# Patient Record
Sex: MALE | Race: WHITE | NOT HISPANIC OR LATINO | Employment: OTHER | URBAN - METROPOLITAN AREA
[De-identification: names, ages, dates, MRNs, and addresses within clinical notes are randomized per-mention and may not be internally consistent; named-entity substitution may affect disease eponyms.]

---

## 2024-04-11 ENCOUNTER — HOSPITAL ENCOUNTER (INPATIENT)
Facility: HOSPITAL | Age: 69
LOS: 5 days | Discharge: HOME | DRG: 871 | End: 2024-04-16
Attending: STUDENT IN AN ORGANIZED HEALTH CARE EDUCATION/TRAINING PROGRAM | Admitting: INTERNAL MEDICINE
Payer: MEDICARE

## 2024-04-11 ENCOUNTER — APPOINTMENT (OUTPATIENT)
Dept: RADIOLOGY | Facility: HOSPITAL | Age: 69
DRG: 871 | End: 2024-04-11
Payer: MEDICARE

## 2024-04-11 ENCOUNTER — APPOINTMENT (OUTPATIENT)
Dept: CARDIOLOGY | Facility: HOSPITAL | Age: 69
DRG: 871 | End: 2024-04-11
Payer: MEDICARE

## 2024-04-11 DIAGNOSIS — J44.1 COPD WITH ACUTE EXACERBATION (MULTI): ICD-10-CM

## 2024-04-11 DIAGNOSIS — J18.9 PNEUMONIA OF BOTH LUNGS DUE TO INFECTIOUS ORGANISM, UNSPECIFIED PART OF LUNG: Primary | ICD-10-CM

## 2024-04-11 DIAGNOSIS — J96.01 ACUTE HYPOXIC RESPIRATORY FAILURE (MULTI): ICD-10-CM

## 2024-04-11 DIAGNOSIS — J18.9 PNEUMONIA OF RIGHT UPPER LOBE DUE TO INFECTIOUS ORGANISM: ICD-10-CM

## 2024-04-11 LAB
ALBUMIN SERPL-MCNC: 3.4 G/DL (ref 3.5–5)
ALP BLD-CCNC: 86 U/L (ref 35–125)
ALT SERPL-CCNC: 22 U/L (ref 5–40)
ANION GAP SERPL CALC-SCNC: >19 MMOL/L
AST SERPL-CCNC: 27 U/L (ref 5–40)
ATRIAL RATE: 106 BPM
BASOPHILS # BLD AUTO: 0.03 X10*3/UL (ref 0–0.1)
BASOPHILS NFR BLD AUTO: 0.1 %
BILIRUB SERPL-MCNC: 0.9 MG/DL (ref 0.1–1.2)
BUN SERPL-MCNC: 24 MG/DL (ref 8–25)
CALCIUM SERPL-MCNC: 9.5 MG/DL (ref 8.5–10.4)
CHLORIDE SERPL-SCNC: 87 MMOL/L (ref 97–107)
CO2 SERPL-SCNC: 23 MMOL/L (ref 24–31)
CREAT SERPL-MCNC: 1.1 MG/DL (ref 0.4–1.6)
EGFRCR SERPLBLD CKD-EPI 2021: 72 ML/MIN/1.73M*2
EOSINOPHIL # BLD AUTO: 0 X10*3/UL (ref 0–0.7)
EOSINOPHIL NFR BLD AUTO: 0 %
ERYTHROCYTE [DISTWIDTH] IN BLOOD BY AUTOMATED COUNT: 13.6 % (ref 11.5–14.5)
FLUAV RNA RESP QL NAA+PROBE: NOT DETECTED
FLUBV RNA RESP QL NAA+PROBE: NOT DETECTED
GLUCOSE SERPL-MCNC: 176 MG/DL (ref 65–99)
HCT VFR BLD AUTO: 38.5 % (ref 41–52)
HGB BLD-MCNC: 12.8 G/DL (ref 13.5–17.5)
IMM GRANULOCYTES # BLD AUTO: 0.19 X10*3/UL (ref 0–0.7)
IMM GRANULOCYTES NFR BLD AUTO: 0.8 % (ref 0–0.9)
LACTATE BLDV-SCNC: 2 MMOL/L (ref 0.4–2)
LYMPHOCYTES # BLD AUTO: 1.04 X10*3/UL (ref 1.2–4.8)
LYMPHOCYTES NFR BLD AUTO: 4.3 %
MCH RBC QN AUTO: 30.6 PG (ref 26–34)
MCHC RBC AUTO-ENTMCNC: 33.2 G/DL (ref 32–36)
MCV RBC AUTO: 92 FL (ref 80–100)
MONOCYTES # BLD AUTO: 1.52 X10*3/UL (ref 0.1–1)
MONOCYTES NFR BLD AUTO: 6.3 %
NEUTROPHILS # BLD AUTO: 21.4 X10*3/UL (ref 1.2–7.7)
NEUTROPHILS NFR BLD AUTO: 88.5 %
NRBC BLD-RTO: 0 /100 WBCS (ref 0–0)
NT-PROBNP SERPL-MCNC: 2822 PG/ML (ref 0–229)
P AXIS: 66 DEGREES
P OFFSET: 204 MS
P ONSET: 152 MS
PLATELET # BLD AUTO: 298 X10*3/UL (ref 150–450)
POTASSIUM SERPL-SCNC: 4.5 MMOL/L (ref 3.4–5.1)
PR INTERVAL: 142 MS
PROT SERPL-MCNC: 6.7 G/DL (ref 5.9–7.9)
Q ONSET: 223 MS
QRS COUNT: 17 BEATS
QRS DURATION: 78 MS
QT INTERVAL: 308 MS
QTC CALCULATION(BAZETT): 409 MS
QTC FREDERICIA: 372 MS
R AXIS: 77 DEGREES
RBC # BLD AUTO: 4.18 X10*6/UL (ref 4.5–5.9)
SARS-COV-2 RNA RESP QL NAA+PROBE: NOT DETECTED
SODIUM SERPL-SCNC: 130 MMOL/L (ref 133–145)
T AXIS: 73 DEGREES
T OFFSET: 377 MS
TROPONIN T SERPL-MCNC: 41 NG/L
TROPONIN T SERPL-MCNC: 44 NG/L
VENTRICULAR RATE: 106 BPM
WBC # BLD AUTO: 24.2 X10*3/UL (ref 4.4–11.3)

## 2024-04-11 PROCEDURE — 80053 COMPREHEN METABOLIC PANEL: CPT | Performed by: STUDENT IN AN ORGANIZED HEALTH CARE EDUCATION/TRAINING PROGRAM

## 2024-04-11 PROCEDURE — 94640 AIRWAY INHALATION TREATMENT: CPT

## 2024-04-11 PROCEDURE — 96365 THER/PROPH/DIAG IV INF INIT: CPT

## 2024-04-11 PROCEDURE — 83880 ASSAY OF NATRIURETIC PEPTIDE: CPT | Performed by: STUDENT IN AN ORGANIZED HEALTH CARE EDUCATION/TRAINING PROGRAM

## 2024-04-11 PROCEDURE — 84484 ASSAY OF TROPONIN QUANT: CPT | Performed by: STUDENT IN AN ORGANIZED HEALTH CARE EDUCATION/TRAINING PROGRAM

## 2024-04-11 PROCEDURE — 36415 COLL VENOUS BLD VENIPUNCTURE: CPT

## 2024-04-11 PROCEDURE — 2500000002 HC RX 250 W HCPCS SELF ADMINISTERED DRUGS (ALT 637 FOR MEDICARE OP, ALT 636 FOR OP/ED): Performed by: INTERNAL MEDICINE

## 2024-04-11 PROCEDURE — 36415 COLL VENOUS BLD VENIPUNCTURE: CPT | Performed by: STUDENT IN AN ORGANIZED HEALTH CARE EDUCATION/TRAINING PROGRAM

## 2024-04-11 PROCEDURE — 96375 TX/PRO/DX INJ NEW DRUG ADDON: CPT

## 2024-04-11 PROCEDURE — 93005 ELECTROCARDIOGRAM TRACING: CPT

## 2024-04-11 PROCEDURE — 2500000004 HC RX 250 GENERAL PHARMACY W/ HCPCS (ALT 636 FOR OP/ED): Performed by: INTERNAL MEDICINE

## 2024-04-11 PROCEDURE — 2500000004 HC RX 250 GENERAL PHARMACY W/ HCPCS (ALT 636 FOR OP/ED)

## 2024-04-11 PROCEDURE — 96361 HYDRATE IV INFUSION ADD-ON: CPT

## 2024-04-11 PROCEDURE — 85025 COMPLETE CBC W/AUTO DIFF WBC: CPT | Performed by: STUDENT IN AN ORGANIZED HEALTH CARE EDUCATION/TRAINING PROGRAM

## 2024-04-11 PROCEDURE — 2500000001 HC RX 250 WO HCPCS SELF ADMINISTERED DRUGS (ALT 637 FOR MEDICARE OP): Performed by: INTERNAL MEDICINE

## 2024-04-11 PROCEDURE — 83605 ASSAY OF LACTIC ACID: CPT

## 2024-04-11 PROCEDURE — 87075 CULTR BACTERIA EXCEPT BLOOD: CPT | Mod: WESLAB

## 2024-04-11 PROCEDURE — 99291 CRITICAL CARE FIRST HOUR: CPT | Mod: 25

## 2024-04-11 PROCEDURE — 2500000002 HC RX 250 W HCPCS SELF ADMINISTERED DRUGS (ALT 637 FOR MEDICARE OP, ALT 636 FOR OP/ED)

## 2024-04-11 PROCEDURE — 87040 BLOOD CULTURE FOR BACTERIA: CPT | Mod: WESLAB

## 2024-04-11 PROCEDURE — 2060000001 HC INTERMEDIATE ICU ROOM DAILY

## 2024-04-11 PROCEDURE — 87636 SARSCOV2 & INF A&B AMP PRB: CPT

## 2024-04-11 PROCEDURE — 71045 X-RAY EXAM CHEST 1 VIEW: CPT

## 2024-04-11 PROCEDURE — 96367 TX/PROPH/DG ADDL SEQ IV INF: CPT

## 2024-04-11 PROCEDURE — 71045 X-RAY EXAM CHEST 1 VIEW: CPT | Performed by: RADIOLOGY

## 2024-04-11 RX ORDER — METFORMIN HYDROCHLORIDE 1000 MG/1
1000 TABLET ORAL
COMMUNITY

## 2024-04-11 RX ORDER — LISINOPRIL 5 MG/1
5 TABLET ORAL DAILY
Status: DISCONTINUED | OUTPATIENT
Start: 2024-04-11 | End: 2024-04-16

## 2024-04-11 RX ORDER — ACETAMINOPHEN 160 MG/5ML
650 SOLUTION ORAL EVERY 4 HOURS PRN
Status: DISCONTINUED | OUTPATIENT
Start: 2024-04-11 | End: 2024-04-16 | Stop reason: HOSPADM

## 2024-04-11 RX ORDER — ASCORBIC ACID 500 MG
500 TABLET ORAL DAILY
COMMUNITY

## 2024-04-11 RX ORDER — ATORVASTATIN CALCIUM 40 MG/1
40 TABLET, FILM COATED ORAL NIGHTLY
Status: DISCONTINUED | OUTPATIENT
Start: 2024-04-11 | End: 2024-04-16 | Stop reason: HOSPADM

## 2024-04-11 RX ORDER — GLIPIZIDE 5 MG/1
5 TABLET ORAL
COMMUNITY

## 2024-04-11 RX ORDER — TADALAFIL 20 MG/1
20 TABLET ORAL DAILY PRN
COMMUNITY

## 2024-04-11 RX ORDER — CEFTRIAXONE 2 G/50ML
2 INJECTION, SOLUTION INTRAVENOUS ONCE
Status: COMPLETED | OUTPATIENT
Start: 2024-04-11 | End: 2024-04-11

## 2024-04-11 RX ORDER — GLIPIZIDE 5 MG/1
5 TABLET ORAL
Status: DISCONTINUED | OUTPATIENT
Start: 2024-04-12 | End: 2024-04-16 | Stop reason: HOSPADM

## 2024-04-11 RX ORDER — FLUTICASONE PROPIONATE 50 MCG
1 SPRAY, SUSPENSION (ML) NASAL DAILY
COMMUNITY

## 2024-04-11 RX ORDER — ATORVASTATIN CALCIUM 40 MG/1
40 TABLET, FILM COATED ORAL DAILY
COMMUNITY

## 2024-04-11 RX ORDER — ACETAMINOPHEN 650 MG/1
650 SUPPOSITORY RECTAL EVERY 4 HOURS PRN
Status: DISCONTINUED | OUTPATIENT
Start: 2024-04-11 | End: 2024-04-16 | Stop reason: HOSPADM

## 2024-04-11 RX ORDER — VITAMIN E MIXED 400 UNIT
400 CAPSULE ORAL DAILY
COMMUNITY

## 2024-04-11 RX ORDER — IPRATROPIUM BROMIDE AND ALBUTEROL SULFATE 2.5; .5 MG/3ML; MG/3ML
3 SOLUTION RESPIRATORY (INHALATION)
Status: DISCONTINUED | OUTPATIENT
Start: 2024-04-12 | End: 2024-04-12

## 2024-04-11 RX ORDER — PREGABALIN 300 MG/1
300 CAPSULE ORAL 2 TIMES DAILY
COMMUNITY

## 2024-04-11 RX ORDER — PANTOPRAZOLE SODIUM 20 MG/1
20 TABLET, DELAYED RELEASE ORAL DAILY
Status: DISCONTINUED | OUTPATIENT
Start: 2024-04-11 | End: 2024-04-16 | Stop reason: HOSPADM

## 2024-04-11 RX ORDER — ENOXAPARIN SODIUM 100 MG/ML
40 INJECTION SUBCUTANEOUS EVERY 24 HOURS
Status: DISCONTINUED | OUTPATIENT
Start: 2024-04-12 | End: 2024-04-16 | Stop reason: HOSPADM

## 2024-04-11 RX ORDER — CEFTRIAXONE 2 G/50ML
2 INJECTION, SOLUTION INTRAVENOUS EVERY 24 HOURS
Status: DISCONTINUED | OUTPATIENT
Start: 2024-04-12 | End: 2024-04-16 | Stop reason: HOSPADM

## 2024-04-11 RX ORDER — PANTOPRAZOLE SODIUM 20 MG/1
20 TABLET, DELAYED RELEASE ORAL
COMMUNITY

## 2024-04-11 RX ORDER — POLYETHYLENE GLYCOL 3350 17 G/17G
17 POWDER, FOR SOLUTION ORAL DAILY
Status: DISCONTINUED | OUTPATIENT
Start: 2024-04-11 | End: 2024-04-16 | Stop reason: HOSPADM

## 2024-04-11 RX ORDER — LISINOPRIL 5 MG/1
5 TABLET ORAL DAILY
COMMUNITY

## 2024-04-11 RX ORDER — PREGABALIN 150 MG/1
300 CAPSULE ORAL 2 TIMES DAILY
Status: DISCONTINUED | OUTPATIENT
Start: 2024-04-11 | End: 2024-04-16 | Stop reason: HOSPADM

## 2024-04-11 RX ORDER — MAGNESIUM L-LACTATE 84 MG
84 TABLET, EXTENDED RELEASE ORAL 2 TIMES DAILY
COMMUNITY

## 2024-04-11 RX ORDER — METFORMIN HYDROCHLORIDE 1000 MG/1
1000 TABLET ORAL
Status: DISCONTINUED | OUTPATIENT
Start: 2024-04-12 | End: 2024-04-16 | Stop reason: HOSPADM

## 2024-04-11 RX ORDER — IPRATROPIUM BROMIDE AND ALBUTEROL SULFATE 2.5; .5 MG/3ML; MG/3ML
3 SOLUTION RESPIRATORY (INHALATION) ONCE
Status: COMPLETED | OUTPATIENT
Start: 2024-04-11 | End: 2024-04-11

## 2024-04-11 RX ORDER — ACETAMINOPHEN 325 MG/1
650 TABLET ORAL EVERY 4 HOURS PRN
Status: DISCONTINUED | OUTPATIENT
Start: 2024-04-11 | End: 2024-04-16 | Stop reason: HOSPADM

## 2024-04-11 RX ADMIN — PREGABALIN 300 MG: 150 CAPSULE ORAL at 23:32

## 2024-04-11 RX ADMIN — DEXTROSE MONOHYDRATE 500 MG: 50 INJECTION, SOLUTION INTRAVENOUS at 13:57

## 2024-04-11 RX ADMIN — METHYLPREDNISOLONE SODIUM SUCCINATE 125 MG: 125 INJECTION, POWDER, FOR SOLUTION INTRAMUSCULAR; INTRAVENOUS at 13:43

## 2024-04-11 RX ADMIN — METHYLPREDNISOLONE SODIUM SUCCINATE 80 MG: 40 INJECTION, POWDER, FOR SOLUTION INTRAMUSCULAR; INTRAVENOUS at 23:32

## 2024-04-11 RX ADMIN — PANTOPRAZOLE SODIUM 20 MG: 20 TABLET, DELAYED RELEASE ORAL at 23:32

## 2024-04-11 RX ADMIN — SODIUM CHLORIDE 1000 ML: 900 INJECTION, SOLUTION INTRAVENOUS at 13:43

## 2024-04-11 RX ADMIN — ATORVASTATIN CALCIUM 40 MG: 40 TABLET, FILM COATED ORAL at 23:38

## 2024-04-11 RX ADMIN — CEFTRIAXONE SODIUM 2 G: 2 INJECTION, SOLUTION INTRAVENOUS at 13:43

## 2024-04-11 RX ADMIN — IPRATROPIUM BROMIDE AND ALBUTEROL SULFATE 3 ML: .5; 3 SOLUTION RESPIRATORY (INHALATION) at 13:43

## 2024-04-11 RX ADMIN — LISINOPRIL 5 MG: 5 TABLET ORAL at 23:32

## 2024-04-11 SDOH — SOCIAL STABILITY: SOCIAL INSECURITY: WERE YOU ABLE TO COMPLETE ALL THE BEHAVIORAL HEALTH SCREENINGS?: NO

## 2024-04-11 SDOH — SOCIAL STABILITY: SOCIAL INSECURITY: HAVE YOU HAD THOUGHTS OF HARMING ANYONE ELSE?: NO

## 2024-04-11 ASSESSMENT — COGNITIVE AND FUNCTIONAL STATUS - GENERAL
MOVING FROM LYING ON BACK TO SITTING ON SIDE OF FLAT BED WITH BEDRAILS: A LITTLE
MOBILITY SCORE: 18
DRESSING REGULAR UPPER BODY CLOTHING: A LITTLE
MOVING TO AND FROM BED TO CHAIR: A LITTLE
EATING MEALS: A LITTLE
STANDING UP FROM CHAIR USING ARMS: A LITTLE
CLIMB 3 TO 5 STEPS WITH RAILING: A LITTLE
DRESSING REGULAR LOWER BODY CLOTHING: A LITTLE
DAILY ACTIVITIY SCORE: 18
PERSONAL GROOMING: A LITTLE
TURNING FROM BACK TO SIDE WHILE IN FLAT BAD: A LITTLE
HELP NEEDED FOR BATHING: A LITTLE
PATIENT BASELINE BEDBOUND: NO
WALKING IN HOSPITAL ROOM: A LITTLE
TOILETING: A LITTLE

## 2024-04-11 ASSESSMENT — ACTIVITIES OF DAILY LIVING (ADL)
TOILETING: INDEPENDENT
BATHING: INDEPENDENT
JUDGMENT_ADEQUATE_SAFELY_COMPLETE_DAILY_ACTIVITIES: YES
FEEDING YOURSELF: INDEPENDENT
GROOMING: INDEPENDENT
ASSISTIVE_DEVICE: EYEGLASSES
PATIENT'S MEMORY ADEQUATE TO SAFELY COMPLETE DAILY ACTIVITIES?: YES
HEARING - LEFT EAR: FUNCTIONAL
WALKS IN HOME: INDEPENDENT
DRESSING YOURSELF: INDEPENDENT
ADEQUATE_TO_COMPLETE_ADL: YES
HEARING - RIGHT EAR: FUNCTIONAL
LACK_OF_TRANSPORTATION: NO

## 2024-04-11 ASSESSMENT — LIFESTYLE VARIABLES
AUDIT-C TOTAL SCORE: 1
HAVE PEOPLE ANNOYED YOU BY CRITICIZING YOUR DRINKING: NO
SKIP TO QUESTIONS 9-10: 1
AUDIT-C TOTAL SCORE: 1
HOW MANY STANDARD DRINKS CONTAINING ALCOHOL DO YOU HAVE ON A TYPICAL DAY: 1 OR 2
EVER HAD A DRINK FIRST THING IN THE MORNING TO STEADY YOUR NERVES TO GET RID OF A HANGOVER: NO
SUBSTANCE_ABUSE_PAST_12_MONTHS: YES
HAVE YOU EVER FELT YOU SHOULD CUT DOWN ON YOUR DRINKING: NO
TOTAL SCORE: 0
HOW OFTEN DO YOU HAVE A DRINK CONTAINING ALCOHOL: MONTHLY OR LESS
PRESCIPTION_ABUSE_PAST_12_MONTHS: NO
HOW OFTEN DO YOU HAVE 6 OR MORE DRINKS ON ONE OCCASION: NEVER
EVER FELT BAD OR GUILTY ABOUT YOUR DRINKING: NO

## 2024-04-11 ASSESSMENT — PAIN SCALES - GENERAL: PAINLEVEL_OUTOF10: 4

## 2024-04-11 ASSESSMENT — COLUMBIA-SUICIDE SEVERITY RATING SCALE - C-SSRS
6. HAVE YOU EVER DONE ANYTHING, STARTED TO DO ANYTHING, OR PREPARED TO DO ANYTHING TO END YOUR LIFE?: NO
1. IN THE PAST MONTH, HAVE YOU WISHED YOU WERE DEAD OR WISHED YOU COULD GO TO SLEEP AND NOT WAKE UP?: NO
2. HAVE YOU ACTUALLY HAD ANY THOUGHTS OF KILLING YOURSELF?: NO

## 2024-04-11 ASSESSMENT — PATIENT HEALTH QUESTIONNAIRE - PHQ9
1. LITTLE INTEREST OR PLEASURE IN DOING THINGS: NOT AT ALL
2. FEELING DOWN, DEPRESSED OR HOPELESS: NOT AT ALL
SUM OF ALL RESPONSES TO PHQ9 QUESTIONS 1 & 2: 0

## 2024-04-11 ASSESSMENT — PAIN DESCRIPTION - PROGRESSION: CLINICAL_PROGRESSION: NOT CHANGED

## 2024-04-11 ASSESSMENT — PAIN - FUNCTIONAL ASSESSMENT: PAIN_FUNCTIONAL_ASSESSMENT: 0-10

## 2024-04-11 NOTE — ED PROVIDER NOTES
Supervisory note:  Patient seen in conjunction with SOHA Mcneil.  Patient presents to the emergency department from primary care physician office.  He went to his PCP office due to pain in the low back.  He reports he has long had pain in his back and denies any new injuries.  He believes that it is sciatica.  While at his primary care physician's office, they noted that his oxygen saturation was low on room air.  On arrival to the emergency department, he is tachycardic and hypotensive.  On examination, there are rhonchorous lung sounds/wheezes heard with expiration.  Heart is tachycardic but regular.  There is no pretibial edema.    EKG revealed mild ST elevations in inferior leads without reciprocal changes.  I discussed patient's case with interventional cardiology, Dr. Slaed, who does not feel that patient requires immediate cardiology intervention at this time.  Laboratory studies notable for leukocytosis, 24,000.  Chest x-ray consistent with pneumonia.  Given patient's abnormal vital signs as well as leukocytosis, he was treated for sepsis with 30 mill per KG fluid bolus and antibiotics tailored for pneumonia.  Blood pressure improved.  Patient then accepted to medicine service for further management.    I personally saw the patient and made/approved the management plan and take responsiblity for the patient management.  Parts of this chart were completed with dictation software, please excuse any errors in transcription.     Javi Romero MD  04/12/24 9986

## 2024-04-11 NOTE — ED PROVIDER NOTES
HPI   Chief Complaint   Patient presents with    Shortness of Breath       HPI  Patient is a 68-year-old male presenting to ER from his primary care office for evaluation of shortness of breath and low oxygen saturation on room air.  Patient is a smoker.  He states that he has been feeling very unwell and more short of breath for the past week and has hardly been able to eat and drink and has a bad cough.  He is denying chest pain at this time.  Per the patient's son, he has been admitted for pneumonia previously in the past and had similar symptoms.  Patient also has back pain that is chronic in nature and in the low back.                  No data recorded                   Patient History   No past medical history on file.  No past surgical history on file.  No family history on file.  Social History     Tobacco Use    Smoking status: Not on file    Smokeless tobacco: Not on file   Substance Use Topics    Alcohol use: Not on file    Drug use: Not on file       Physical Exam   ED Triage Vitals [04/11/24 1311]   Temperature Heart Rate Respirations BP   36.8 °C (98.2 °F) (!) 110 20 96/54      Pulse Ox Temp src Heart Rate Source Patient Position   (!) 93 % -- -- --      BP Location FiO2 (%)     -- --       Physical Exam  Vitals and nursing note reviewed.   Constitutional:       Comments: Patient is awake and alert upon arrival but is tachycardic and slightly hypoxic   HENT:      Head: Normocephalic and atraumatic.   Eyes:      Conjunctiva/sclera: Conjunctivae normal.   Cardiovascular:      Rate and Rhythm: Regular rhythm. Tachycardia present.      Heart sounds: No murmur heard.  Pulmonary:      Comments: Tachypneic but no accessory muscle use, bilateral lungs with rhonchi and wheezes  Abdominal:      Palpations: Abdomen is soft.      Tenderness: There is no abdominal tenderness.   Musculoskeletal:         General: No swelling.      Cervical back: Neck supple.      Right lower leg: No edema.      Left lower leg: No  edema.   Skin:     General: Skin is warm and dry.      Capillary Refill: Capillary refill takes less than 2 seconds.   Neurological:      Mental Status: He is alert and oriented to person, place, and time.   Psychiatric:         Mood and Affect: Mood normal.         ED Course & MDM   ED Course as of 04/11/24 1750   Thu Apr 11, 2024   1337 EKG interpretation: Sinus tachycardia, rate 106.  Normal axis.  Approximately 1 mm of ST elevation noted in lead II, less than 1 mm of ST elevation noted in lead III and aVF.  No reciprocal changes.  ST elevations are concave upward. [ML]   1426 Patient given 30 mL/kg septic fluid bolus for fluid resuscitation [JJ]      ED Course User Index  [JJ] Meg Valderrama PA-C  [ML] Javi Romero MD         Diagnoses as of 04/11/24 1750   Acute hypoxic respiratory failure (CMS/HCC)   Pneumonia of both lungs due to infectious organism, unspecified part of lung       Medical Decision Making  Parts of this chart have been completed using voice recognition software. Please excuse any errors of transcription.  My thought process and reason for plan has been formulated from the time that I saw the patient until the time of disposition and is not specific to one specific moment during their visit and furthermore my MDM encompasses this entire chart and not only this text box.      HPI: Detailed above.    Exam: A medically appropriate exam performed, outlined above, given the known history and presentation.    History obtained from: Patient    EKG: As interpreted by attending physician above in ED course    Social Determinants of Health considered during this visit: Lives independently    Medications given during visit:  Medications   sodium chloride 0.9 % bolus 1,500 mL (1,500 mL intravenous Not Given 4/11/24 1420)   ipratropium-albuteroL (Duo-Neb) 0.5-2.5 mg/3 mL nebulizer solution 3 mL (3 mL nebulization Given 4/11/24 1343)   methylPREDNISolone sod succinate (SOLU-Medrol) injection 125 mg (125 mg  intravenous Given 4/11/24 1343)   sodium chloride 0.9 % bolus 1,000 mL (0 mL intravenous Stopped 4/11/24 1543)   cefTRIAXone (Rocephin) 2 g in dextrose 5% in water (D5W) 50 mL (0 g intravenous Stopped 4/11/24 1413)   azithromycin (Zithromax) in dextrose 5 % in water (D5W) 250 mL  mg (0 mg intravenous Stopped 4/11/24 1457)        Diagnostic/tests  Labs Reviewed   CBC WITH AUTO DIFFERENTIAL - Abnormal       Result Value    WBC 24.2 (*)     nRBC 0.0      RBC 4.18 (*)     Hemoglobin 12.8 (*)     Hematocrit 38.5 (*)     MCV 92      MCH 30.6      MCHC 33.2      RDW 13.6      Platelets 298      Neutrophils % 88.5      Immature Granulocytes %, Automated 0.8      Lymphocytes % 4.3      Monocytes % 6.3      Eosinophils % 0.0      Basophils % 0.1      Neutrophils Absolute 21.40 (*)     Immature Granulocytes Absolute, Automated 0.19      Lymphocytes Absolute 1.04 (*)     Monocytes Absolute 1.52 (*)     Eosinophils Absolute 0.00      Basophils Absolute 0.03     COMPREHENSIVE METABOLIC PANEL - Abnormal    Glucose 176 (*)     Sodium 130 (*)     Potassium 4.5      Chloride 87 (*)     Bicarbonate 23 (*)     Urea Nitrogen 24      Creatinine 1.10      eGFR 72      Calcium 9.5      Albumin 3.4 (*)     Alkaline Phosphatase 86      Total Protein 6.7      AST 27      Bilirubin, Total 0.9      ALT 22      Anion Gap >19 (*)    SERIAL TROPONIN, INITIAL ([facility]) - Abnormal    Troponin T, High Sensitivity 44 (*)    SERIAL TROPONIN,  2 HOUR ([facility]) - Abnormal    Troponin T, High Sensitivity 41 (*)    N-TERMINAL PROBNP - Abnormal    PROBNP 2,822 (*)     Narrative:     Reference ranges are based on clinical submission data. These ranges represent the 95th percentile of normal cut-off points. As NT Pro- BNP values approach 1000 pg/ml, clinical symptoms are more likely associated with CHF.   BLOOD GAS LACTIC ACID, VENOUS - Normal    POCT Lactate, Venous 2.0     SARS-COV-2 AND INFLUENZA A/B PCR - Normal    Flu A Result Not Detected      Flu B  Result Not Detected      Coronavirus 2019, PCR Not Detected      Narrative:     This assay has received FDA Emergency Use Authorization (EUA) and  is only authorized for the duration of time that circumstances exist to justify the authorization of the emergency use of in vitro diagnostic tests for the detection of SARS-CoV-2 virus and/or diagnosis of COVID-19 infection under section 564(b)(1) of the Act, 21 U.S.C. 360bbb-3(b)(1). Testing for SARS-CoV-2 is only recommended for patients who meet current clinical and/or epidemiological criteria as defined by federal, state, or local public health directives. This assay is an in vitro diagnostic nucleic acid amplification test for the qualitative detection of SARS-CoV-2, Influenza A, and Influenza B from nasopharyngeal specimens and has been validated for use at [facility]. Negative results do not preclude COVID-19 infections or Influenza A/B infections, and should not be used as the sole basis for diagnosis, treatment, or other management decisions. If Influenza A/B and RSV PCR results are negative, testing for Parainfluenza virus, Adenovirus and Metapneumovirus is routinely performed for [facility] pediatric oncology and intensive care inpatients, and is available on other patients by placing an add-on request.    BLOOD CULTURE   BLOOD CULTURE   TROPONIN T SERIES, HIGH SENSITIVITY (0, 2 HR, 6 HR)    Narrative:     The following orders were created for panel order Troponin T Series, High Sensitivity (0, 2HR, 6HR).  Procedure                               Abnormality         Status                     ---------                               -----------         ------                     Serial Troponin, Initial...[[id number]]  Abnormal            Final result               Serial Troponin, 2 Hour ...[[id number]]  Abnormal            Final result               Serial Troponin, 6 Hour ...[[id number]]                                                   Please  view results for these tests on the individual orders.   URINALYSIS WITH REFLEX CULTURE AND MICROSCOPIC    Narrative:     The following orders were created for panel order Urinalysis with Reflex Culture and Microscopic.  Procedure                               Abnormality         Status                     ---------                               -----------         ------                     Urinalysis with Reflex C...[[id number]]                                                 Extra Urine Gray Tube[[id number]]                                                         Please view results for these tests on the individual orders.   URINALYSIS WITH REFLEX CULTURE AND MICROSCOPIC   EXTRA URINE GRAY TUBE   SERIAL TROPONIN, 6 HOUR ([facility])      XR chest 1 view   Final Result   1.  Patchy bilateral infiltrates. Consider multifocal pneumonia or   edema. Follow-up is recommended.                  MACRO:   None        Signed by: Jus Martinez 4/11/2024 2:34 PM   Dictation workstation:   [id number]           Considerations/further MDM:  68-year-old male referred to ER by primary care office for low oxygen saturation.  On arrival patient was tachycardic and hypotensive.  Patient was also hypoxic at 85 to 87% on room air thus he was placed on 2 L with improvement.  Lungs were wheezy and rhonchorous bilaterally.  Sepsis order set was initiated and it IV ceftriaxone and azithromycin were administered for suspicion of pneumonia.  Only 1 L fluid bolus was initially administered for concerns of fluid overload.  EKG did reveal mild ST segment elevations in inferior leads for which attending physicians discussed this with cardiology with no recommendations and low concern for ACS at this time.  Patient was having no chest pain.  Basic laboratory workup with leukocytosis and elevated BNP.  Troponins were elevated but flat upon repeat.  Chest x-ray concerning for multifocal pneumonia.  Patient's vital signs did significantly improve with fluid  administration, DuoNeb and Solu-Medrol while in the ER.  Discussed with patient the results of the laboratory and imaging studies as well as need for inpatient management at this time and he was agreeable.  I spoke with Dr. Haider.  He is agreeable to accepting the patient for further management of his multifocal pneumonia and acute hypoxic respiratory failure.  I saw this patient in conjunction with Dr. Romero.  He is refer to his note for additional information regarding patient case.      Procedure  Critical Care    Performed by: Meg Valderrama PA-C  Authorized by: Javi Romero MD    Critical care provider statement:     Critical care time (minutes):  35    Critical care was necessary to treat or prevent imminent or life-threatening deterioration of the following conditions:  Sepsis and respiratory failure    Critical care was time spent personally by me on the following activities:  Examination of patient, discussions with primary provider, development of treatment plan with patient or surrogate, ordering and performing treatments and interventions, ordering and review of laboratory studies, ordering and review of radiographic studies, pulse oximetry and obtaining history from patient or surrogate    Care discussed with: admitting provider         Meg Valderrama PA-C  04/11/24 0444

## 2024-04-11 NOTE — ED TRIAGE NOTES
Patient presents to the emergency department from his doctors office with shortness of breath. Patient states he went to the doctors today because he was having back pain. Patient has chronic back pain. Patient states they found him to be 88% on RA, patient denies oxygen use at home. Patient does have a hx of COPD. Patient states he quit smoking 2 weeks ago.

## 2024-04-12 PROBLEM — J18.9 PNEUMONIA DUE TO INFECTIOUS ORGANISM: Status: ACTIVE | Noted: 2024-04-12

## 2024-04-12 PROBLEM — F17.210 DEPENDENCE ON NICOTINE FROM CIGARETTES: Status: ACTIVE | Noted: 2024-04-12

## 2024-04-12 PROBLEM — J44.1 COPD WITH ACUTE EXACERBATION (MULTI): Status: ACTIVE | Noted: 2024-04-12

## 2024-04-12 LAB
ANION GAP SERPL CALC-SCNC: 13 MMOL/L
APPEARANCE UR: CLEAR
BILIRUB UR STRIP.AUTO-MCNC: NEGATIVE MG/DL
BUN SERPL-MCNC: 33 MG/DL (ref 8–25)
CALCIUM SERPL-MCNC: 9.5 MG/DL (ref 8.5–10.4)
CHLORIDE SERPL-SCNC: 92 MMOL/L (ref 97–107)
CO2 SERPL-SCNC: 25 MMOL/L (ref 24–31)
COLOR UR: COLORLESS
CREAT SERPL-MCNC: 0.8 MG/DL (ref 0.4–1.6)
EGFRCR SERPLBLD CKD-EPI 2021: >90 ML/MIN/1.73M*2
ERYTHROCYTE [DISTWIDTH] IN BLOOD BY AUTOMATED COUNT: 13.1 % (ref 11.5–14.5)
GLUCOSE BLD MANUAL STRIP-MCNC: 344 MG/DL (ref 74–99)
GLUCOSE BLD MANUAL STRIP-MCNC: 399 MG/DL (ref 74–99)
GLUCOSE BLD MANUAL STRIP-MCNC: 411 MG/DL (ref 74–99)
GLUCOSE BLD MANUAL STRIP-MCNC: 414 MG/DL (ref 74–99)
GLUCOSE BLD MANUAL STRIP-MCNC: 420 MG/DL (ref 74–99)
GLUCOSE BLD MANUAL STRIP-MCNC: 526 MG/DL (ref 74–99)
GLUCOSE SERPL-MCNC: 453 MG/DL (ref 65–99)
GLUCOSE UR STRIP.AUTO-MCNC: ABNORMAL MG/DL
HCT VFR BLD AUTO: 39.1 % (ref 41–52)
HGB BLD-MCNC: 13.1 G/DL (ref 13.5–17.5)
HOLD SPECIMEN: NORMAL
KETONES UR STRIP.AUTO-MCNC: ABNORMAL MG/DL
LEUKOCYTE ESTERASE UR QL STRIP.AUTO: NEGATIVE
MCH RBC QN AUTO: 30.4 PG (ref 26–34)
MCHC RBC AUTO-ENTMCNC: 33.5 G/DL (ref 32–36)
MCV RBC AUTO: 91 FL (ref 80–100)
NITRITE UR QL STRIP.AUTO: NEGATIVE
NRBC BLD-RTO: 0 /100 WBCS (ref 0–0)
PH UR STRIP.AUTO: 5 [PH]
PLATELET # BLD AUTO: 290 X10*3/UL (ref 150–450)
POTASSIUM SERPL-SCNC: 4.6 MMOL/L (ref 3.4–5.1)
PROT UR STRIP.AUTO-MCNC: NEGATIVE MG/DL
RBC # BLD AUTO: 4.31 X10*6/UL (ref 4.5–5.9)
RBC # UR STRIP.AUTO: ABNORMAL /UL
RBC #/AREA URNS AUTO: ABNORMAL /HPF
SODIUM SERPL-SCNC: 130 MMOL/L (ref 133–145)
SP GR UR STRIP.AUTO: 1.02
TROPONIN T SERPL-MCNC: 20 NG/L
UROBILINOGEN UR STRIP.AUTO-MCNC: NORMAL MG/DL
WBC # BLD AUTO: 12.6 X10*3/UL (ref 4.4–11.3)
WBC #/AREA URNS AUTO: ABNORMAL /HPF

## 2024-04-12 PROCEDURE — 94664 DEMO&/EVAL PT USE INHALER: CPT

## 2024-04-12 PROCEDURE — 85027 COMPLETE CBC AUTOMATED: CPT | Performed by: INTERNAL MEDICINE

## 2024-04-12 PROCEDURE — 2500000001 HC RX 250 WO HCPCS SELF ADMINISTERED DRUGS (ALT 637 FOR MEDICARE OP): Performed by: INTERNAL MEDICINE

## 2024-04-12 PROCEDURE — 2060000001 HC INTERMEDIATE ICU ROOM DAILY

## 2024-04-12 PROCEDURE — 82947 ASSAY GLUCOSE BLOOD QUANT: CPT

## 2024-04-12 PROCEDURE — 2500000004 HC RX 250 GENERAL PHARMACY W/ HCPCS (ALT 636 FOR OP/ED): Performed by: INTERNAL MEDICINE

## 2024-04-12 PROCEDURE — 36415 COLL VENOUS BLD VENIPUNCTURE: CPT | Performed by: INTERNAL MEDICINE

## 2024-04-12 PROCEDURE — 94640 AIRWAY INHALATION TREATMENT: CPT

## 2024-04-12 PROCEDURE — 81001 URINALYSIS AUTO W/SCOPE: CPT

## 2024-04-12 PROCEDURE — 9420000001 HC RT PATIENT EDUCATION 5 MIN

## 2024-04-12 PROCEDURE — 2500000002 HC RX 250 W HCPCS SELF ADMINISTERED DRUGS (ALT 637 FOR MEDICARE OP, ALT 636 FOR OP/ED): Mod: MUE | Performed by: INTERNAL MEDICINE

## 2024-04-12 PROCEDURE — 82374 ASSAY BLOOD CARBON DIOXIDE: CPT | Performed by: INTERNAL MEDICINE

## 2024-04-12 RX ORDER — IPRATROPIUM BROMIDE AND ALBUTEROL SULFATE 2.5; .5 MG/3ML; MG/3ML
3 SOLUTION RESPIRATORY (INHALATION)
Status: DISCONTINUED | OUTPATIENT
Start: 2024-04-12 | End: 2024-04-14

## 2024-04-12 RX ORDER — ALBUTEROL SULFATE 0.83 MG/ML
2.5 SOLUTION RESPIRATORY (INHALATION) EVERY 2 HOUR PRN
Status: DISCONTINUED | OUTPATIENT
Start: 2024-04-12 | End: 2024-04-16 | Stop reason: HOSPADM

## 2024-04-12 RX ORDER — INSULIN GLARGINE 100 [IU]/ML
20 INJECTION, SOLUTION SUBCUTANEOUS EVERY 24 HOURS
Status: DISCONTINUED | OUTPATIENT
Start: 2024-04-12 | End: 2024-04-14

## 2024-04-12 RX ORDER — DEXTROSE 50 % IN WATER (D50W) INTRAVENOUS SYRINGE
25
Status: DISCONTINUED | OUTPATIENT
Start: 2024-04-12 | End: 2024-04-16 | Stop reason: HOSPADM

## 2024-04-12 RX ORDER — INSULIN LISPRO 100 [IU]/ML
0-5 INJECTION, SOLUTION INTRAVENOUS; SUBCUTANEOUS
Status: DISCONTINUED | OUTPATIENT
Start: 2024-04-12 | End: 2024-04-16 | Stop reason: HOSPADM

## 2024-04-12 RX ORDER — INSULIN LISPRO 100 [IU]/ML
12 INJECTION, SOLUTION INTRAVENOUS; SUBCUTANEOUS ONCE
Status: COMPLETED | OUTPATIENT
Start: 2024-04-12 | End: 2024-04-12

## 2024-04-12 RX ORDER — DEXTROSE 50 % IN WATER (D50W) INTRAVENOUS SYRINGE
12.5
Status: DISCONTINUED | OUTPATIENT
Start: 2024-04-12 | End: 2024-04-16 | Stop reason: HOSPADM

## 2024-04-12 RX ADMIN — GLIPIZIDE 5 MG: 5 TABLET ORAL at 16:35

## 2024-04-12 RX ADMIN — ATORVASTATIN CALCIUM 40 MG: 40 TABLET, FILM COATED ORAL at 21:13

## 2024-04-12 RX ADMIN — DEXTROSE MONOHYDRATE 500 MG: 50 INJECTION, SOLUTION INTRAVENOUS at 14:22

## 2024-04-12 RX ADMIN — IPRATROPIUM BROMIDE AND ALBUTEROL SULFATE 3 ML: 2.5; .5 SOLUTION RESPIRATORY (INHALATION) at 07:14

## 2024-04-12 RX ADMIN — INSULIN GLARGINE 20 UNITS: 100 INJECTION, SOLUTION SUBCUTANEOUS at 21:14

## 2024-04-12 RX ADMIN — CEFTRIAXONE SODIUM 2 G: 2 INJECTION, SOLUTION INTRAVENOUS at 13:36

## 2024-04-12 RX ADMIN — IPRATROPIUM BROMIDE AND ALBUTEROL SULFATE 3 ML: 2.5; .5 SOLUTION RESPIRATORY (INHALATION) at 11:35

## 2024-04-12 RX ADMIN — GLIPIZIDE 5 MG: 5 TABLET ORAL at 07:00

## 2024-04-12 RX ADMIN — IPRATROPIUM BROMIDE AND ALBUTEROL SULFATE 3 ML: 2.5; .5 SOLUTION RESPIRATORY (INHALATION) at 00:15

## 2024-04-12 RX ADMIN — METHYLPREDNISOLONE SODIUM SUCCINATE 80 MG: 40 INJECTION, POWDER, FOR SOLUTION INTRAMUSCULAR; INTRAVENOUS at 05:30

## 2024-04-12 RX ADMIN — PREGABALIN 300 MG: 150 CAPSULE ORAL at 09:06

## 2024-04-12 RX ADMIN — ENOXAPARIN SODIUM 40 MG: 40 INJECTION SUBCUTANEOUS at 09:05

## 2024-04-12 RX ADMIN — IPRATROPIUM BROMIDE AND ALBUTEROL SULFATE 3 ML: 2.5; .5 SOLUTION RESPIRATORY (INHALATION) at 20:34

## 2024-04-12 RX ADMIN — METFORMIN HYDROCHLORIDE 1000 MG: 1000 TABLET ORAL at 16:35

## 2024-04-12 RX ADMIN — PREGABALIN 300 MG: 150 CAPSULE ORAL at 21:13

## 2024-04-12 RX ADMIN — INSULIN LISPRO 12 UNITS: 100 INJECTION, SOLUTION INTRAVENOUS; SUBCUTANEOUS at 12:07

## 2024-04-12 RX ADMIN — METHYLPREDNISOLONE SODIUM SUCCINATE 80 MG: 40 INJECTION, POWDER, FOR SOLUTION INTRAMUSCULAR; INTRAVENOUS at 09:06

## 2024-04-12 RX ADMIN — INSULIN LISPRO 5 UNITS: 100 INJECTION, SOLUTION INTRAVENOUS; SUBCUTANEOUS at 17:57

## 2024-04-12 RX ADMIN — INSULIN LISPRO 5 UNITS: 100 INJECTION, SOLUTION INTRAVENOUS; SUBCUTANEOUS at 12:09

## 2024-04-12 RX ADMIN — PANTOPRAZOLE SODIUM 20 MG: 20 TABLET, DELAYED RELEASE ORAL at 09:06

## 2024-04-12 RX ADMIN — METHYLPREDNISOLONE SODIUM SUCCINATE 80 MG: 40 INJECTION, POWDER, FOR SOLUTION INTRAMUSCULAR; INTRAVENOUS at 14:08

## 2024-04-12 RX ADMIN — LISINOPRIL 5 MG: 5 TABLET ORAL at 09:00

## 2024-04-12 RX ADMIN — METFORMIN HYDROCHLORIDE 1000 MG: 1000 TABLET ORAL at 09:06

## 2024-04-12 SDOH — SOCIAL STABILITY: SOCIAL NETWORK
IN A TYPICAL WEEK, HOW MANY TIMES DO YOU TALK ON THE PHONE WITH FAMILY, FRIENDS, OR NEIGHBORS?: MORE THAN THREE TIMES A WEEK

## 2024-04-12 SDOH — ECONOMIC STABILITY: TRANSPORTATION INSECURITY
IN THE PAST 12 MONTHS, HAS THE LACK OF TRANSPORTATION KEPT YOU FROM MEDICAL APPOINTMENTS OR FROM GETTING MEDICATIONS?: NO

## 2024-04-12 SDOH — ECONOMIC STABILITY: FOOD INSECURITY: WITHIN THE PAST 12 MONTHS, YOU WORRIED THAT YOUR FOOD WOULD RUN OUT BEFORE YOU GOT MONEY TO BUY MORE.: NEVER TRUE

## 2024-04-12 SDOH — SOCIAL STABILITY: SOCIAL INSECURITY: WITHIN THE LAST YEAR, HAVE YOU BEEN AFRAID OF YOUR PARTNER OR EX-PARTNER?: NO

## 2024-04-12 SDOH — SOCIAL STABILITY: SOCIAL NETWORK
DO YOU BELONG TO ANY CLUBS OR ORGANIZATIONS SUCH AS CHURCH GROUPS UNIONS, FRATERNAL OR ATHLETIC GROUPS, OR SCHOOL GROUPS?: NO

## 2024-04-12 SDOH — ECONOMIC STABILITY: FOOD INSECURITY: WITHIN THE PAST 12 MONTHS, THE FOOD YOU BOUGHT JUST DIDN'T LAST AND YOU DIDN'T HAVE MONEY TO GET MORE.: NEVER TRUE

## 2024-04-12 SDOH — SOCIAL STABILITY: SOCIAL INSECURITY
WITHIN THE LAST YEAR, HAVE TO BEEN RAPED OR FORCED TO HAVE ANY KIND OF SEXUAL ACTIVITY BY YOUR PARTNER OR EX-PARTNER?: NO

## 2024-04-12 SDOH — ECONOMIC STABILITY: INCOME INSECURITY: HOW HARD IS IT FOR YOU TO PAY FOR THE VERY BASICS LIKE FOOD, HOUSING, MEDICAL CARE, AND HEATING?: NOT VERY HARD

## 2024-04-12 SDOH — HEALTH STABILITY: MENTAL HEALTH: HOW OFTEN DO YOU HAVE A DRINK CONTAINING ALCOHOL?: MONTHLY OR LESS

## 2024-04-12 SDOH — SOCIAL STABILITY: SOCIAL INSECURITY: WITHIN THE LAST YEAR, HAVE YOU BEEN HUMILIATED OR EMOTIONALLY ABUSED IN OTHER WAYS BY YOUR PARTNER OR EX-PARTNER?: NO

## 2024-04-12 SDOH — ECONOMIC STABILITY: INCOME INSECURITY: IN THE PAST 12 MONTHS, HAS THE ELECTRIC, GAS, OIL, OR WATER COMPANY THREATENED TO SHUT OFF SERVICE IN YOUR HOME?: NO

## 2024-04-12 SDOH — ECONOMIC STABILITY: INCOME INSECURITY: IN THE LAST 12 MONTHS, WAS THERE A TIME WHEN YOU WERE NOT ABLE TO PAY THE MORTGAGE OR RENT ON TIME?: NO

## 2024-04-12 SDOH — SOCIAL STABILITY: SOCIAL NETWORK: HOW OFTEN DO YOU GET TOGETHER WITH FRIENDS OR RELATIVES?: MORE THAN THREE TIMES A WEEK

## 2024-04-12 SDOH — ECONOMIC STABILITY: TRANSPORTATION INSECURITY
IN THE PAST 12 MONTHS, HAS LACK OF TRANSPORTATION KEPT YOU FROM MEETINGS, WORK, OR FROM GETTING THINGS NEEDED FOR DAILY LIVING?: NO

## 2024-04-12 SDOH — HEALTH STABILITY: MENTAL HEALTH: HOW OFTEN DO YOU HAVE 6 OR MORE DRINKS ON ONE OCCASION?: NEVER

## 2024-04-12 SDOH — HEALTH STABILITY: MENTAL HEALTH
HOW OFTEN DO YOU NEED TO HAVE SOMEONE HELP YOU WHEN YOU READ INSTRUCTIONS, PAMPHLETS, OR OTHER WRITTEN MATERIAL FROM YOUR DOCTOR OR PHARMACY?: NEVER

## 2024-04-12 SDOH — SOCIAL STABILITY: SOCIAL NETWORK: HOW OFTEN DO YOU ATTEND CHURCH OR RELIGIOUS SERVICES?: NEVER

## 2024-04-12 SDOH — HEALTH STABILITY: MENTAL HEALTH
STRESS IS WHEN SOMEONE FEELS TENSE, NERVOUS, ANXIOUS, OR CAN'T SLEEP AT NIGHT BECAUSE THEIR MIND IS TROUBLED. HOW STRESSED ARE YOU?: ONLY A LITTLE

## 2024-04-12 SDOH — SOCIAL STABILITY: SOCIAL INSECURITY
WITHIN THE LAST YEAR, HAVE YOU BEEN KICKED, HIT, SLAPPED, OR OTHERWISE PHYSICALLY HURT BY YOUR PARTNER OR EX-PARTNER?: NO

## 2024-04-12 SDOH — SOCIAL STABILITY: SOCIAL NETWORK: HOW OFTEN DO YOU ATTENT MEETINGS OF THE CLUB OR ORGANIZATION YOU BELONG TO?: NEVER

## 2024-04-12 SDOH — HEALTH STABILITY: MENTAL HEALTH: HOW MANY STANDARD DRINKS CONTAINING ALCOHOL DO YOU HAVE ON A TYPICAL DAY?: 1 OR 2

## 2024-04-12 SDOH — ECONOMIC STABILITY: HOUSING INSECURITY
IN THE LAST 12 MONTHS, WAS THERE A TIME WHEN YOU DID NOT HAVE A STEADY PLACE TO SLEEP OR SLEPT IN A SHELTER (INCLUDING NOW)?: NO

## 2024-04-12 ASSESSMENT — COGNITIVE AND FUNCTIONAL STATUS - GENERAL
TURNING FROM BACK TO SIDE WHILE IN FLAT BAD: A LITTLE
CLIMB 3 TO 5 STEPS WITH RAILING: A LITTLE
STANDING UP FROM CHAIR USING ARMS: A LITTLE
MOVING FROM LYING ON BACK TO SITTING ON SIDE OF FLAT BED WITH BEDRAILS: A LITTLE
DRESSING REGULAR UPPER BODY CLOTHING: A LITTLE
MOVING TO AND FROM BED TO CHAIR: A LITTLE
STANDING UP FROM CHAIR USING ARMS: A LITTLE
DAILY ACTIVITIY SCORE: 18
WALKING IN HOSPITAL ROOM: A LITTLE
HELP NEEDED FOR BATHING: A LITTLE
TOILETING: A LITTLE
MOBILITY SCORE: 18
MOVING FROM LYING ON BACK TO SITTING ON SIDE OF FLAT BED WITH BEDRAILS: A LITTLE
EATING MEALS: A LITTLE
TURNING FROM BACK TO SIDE WHILE IN FLAT BAD: A LITTLE
DRESSING REGULAR LOWER BODY CLOTHING: A LITTLE
MOVING TO AND FROM BED TO CHAIR: A LITTLE
CLIMB 3 TO 5 STEPS WITH RAILING: A LITTLE
MOBILITY SCORE: 18
WALKING IN HOSPITAL ROOM: A LITTLE
PERSONAL GROOMING: A LITTLE

## 2024-04-12 ASSESSMENT — PAIN SCALES - GENERAL
PAINLEVEL_OUTOF10: 0 - NO PAIN

## 2024-04-12 ASSESSMENT — ENCOUNTER SYMPTOMS
COUGH: 1
WHEEZING: 1
GASTROINTESTINAL NEGATIVE: 1
CONSTITUTIONAL NEGATIVE: 1
SHORTNESS OF BREATH: 1
MUSCULOSKELETAL NEGATIVE: 1
CARDIOVASCULAR NEGATIVE: 1
NEUROLOGICAL NEGATIVE: 1
EYES NEGATIVE: 1

## 2024-04-12 ASSESSMENT — PAIN - FUNCTIONAL ASSESSMENT: PAIN_FUNCTIONAL_ASSESSMENT: 0-10

## 2024-04-12 ASSESSMENT — LIFESTYLE VARIABLES
SKIP TO QUESTIONS 9-10: 1
AUDIT-C TOTAL SCORE: 1

## 2024-04-12 NOTE — H&P
INTERNAL MEDICINE     HISTORY AND PHYSICAL      Chief Complaint:  Shortness of breath    History Of Present Illness  Aaron Shaw is a 68 y.o. male presenting with shortness of breath.  Patient was seen in his primary care physician's office and sent to the emergency room.  He reported 2-week history of cough productive of tan sputum.  He also reports ongoing nicotine use.  He has been wheezing.  He reports compliance with his inhaler therapy.  He also reports chronic low back pain which has been worse recently.  He has a history of sciatica.  He was evaluated in the ER and found to have evidence of fluid overload as well as COPD exacerbation.  He received steroids, aerosol treatments, diuretics, and antibiotics with improvement.  He was admitted to stepdown unit.     Past Medical History  He has a past medical history of COPD (chronic obstructive pulmonary disease) (Multi).    Surgical History  He has no past surgical history on file.     Social History  He reports that he quit smoking about 2 weeks ago. His smoking use included cigarettes. He has been exposed to tobacco smoke. He does not have any smokeless tobacco history on file. He reports that he does not currently use alcohol. He reports that he does not currently use drugs.    Family History  No family history on file.     Allergies  Patient has no known allergies.    Home Medications:  Prior to Admission medications    Medication Sig Start Date End Date Taking? Authorizing Provider   atorvastatin (Lipitor) 40 mg tablet Take 1 tablet (40 mg) by mouth once daily.   Yes Historical Provider, MD   glipiZIDE (Glucotrol) 5 mg tablet Take 1 tablet (5 mg) by mouth 2 times a day before meals.   Yes Historical Provider, MD   lisinopril 5 mg tablet Take 1 tablet (5 mg) by mouth once daily.   Yes Historical Provider, MD   pregabalin (Lyrica) 300 mg capsule Take 1 capsule (300 mg) by mouth 2 times a day.   Yes Historical Provider, MD   pregabalin (Lyrica) 300 mg  capsule Take 1 capsule (300 mg) by mouth 2 times a day.   Yes Historical Provider, MD   ascorbic acid (Vitamin C) 500 mg tablet Take 1 tablet (500 mg) by mouth once daily.    Historical Provider, MD   fluticasone (Flonase) 50 mcg/actuation nasal spray Administer 1 spray into each nostril once daily. Shake gently. Before first use, prime pump. After use, clean tip and replace cap.    Historical Provider, MD   magnesium lactate CR (Magtab) 84 mg ER tablet Take 1 tablet (84 mg) by mouth 2 times a day. With meals    Historical Provider, MD   metFORMIN (Glucophage) 1,000 mg tablet Take 1 tablet (1,000 mg) by mouth 2 times a day with meals.    Historical Provider, MD   pantoprazole (ProtoNix) 20 mg EC tablet Take 1 tablet (20 mg) by mouth once daily in the morning. Take before meals. Do not crush, chew, or split.    Historical Provider, MD   tadalafil 20 mg tablet Take 1 tablet (20 mg) by mouth once daily as needed for erectile dysfunction.    Historical Provider, MD   vitamin E 180 mg (400 unit) capsule Take 1 capsule (400 Units) by mouth once daily.    Historical Provider, MD        Review of Systems   Constitutional: Negative.    HENT: Negative.     Eyes: Negative.    Respiratory:  Positive for cough, shortness of breath and wheezing.    Cardiovascular: Negative.    Gastrointestinal: Negative.    Musculoskeletal: Negative.    Skin: Negative.    Neurological: Negative.         Last Recorded Vitals  Blood pressure 120/56, pulse 87, temperature 35.9 °C (96.6 °F), temperature source Temporal, resp. rate 22, height 1.829 m (6'), weight 81 kg (178 lb 9.2 oz), SpO2 95%.    Physical Exam      Constitutional:       Appearance: Middle-aged, well-built, in no distress  HENT:      Head: Normocephalic and atraumatic.   Eyes:      Extraocular Movements: Extraocular movements intact.      Pupils: Pupils are equal, round, and reactive to light.   Cardiovascular:      Rate and Rhythm: Normal rate and regular rhythm.      Pulses: Normal  pulses.      Heart sounds: Normal heart sounds.   Pulmonary:      Effort: Pulmonary effort is normal.      Breath sounds: Diminished bases, faint wheeze, no rhonchi.  Abdominal:      General: Abdomen is flat. Bowel sounds are normal.      Palpations: Abdomen is soft.   Musculoskeletal:         General: Normal range of motion.      Cervical back: Normal range of motion and neck supple.   Skin:     General: Skin is warm and dry.   Neurological:      General: No focal deficit present.      Mental Status: He is alert and oriented to person, place, and time. Mental status is at baseline.       Relevant Results    Results for orders placed or performed during the hospital encounter of 04/11/24 (from the past 24 hour(s))   Serial Troponin, 2 Hour ([facility])   Result Value Ref Range    Troponin T, High Sensitivity 41 () <=14 ng/L   Serial Troponin, 6 Hour ([facility])   Result Value Ref Range    Troponin T, High Sensitivity 20 (HH) <=14 ng/L   POCT GLUCOSE   Result Value Ref Range    POCT Glucose 420 (H) 74 - 99 mg/dL   CBC   Result Value Ref Range    WBC 12.6 (H) 4.4 - 11.3 x10*3/uL    nRBC 0.0 0.0 - 0.0 /100 WBCs    RBC 4.31 (L) 4.50 - 5.90 x10*6/uL    Hemoglobin 13.1 (L) 13.5 - 17.5 g/dL    Hematocrit 39.1 (L) 41.0 - 52.0 %    MCV 91 80 - 100 fL    MCH 30.4 26.0 - 34.0 pg    MCHC 33.5 32.0 - 36.0 g/dL    RDW 13.1 11.5 - 14.5 %    Platelets 290 150 - 450 x10*3/uL   Basic metabolic panel   Result Value Ref Range    Glucose 453 (H) 65 - 99 mg/dL    Sodium 130 (L) 133 - 145 mmol/L    Potassium 4.6 3.4 - 5.1 mmol/L    Chloride 92 (L) 97 - 107 mmol/L    Bicarbonate 25 24 - 31 mmol/L    Urea Nitrogen 33 (H) 8 - 25 mg/dL    Creatinine 0.80 0.40 - 1.60 mg/dL    eGFR >90 >60 mL/min/1.73m*2    Calcium 9.5 8.5 - 10.4 mg/dL    Anion Gap 13 <=19 mmol/L   Urinalysis with Reflex Culture and Microscopic   Result Value Ref Range    Color, Urine Colorless (N) Light-Yellow, Yellow, Dark-Yellow    Appearance, Urine Clear Clear    Specific Gravity,  Urine 1.025 1.005 - 1.035    pH, Urine 5.0 5.0, 5.5, 6.0, 6.5, 7.0, 7.5, 8.0    Protein, Urine NEGATIVE NEGATIVE, 10 (TRACE), 20 (TRACE) mg/dL    Glucose, Urine OVER (4+) (A) Normal mg/dL    Blood, Urine 0.5 (2+) (A) NEGATIVE    Ketones, Urine 10 (1+) (A) NEGATIVE mg/dL    Bilirubin, Urine NEGATIVE NEGATIVE    Urobilinogen, Urine Normal Normal mg/dL    Nitrite, Urine NEGATIVE NEGATIVE    Leukocyte Esterase, Urine NEGATIVE NEGATIVE   Urinalysis Microscopic   Result Value Ref Range    WBC, Urine NONE 1-5, NONE /HPF    RBC, Urine 6-10 (A) NONE, 1-2, 3-5 /HPF   POCT GLUCOSE   Result Value Ref Range    POCT Glucose 344 (H) 74 - 99 mg/dL   POCT GLUCOSE   Result Value Ref Range    POCT Glucose 526 (H) 74 - 99 mg/dL           Principal Problem:    Acute hypoxic respiratory failure (Multi)      Problem list:  Principal Problem:    Acute hypoxic respiratory failure (Multi)        ASSESSMENT AND PLAN:    COPD exacerbation -aerosol treatments, steroids, antibiotics, improving.  Secondary to community-acquired pneumonia, serial x-rays.  Diabetes type 2 -sliding scale coverage, continue home medications.  May need Lantus while on steroids.  Nicotine abuse -cessation strongly encouraged.  Back pain -steroids, pregabalin, review x-rays.      Darryl Haider MD  04/12/241:43 PM

## 2024-04-12 NOTE — PROGRESS NOTES
[facility] met with the pt at bedside. Pt states he lives alone in an apartment, is independent for mobility at home but uses a scooter when needing to go longer distances. Pt has a shower bench at home, does not wear home 02. Pt drives. Pts supports include a son and his neighbors. Pt denies MH Hx. Pt states he does not really drink, quit smoking a few weeks ago. [facility]. Discussion around dc planning with pt denying he will have any needs.      04/12/24 1535   Discharge Planning   Living Arrangements Alone   Support Systems Family members   Type of Residence Private residence   Number of Stairs to Enter Residence 0   Number of Stairs Within Residence 0   Do you have animals or pets at home? No   Who is requesting discharge planning? Provider   Home or Post Acute Services None   Patient expects to be discharged to: Home

## 2024-04-12 NOTE — CONSULTS
Pulmonary Consult Note    Chief Complaint   Patient presents with    Shortness of Breath        Reason for consultation:  Pneumonia, COPD exacerbation, acute respiratory failure    History Of Present Illness  Aaron Shaw is a 68 y.o. male presenting with 2-week history of productive cough of tan sputum, wheezing, shortness of breath.  He has been using his inhaler therapy as he carries a diagnosis of COPD.  Given persistence of symptoms, he was sent to the emergency room by his primary care physician where imaging suggested pneumonia.  He was advised admission were asked to assist in his management.     Past Medical History  He has a past medical history of COPD (chronic obstructive pulmonary disease) (Multi).    Surgical History  He has no past surgical history on file.     Social History  He reports that he quit smoking about 2 weeks ago. His smoking use included cigarettes. He has been exposed to tobacco smoke. He does not have any smokeless tobacco history on file. He reports that he does not currently use alcohol. He reports that he does not currently use drugs.  He is an [employer]     Family History  No family history on file.     Allergies  Patient has no known allergies.    Review of Systems   All other systems reviewed and are negative.      Last Recorded Vitals  Blood pressure 123/63, pulse 87, temperature 35.7 °C (96.3 °F), temperature source Temporal, resp. rate 22, height 1.829 m (6'), weight 81 kg (178 lb 9.2 oz), SpO2 97%.     Physical Exam  Vitals reviewed.   Constitutional:       Interventions: Nasal cannula in place.   HENT:      Head: Normocephalic and atraumatic.      Nose: Nose normal.      Mouth/Throat:      Mouth: Mucous membranes are moist.      Pharynx: Oropharynx is clear.   Eyes:      General: No scleral icterus.     Conjunctiva/sclera: Conjunctivae normal.   Cardiovascular:      Rate and Rhythm: Normal rate and regular rhythm.   Pulmonary:      Effort: Pulmonary effort is  normal.      Breath sounds: Wheezing and rhonchi present.   Abdominal:      General: Bowel sounds are normal.      Palpations: Abdomen is soft.   Musculoskeletal:      Right lower leg: No edema.      Left lower leg: No edema.   Skin:     General: Skin is warm and dry.   Neurological:      General: No focal deficit present.   Psychiatric:         Mood and Affect: Mood normal.         Behavior: Behavior normal.         Meds  Scheduled medications  atorvastatin, 40 mg, oral, Nightly  azithromycin, 500 mg, intravenous, q24h  cefTRIAXone, 2 g, intravenous, q24h  enoxaparin, 40 mg, subcutaneous, q24h  glipiZIDE, 5 mg, oral, BID AC  insulin glargine, 20 Units, subcutaneous, q24h  insulin lispro, 0-5 Units, subcutaneous, TID with meals  ipratropium-albuteroL, 3 mL, nebulization, 4x daily  lisinopril, 5 mg, oral, Daily  metFORMIN, 1,000 mg, oral, BID with meals  methylPREDNISolone sodium succinate (PF), 30 mg, intravenous, q8h  pantoprazole, 20 mg, oral, Daily  polyethylene glycol, 17 g, oral, Daily  pregabalin, 300 mg, oral, BID  sodium chloride, 1,500 mL, intravenous, Once  tiotropium, 2 puff, inhalation, Daily      Continuous medications     PRN medications  PRN medications: acetaminophen **OR** acetaminophen **OR** acetaminophen, albuterol, dextrose, dextrose, glucagon, glucagon       Relevant Results  CHEM comprehensive panel reviewed and notable for significant hyperglycemia.  Likely in the setting of steroid administration and infection  CBC with leukocytosis of 12,000  Chest x-ray personally reviewed and shows patchy bilateral infiltrates consistent with or suggestive of multifocal pneumonia    Principal Problem:    Acute hypoxic respiratory failure (Multi)  Active Problems:    COPD with acute exacerbation (Multi)    Pneumonia due to infectious organism    Dependence on nicotine from cigarettes       Recommendations  Acute respiratory failure with hypoxia: In the setting of pneumonia and airway disease  Continue with  supplemental oxygen hopefully we can wean as tolerated  Incentive spirometry  COPD with acute exacerbation  Agree with steroids however old will decrease dosing especially in light of significant hyperglycemia  Aerosolized bronchodilators  Aerosolized corticosteroids  FEV1 once optimized and/or prior to discharge  Probable bacterial pneumonia  Agree with ceftriaxone and azithromycin, cover for gram-positive and atypical  Sputum culture if able  Diabetes mellitus with hyperglycemia  Glycemic control per primary.  Hopefully steroid reducing will help  Nicotine dependence  Smoking cessation was discussed and strongly advised  Prophylaxis  Enoxaparin noted      Thank you for this consultation.  Will continue to follow with you.  Given airway disease and wheezing on exam, anticipate 24 to 48 hours minimum.        Anjum Sena MD  Pulmonary/Critical Care Physician

## 2024-04-12 NOTE — CARE PLAN
Problem: Respiratory  Goal: No signs of respiratory distress (eg. Use of accessory muscles. Peds grunting)  Outcome: Progressing  Goal: Verbalize decreased shortness of breath this shift  Outcome: Progressing  Goal: Wean oxygen to maintain O2 saturation per order/standard this shift  Outcome: Progressing

## 2024-04-12 NOTE — NURSING NOTE
Assume care of patient. Patient is A&Ox4 laying in bed. No complaint of SOB or pain. 2LNC. Vitals are stable. Patient able to make needs known.

## 2024-04-13 LAB
ANION GAP SERPL CALC-SCNC: 11 MMOL/L
BASOPHILS # BLD AUTO: 0.02 X10*3/UL (ref 0–0.1)
BASOPHILS NFR BLD AUTO: 0.2 %
BUN SERPL-MCNC: 29 MG/DL (ref 8–25)
CALCIUM SERPL-MCNC: 9.7 MG/DL (ref 8.5–10.4)
CHLORIDE SERPL-SCNC: 96 MMOL/L (ref 97–107)
CO2 SERPL-SCNC: 28 MMOL/L (ref 24–31)
CREAT SERPL-MCNC: 0.7 MG/DL (ref 0.4–1.6)
EGFRCR SERPLBLD CKD-EPI 2021: >90 ML/MIN/1.73M*2
EOSINOPHIL # BLD AUTO: 0.01 X10*3/UL (ref 0–0.7)
EOSINOPHIL NFR BLD AUTO: 0.1 %
ERYTHROCYTE [DISTWIDTH] IN BLOOD BY AUTOMATED COUNT: 13.3 % (ref 11.5–14.5)
GLUCOSE BLD MANUAL STRIP-MCNC: 282 MG/DL (ref 74–99)
GLUCOSE BLD MANUAL STRIP-MCNC: 342 MG/DL (ref 74–99)
GLUCOSE BLD MANUAL STRIP-MCNC: 346 MG/DL (ref 74–99)
GLUCOSE BLD MANUAL STRIP-MCNC: 408 MG/DL (ref 74–99)
GLUCOSE SERPL-MCNC: 379 MG/DL (ref 65–99)
HCT VFR BLD AUTO: 36.4 % (ref 41–52)
HGB BLD-MCNC: 12 G/DL (ref 13.5–17.5)
IMM GRANULOCYTES # BLD AUTO: 0.07 X10*3/UL (ref 0–0.7)
IMM GRANULOCYTES NFR BLD AUTO: 0.6 % (ref 0–0.9)
LYMPHOCYTES # BLD AUTO: 0.74 X10*3/UL (ref 1.2–4.8)
LYMPHOCYTES NFR BLD AUTO: 6.4 %
MCH RBC QN AUTO: 30.5 PG (ref 26–34)
MCHC RBC AUTO-ENTMCNC: 33 G/DL (ref 32–36)
MCV RBC AUTO: 93 FL (ref 80–100)
MONOCYTES # BLD AUTO: 0.53 X10*3/UL (ref 0.1–1)
MONOCYTES NFR BLD AUTO: 4.6 %
NEUTROPHILS # BLD AUTO: 10.14 X10*3/UL (ref 1.2–7.7)
NEUTROPHILS NFR BLD AUTO: 88.1 %
NRBC BLD-RTO: 0 /100 WBCS (ref 0–0)
PLATELET # BLD AUTO: 334 X10*3/UL (ref 150–450)
POTASSIUM SERPL-SCNC: 4.6 MMOL/L (ref 3.4–5.1)
RBC # BLD AUTO: 3.93 X10*6/UL (ref 4.5–5.9)
SODIUM SERPL-SCNC: 135 MMOL/L (ref 133–145)
WBC # BLD AUTO: 11.5 X10*3/UL (ref 4.4–11.3)

## 2024-04-13 PROCEDURE — 2500000004 HC RX 250 GENERAL PHARMACY W/ HCPCS (ALT 636 FOR OP/ED): Performed by: INTERNAL MEDICINE

## 2024-04-13 PROCEDURE — 94640 AIRWAY INHALATION TREATMENT: CPT

## 2024-04-13 PROCEDURE — 9420000001 HC RT PATIENT EDUCATION 5 MIN

## 2024-04-13 PROCEDURE — 2060000001 HC INTERMEDIATE ICU ROOM DAILY

## 2024-04-13 PROCEDURE — 85025 COMPLETE CBC W/AUTO DIFF WBC: CPT | Performed by: INTERNAL MEDICINE

## 2024-04-13 PROCEDURE — 80048 BASIC METABOLIC PNL TOTAL CA: CPT | Performed by: INTERNAL MEDICINE

## 2024-04-13 PROCEDURE — 2500000004 HC RX 250 GENERAL PHARMACY W/ HCPCS (ALT 636 FOR OP/ED): Performed by: HOSPITALIST

## 2024-04-13 PROCEDURE — 2500000001 HC RX 250 WO HCPCS SELF ADMINISTERED DRUGS (ALT 637 FOR MEDICARE OP): Performed by: INTERNAL MEDICINE

## 2024-04-13 PROCEDURE — 2500000001 HC RX 250 WO HCPCS SELF ADMINISTERED DRUGS (ALT 637 FOR MEDICARE OP): Performed by: HOSPITALIST

## 2024-04-13 PROCEDURE — 2500000002 HC RX 250 W HCPCS SELF ADMINISTERED DRUGS (ALT 637 FOR MEDICARE OP, ALT 636 FOR OP/ED): Performed by: INTERNAL MEDICINE

## 2024-04-13 PROCEDURE — 36415 COLL VENOUS BLD VENIPUNCTURE: CPT | Performed by: INTERNAL MEDICINE

## 2024-04-13 PROCEDURE — 82947 ASSAY GLUCOSE BLOOD QUANT: CPT

## 2024-04-13 RX ADMIN — INSULIN LISPRO 3 UNITS: 100 INJECTION, SOLUTION INTRAVENOUS; SUBCUTANEOUS at 16:46

## 2024-04-13 RX ADMIN — ATORVASTATIN CALCIUM 40 MG: 40 TABLET, FILM COATED ORAL at 21:11

## 2024-04-13 RX ADMIN — IPRATROPIUM BROMIDE AND ALBUTEROL SULFATE 3 ML: 2.5; .5 SOLUTION RESPIRATORY (INHALATION) at 14:55

## 2024-04-13 RX ADMIN — IPRATROPIUM BROMIDE AND ALBUTEROL SULFATE 3 ML: 2.5; .5 SOLUTION RESPIRATORY (INHALATION) at 07:23

## 2024-04-13 RX ADMIN — METHYLPREDNISOLONE SODIUM SUCCINATE 30 MG: 40 INJECTION, POWDER, FOR SOLUTION INTRAMUSCULAR; INTRAVENOUS at 06:52

## 2024-04-13 RX ADMIN — DEXTROSE MONOHYDRATE 500 MG: 50 INJECTION, SOLUTION INTRAVENOUS at 15:36

## 2024-04-13 RX ADMIN — CEFTRIAXONE SODIUM 2 G: 2 INJECTION, SOLUTION INTRAVENOUS at 15:00

## 2024-04-13 RX ADMIN — LISINOPRIL 5 MG: 5 TABLET ORAL at 09:29

## 2024-04-13 RX ADMIN — PANTOPRAZOLE SODIUM 20 MG: 20 TABLET, DELAYED RELEASE ORAL at 09:29

## 2024-04-13 RX ADMIN — GLIPIZIDE 5 MG: 5 TABLET ORAL at 16:46

## 2024-04-13 RX ADMIN — PREGABALIN 300 MG: 150 CAPSULE ORAL at 09:29

## 2024-04-13 RX ADMIN — PREGABALIN 300 MG: 150 CAPSULE ORAL at 18:30

## 2024-04-13 RX ADMIN — METHYLPREDNISOLONE SODIUM SUCCINATE 30 MG: 40 INJECTION, POWDER, FOR SOLUTION INTRAMUSCULAR; INTRAVENOUS at 00:12

## 2024-04-13 RX ADMIN — GLIPIZIDE 5 MG: 5 TABLET ORAL at 09:40

## 2024-04-13 RX ADMIN — INSULIN LISPRO 4 UNITS: 100 INJECTION, SOLUTION INTRAVENOUS; SUBCUTANEOUS at 12:17

## 2024-04-13 RX ADMIN — IPRATROPIUM BROMIDE AND ALBUTEROL SULFATE 3 ML: 2.5; .5 SOLUTION RESPIRATORY (INHALATION) at 19:29

## 2024-04-13 RX ADMIN — METFORMIN HYDROCHLORIDE 1000 MG: 1000 TABLET ORAL at 16:46

## 2024-04-13 RX ADMIN — IPRATROPIUM BROMIDE AND ALBUTEROL SULFATE 3 ML: 2.5; .5 SOLUTION RESPIRATORY (INHALATION) at 11:15

## 2024-04-13 RX ADMIN — TIOTROPIUM BROMIDE INHALATION SPRAY 2 PUFF: 3.12 SPRAY, METERED RESPIRATORY (INHALATION) at 07:23

## 2024-04-13 RX ADMIN — INSULIN LISPRO 4 UNITS: 100 INJECTION, SOLUTION INTRAVENOUS; SUBCUTANEOUS at 09:29

## 2024-04-13 RX ADMIN — METFORMIN HYDROCHLORIDE 1000 MG: 1000 TABLET ORAL at 09:29

## 2024-04-13 RX ADMIN — METHYLPREDNISOLONE SODIUM SUCCINATE 30 MG: 40 INJECTION, POWDER, FOR SOLUTION INTRAMUSCULAR; INTRAVENOUS at 15:01

## 2024-04-13 RX ADMIN — INSULIN GLARGINE 20 UNITS: 100 INJECTION, SOLUTION SUBCUTANEOUS at 21:11

## 2024-04-13 RX ADMIN — METHYLPREDNISOLONE SODIUM SUCCINATE 30 MG: 40 INJECTION, POWDER, FOR SOLUTION INTRAMUSCULAR; INTRAVENOUS at 23:32

## 2024-04-13 ASSESSMENT — COGNITIVE AND FUNCTIONAL STATUS - GENERAL: DAILY ACTIVITIY SCORE: 24

## 2024-04-13 ASSESSMENT — PAIN - FUNCTIONAL ASSESSMENT
PAIN_FUNCTIONAL_ASSESSMENT: 0-10
PAIN_FUNCTIONAL_ASSESSMENT: WONG-BAKER FACES

## 2024-04-13 ASSESSMENT — PAIN SCALES - GENERAL
PAINLEVEL_OUTOF10: 0 - NO PAIN

## 2024-04-13 NOTE — CARE PLAN
Problem: Respiratory  Goal: Minimize anxiety/maximize coping throughout shift  Outcome: Progressing  Goal: Minimal/no exertional discomfort or dyspnea this shift  Outcome: Met  Goal: No signs of respiratory distress (eg. Use of accessory muscles. Peds grunting)  Outcome: Met

## 2024-04-13 NOTE — PROGRESS NOTES
Pulmonary Progress Note 04/13/24     Patient seen in follow-up for COPD exacerbation, bacterial pneumonia    Subjective   Interval History:   Feels about the same.  Endorses wheezing and a cough    Objective   Vital signs in last 24 hours:  Temp:  [35.7 °C (96.3 °F)-36.3 °C (97.3 °F)] 36.3 °C (97.3 °F)  Heart Rate:  [78-80] 78  Resp:  [16-21] 16  BP: (123-141)/(59-72) 132/68    Intake/Output last 3 shifts:  I/O last 3 completed shifts:  In: 900 (11.1 mL/kg) [P.O.:600; IV Piggyback:300]  Out: 2400 (29.6 mL/kg) [Urine:2400 (0.8 mL/kg/hr)]  Weight: 81 kg   Intake/Output this shift:  No intake/output data recorded.    Physical Exam  Vitals reviewed.   Constitutional:       General: He is not in acute distress.     Interventions: Nasal cannula in place.   HENT:      Head: Normocephalic and atraumatic.   Eyes:      General: No scleral icterus.     Conjunctiva/sclera: Conjunctivae normal.   Cardiovascular:      Rate and Rhythm: Normal rate and regular rhythm.   Pulmonary:      Effort: Pulmonary effort is normal. No respiratory distress.      Breath sounds: Wheezing present.      Comments: Slightly improved compared with yesterday's exam  Abdominal:      General: Abdomen is flat.      Palpations: Abdomen is soft.   Musculoskeletal:      Right lower leg: No edema.      Left lower leg: No edema.   Skin:     General: Skin is warm and dry.   Neurological:      General: No focal deficit present.      Mental Status: He is alert.   Psychiatric:         Mood and Affect: Mood normal.         Behavior: Behavior normal.         Scheduled medications  atorvastatin, 40 mg, oral, Nightly  azithromycin, 500 mg, intravenous, q24h  cefTRIAXone, 2 g, intravenous, q24h  enoxaparin, 40 mg, subcutaneous, q24h  glipiZIDE, 5 mg, oral, BID AC  insulin glargine, 20 Units, subcutaneous, q24h  insulin lispro, 0-5 Units, subcutaneous, TID with meals  ipratropium-albuteroL, 3 mL, nebulization, 4x daily  lisinopril, 5 mg, oral,  Daily  metFORMIN, 1,000 mg, oral, BID with meals  methylPREDNISolone sodium succinate (PF), 30 mg, intravenous, q8h  pantoprazole, 20 mg, oral, Daily  polyethylene glycol, 17 g, oral, Daily  pregabalin, 300 mg, oral, BID  sodium chloride, 1,500 mL, intravenous, Once  tiotropium, 2 puff, inhalation, Daily      Continuous medications     PRN medications  PRN medications: acetaminophen **OR** acetaminophen **OR** acetaminophen, albuterol, dextrose, dextrose, glucagon, glucagon     Labs:  Lab Results   Component Value Date     04/13/2024    K 4.6 04/13/2024    CL 96 (L) 04/13/2024    CO2 28 04/13/2024    BUN 29 (H) 04/13/2024    CREATININE 0.70 04/13/2024    GLUCOSE 379 (H) 04/13/2024    CALCIUM 9.7 04/13/2024     Lab Results   Component Value Date    WBC 11.5 (H) 04/13/2024    HGB 12.0 (L) 04/13/2024    HCT 36.4 (L) 04/13/2024    MCV 93 04/13/2024     04/13/2024       Imaging:  ECG 12 lead    Result Date: 4/11/2024  Sinus tachycardia ST elevation, consider inferior injury or acute infarct vs repolarization changes Abnormal ECG Abnormal ECG When compared with ECG of 15-APR-2015 01:01, T wave inversion now evident in Anterior leads Confirmed by Jeovanny Schwartz ([id number]) on 4/11/2024 7:52:08 PM    XR chest 1 view    Result Date: 4/11/2024  Interpreted By:  Jus Martinez, STUDY: XR CHEST 1 VIEW;  4/11/2024 2:21 pm   INDICATION: Signs/Symptoms:Chest Pain.   COMPARISON: 07/19/2020   ACCESSION NUMBER(S): [id number]   ORDERING CLINICIAN: ROSANNA POPE   FINDINGS:     AP portable view of the chest is obtained.  Limited exam due to portable nature. Magnified cardiac silhouette. New bilateral patchy infiltrates. No effusion or pneumothorax..       1.  Patchy bilateral infiltrates. Consider multifocal pneumonia or edema. Follow-up is recommended.       MACRO: None   Signed by: Jus Martinez 4/11/2024 2:34 PM Dictation workstation:   [id number]    XR chest 2 views    Result Date: 4/5/2024  EXAMINATION: XR CHEST 2 VIEWS  04/05/2024 08:51 AM CLINICAL HISTORY: [facility] DIAGNOSIS: Dyspnea, unspecified type ORDERING PROVIDER: [employer] NOTE: COMPARISON: None FINDINGS: Cardiomediastinal silhouette: Normal heart size. Trachea: Midline. Aorta: Atherosclerotic calcification. Lungs and pleura: Bi-apical pleural capping/pleural-pulmonary parenchymal scarring. Mild interstitial opacities in the right upper lung. Hyperinflation of the lungs. No pulmonary consolidation, pleural effusion or pneumothorax. Osseous structures: Osteophytic endplate spurring and bridging of several thoracic vertebra. IMPRESSION: 1.  Bi-apical pleural capping/pleural-pulmonary parenchymal scarring. 2.  Mild interstitial opacities in the right upper lung which may be related to interstitial fibrosis versus atypical/viral pneumonia. 3.  Chronic obstructive pulmonary disease/emphysema. MACRO: None              Assessment/Plan   Principal Problem:    Acute hypoxic respiratory failure (Multi)  Active Problems:    COPD with acute exacerbation (Multi)    Pneumonia due to infectious organism    Dependence on nicotine from cigarettes    Acute respiratory failure with hypoxia: In the setting of pneumonia and airway disease  Continue with supplemental oxygen hopefully we can wean as tolerated  Incentive spirometry  COPD with acute exacerbation  Taper steroids given persistent hyperglycemia to 30 mg IV every 12  Aerosolized bronchodilators  Aerosolized corticosteroids  FEV1 once optimized and/or prior to discharge  Probable bacterial pneumonia  Agree with ceftriaxone and azithromycin, cover for gram-positive and atypical  Sputum culture if able  Diabetes mellitus with hyperglycemia  Glycemic control per primary.    Nicotine dependence  Smoking cessation was discussed and strongly advised  Prophylaxis  Enoxaparin noted    Overall slight interval improvement.  Suspect will need another 24 to 48 hours based on today's exam.     LOS: 2 days       Anjum'  Nathen HOFFMANN  Pulmonary/Critical Care medicine

## 2024-04-13 NOTE — NURSING NOTE
Assumed patient care. Patient is sitting up in bed. Patient has no needs at this time. Says he feels pretty good right now.

## 2024-04-13 NOTE — PROGRESS NOTES
Aaron Shaw is a 68 y.o. male on day 2 of admission presenting with Acute hypoxic respiratory failure (Multi).      Subjective   Shortness of breath improved off of BIPAP.        Objective     Last Recorded Vitals  /72 (BP Location: Right arm, Patient Position: Lying)   Pulse 80   Temp 36.2 °C (97.2 °F) (Temporal)   Resp 21   Wt 81 kg (178 lb 9.2 oz)   SpO2 98%   Intake/Output last 3 Shifts:    Intake/Output Summary (Last 24 hours) at 4/13/2024 0618  Last data filed at 4/13/2024 0353  Gross per 24 hour   Intake 900 ml   Output 1900 ml   Net -1000 ml       Admission Weight  Weight: 79.8 kg (175 lb 14.8 oz) (04/11/24 1311)    Daily Weight  04/11/24 : 81 kg (178 lb 9.2 oz)    Image Results  ECG 12 lead  Sinus tachycardia  ST elevation, consider inferior injury or acute infarct vs repolarization changes  Abnormal ECG    Abnormal ECG  When compared with ECG of 15-APR-2015 01:01,  T wave inversion now evident in Anterior leads  Confirmed by Jeovanny Schwatrz ([id number]) on 4/11/2024 7:52:08 PM  XR chest 1 view  Narrative: Interpreted By:  Jus Martinez,   STUDY:  XR CHEST 1 VIEW;  4/11/2024 2:21 pm      INDICATION:  Signs/Symptoms:Chest Pain.      COMPARISON:  07/19/2020      ACCESSION NUMBER(S):  [id number]      ORDERING CLINICIAN:  ROSANNA POPE      FINDINGS:          AP portable view of the chest is obtained.  Limited exam due to  portable nature. Magnified cardiac silhouette. New bilateral patchy  infiltrates. No effusion or pneumothorax..      Impression: 1.  Patchy bilateral infiltrates. Consider multifocal pneumonia or  edema. Follow-up is recommended.              MACRO:  None      Signed by: Jus Martinez 4/11/2024 2:34 PM  Dictation workstation:   [id number]      Physical Exam        Constitutional:       Appearance: Middle-aged, well-built, in no distress  HENT:      Head: Normocephalic and atraumatic.   Eyes:      Extraocular Movements: Extraocular movements intact.      Pupils: Pupils are equal,  round, and reactive to light.   Cardiovascular:      Rate and Rhythm: Normal rate and regular rhythm.      Pulses: Normal pulses.      Heart sounds: Normal heart sounds.   Pulmonary:      Effort: Pulmonary effort is normal.      Breath sounds: Minimal wheeze, rhonchi in bases.   Abdominal:      General: Abdomen is flat. Bowel sounds are normal.      Palpations: Abdomen is soft.   Musculoskeletal:         General: Normal range of motion.      Cervical back: Normal range of motion and neck supple.   Skin:     General: Skin is warm and dry.   Neurological:      General: No focal deficit present.      Mental Status: He is alert and oriented to person, place, and time. Mental status is at baseline.            Assessment/Plan      COPD exacerbation -aerosol treatments, steroids, antibiotics. Secondary to pna, respiratory status improving. Steroids to be weaned per pulm.   Diabetes type 2 -Insulin and regimen as ordered, BS likely to improve as steroids are weaned. Monitor closely.   Nicotine abuse -encourage cessation.   Back pain -Regimen as ordered.            Darryl Haider MD

## 2024-04-14 LAB
ANION GAP SERPL CALC-SCNC: 11 MMOL/L
ANION GAP SERPL CALC-SCNC: 11 MMOL/L
BASOPHILS # BLD AUTO: 0 X10*3/UL (ref 0–0.1)
BASOPHILS NFR BLD AUTO: 0 %
BUN SERPL-MCNC: 28 MG/DL (ref 8–25)
BUN SERPL-MCNC: 30 MG/DL (ref 8–25)
CALCIUM SERPL-MCNC: 10.1 MG/DL (ref 8.5–10.4)
CALCIUM SERPL-MCNC: 9.7 MG/DL (ref 8.5–10.4)
CHLORIDE SERPL-SCNC: 94 MMOL/L (ref 97–107)
CHLORIDE SERPL-SCNC: 97 MMOL/L (ref 97–107)
CO2 SERPL-SCNC: 28 MMOL/L (ref 24–31)
CO2 SERPL-SCNC: 30 MMOL/L (ref 24–31)
CREAT SERPL-MCNC: 0.6 MG/DL (ref 0.4–1.6)
CREAT SERPL-MCNC: 0.6 MG/DL (ref 0.4–1.6)
EGFRCR SERPLBLD CKD-EPI 2021: >90 ML/MIN/1.73M*2
EGFRCR SERPLBLD CKD-EPI 2021: >90 ML/MIN/1.73M*2
EOSINOPHIL # BLD AUTO: 0 X10*3/UL (ref 0–0.7)
EOSINOPHIL NFR BLD AUTO: 0 %
ERYTHROCYTE [DISTWIDTH] IN BLOOD BY AUTOMATED COUNT: 13.3 % (ref 11.5–14.5)
GLUCOSE BLD MANUAL STRIP-MCNC: 225 MG/DL (ref 74–99)
GLUCOSE BLD MANUAL STRIP-MCNC: 229 MG/DL (ref 74–99)
GLUCOSE BLD MANUAL STRIP-MCNC: 291 MG/DL (ref 74–99)
GLUCOSE BLD MANUAL STRIP-MCNC: 306 MG/DL (ref 74–99)
GLUCOSE BLD MANUAL STRIP-MCNC: 312 MG/DL (ref 74–99)
GLUCOSE BLD MANUAL STRIP-MCNC: 318 MG/DL (ref 74–99)
GLUCOSE SERPL-MCNC: 324 MG/DL (ref 65–99)
GLUCOSE SERPL-MCNC: 325 MG/DL (ref 65–99)
HCT VFR BLD AUTO: 37.3 % (ref 41–52)
HGB BLD-MCNC: 12.4 G/DL (ref 13.5–17.5)
HOLD SPECIMEN: NORMAL
IMM GRANULOCYTES # BLD AUTO: 0.09 X10*3/UL (ref 0–0.7)
IMM GRANULOCYTES NFR BLD AUTO: 1 % (ref 0–0.9)
LYMPHOCYTES # BLD AUTO: 0.69 X10*3/UL (ref 1.2–4.8)
LYMPHOCYTES NFR BLD AUTO: 7.7 %
MCH RBC QN AUTO: 31 PG (ref 26–34)
MCHC RBC AUTO-ENTMCNC: 33.2 G/DL (ref 32–36)
MCV RBC AUTO: 93 FL (ref 80–100)
MONOCYTES # BLD AUTO: 0.37 X10*3/UL (ref 0.1–1)
MONOCYTES NFR BLD AUTO: 4.1 %
NEUTROPHILS # BLD AUTO: 7.77 X10*3/UL (ref 1.2–7.7)
NEUTROPHILS NFR BLD AUTO: 87.2 %
NRBC BLD-RTO: 0 /100 WBCS (ref 0–0)
PLATELET # BLD AUTO: 365 X10*3/UL (ref 150–450)
POTASSIUM SERPL-SCNC: 4.9 MMOL/L (ref 3.4–5.1)
POTASSIUM SERPL-SCNC: 5.7 MMOL/L (ref 3.4–5.1)
RBC # BLD AUTO: 4 X10*6/UL (ref 4.5–5.9)
SODIUM SERPL-SCNC: 133 MMOL/L (ref 133–145)
SODIUM SERPL-SCNC: 138 MMOL/L (ref 133–145)
WBC # BLD AUTO: 8.9 X10*3/UL (ref 4.4–11.3)

## 2024-04-14 PROCEDURE — 85025 COMPLETE CBC W/AUTO DIFF WBC: CPT | Performed by: INTERNAL MEDICINE

## 2024-04-14 PROCEDURE — 36415 COLL VENOUS BLD VENIPUNCTURE: CPT | Performed by: INTERNAL MEDICINE

## 2024-04-14 PROCEDURE — 2060000001 HC INTERMEDIATE ICU ROOM DAILY

## 2024-04-14 PROCEDURE — 2500000001 HC RX 250 WO HCPCS SELF ADMINISTERED DRUGS (ALT 637 FOR MEDICARE OP): Performed by: INTERNAL MEDICINE

## 2024-04-14 PROCEDURE — 82374 ASSAY BLOOD CARBON DIOXIDE: CPT | Performed by: INTERNAL MEDICINE

## 2024-04-14 PROCEDURE — 2500000002 HC RX 250 W HCPCS SELF ADMINISTERED DRUGS (ALT 637 FOR MEDICARE OP, ALT 636 FOR OP/ED): Performed by: INTERNAL MEDICINE

## 2024-04-14 PROCEDURE — 94640 AIRWAY INHALATION TREATMENT: CPT

## 2024-04-14 PROCEDURE — 80048 BASIC METABOLIC PNL TOTAL CA: CPT | Performed by: INTERNAL MEDICINE

## 2024-04-14 PROCEDURE — 2500000002 HC RX 250 W HCPCS SELF ADMINISTERED DRUGS (ALT 637 FOR MEDICARE OP, ALT 636 FOR OP/ED): Performed by: HOSPITALIST

## 2024-04-14 PROCEDURE — 82947 ASSAY GLUCOSE BLOOD QUANT: CPT

## 2024-04-14 PROCEDURE — 2500000004 HC RX 250 GENERAL PHARMACY W/ HCPCS (ALT 636 FOR OP/ED): Performed by: INTERNAL MEDICINE

## 2024-04-14 PROCEDURE — 9420000001 HC RT PATIENT EDUCATION 5 MIN

## 2024-04-14 PROCEDURE — 2500000004 HC RX 250 GENERAL PHARMACY W/ HCPCS (ALT 636 FOR OP/ED): Performed by: HOSPITALIST

## 2024-04-14 RX ORDER — IPRATROPIUM BROMIDE AND ALBUTEROL SULFATE 2.5; .5 MG/3ML; MG/3ML
3 SOLUTION RESPIRATORY (INHALATION)
Status: DISCONTINUED | OUTPATIENT
Start: 2024-04-14 | End: 2024-04-16 | Stop reason: HOSPADM

## 2024-04-14 RX ORDER — INSULIN GLARGINE 100 [IU]/ML
30 INJECTION, SOLUTION SUBCUTANEOUS EVERY 24 HOURS
Status: DISCONTINUED | OUTPATIENT
Start: 2024-04-14 | End: 2024-04-16 | Stop reason: HOSPADM

## 2024-04-14 RX ORDER — BUDESONIDE 0.5 MG/2ML
0.5 INHALANT ORAL
Qty: 12 ML | Refills: 0 | Status: DISCONTINUED | OUTPATIENT
Start: 2024-04-14 | End: 2024-04-16 | Stop reason: HOSPADM

## 2024-04-14 RX ADMIN — LISINOPRIL 5 MG: 5 TABLET ORAL at 08:57

## 2024-04-14 RX ADMIN — METHYLPREDNISOLONE SODIUM SUCCINATE 30 MG: 40 INJECTION, POWDER, FOR SOLUTION INTRAMUSCULAR; INTRAVENOUS at 16:57

## 2024-04-14 RX ADMIN — INSULIN LISPRO 4 UNITS: 100 INJECTION, SOLUTION INTRAVENOUS; SUBCUTANEOUS at 08:58

## 2024-04-14 RX ADMIN — PANTOPRAZOLE SODIUM 20 MG: 20 TABLET, DELAYED RELEASE ORAL at 08:57

## 2024-04-14 RX ADMIN — IPRATROPIUM BROMIDE AND ALBUTEROL SULFATE 3 ML: 2.5; .5 SOLUTION RESPIRATORY (INHALATION) at 11:33

## 2024-04-14 RX ADMIN — PREGABALIN 300 MG: 150 CAPSULE ORAL at 08:57

## 2024-04-14 RX ADMIN — METHYLPREDNISOLONE SODIUM SUCCINATE 30 MG: 40 INJECTION, POWDER, FOR SOLUTION INTRAMUSCULAR; INTRAVENOUS at 23:43

## 2024-04-14 RX ADMIN — METFORMIN HYDROCHLORIDE 1000 MG: 1000 TABLET ORAL at 16:57

## 2024-04-14 RX ADMIN — PREGABALIN 300 MG: 150 CAPSULE ORAL at 18:53

## 2024-04-14 RX ADMIN — GLIPIZIDE 5 MG: 5 TABLET ORAL at 06:56

## 2024-04-14 RX ADMIN — CEFTRIAXONE SODIUM 2 G: 2 INJECTION, SOLUTION INTRAVENOUS at 15:24

## 2024-04-14 RX ADMIN — INSULIN LISPRO 2 UNITS: 100 INJECTION, SOLUTION INTRAVENOUS; SUBCUTANEOUS at 16:57

## 2024-04-14 RX ADMIN — METHYLPREDNISOLONE SODIUM SUCCINATE 30 MG: 40 INJECTION, POWDER, FOR SOLUTION INTRAMUSCULAR; INTRAVENOUS at 06:56

## 2024-04-14 RX ADMIN — DEXTROSE MONOHYDRATE 500 MG: 50 INJECTION, SOLUTION INTRAVENOUS at 15:25

## 2024-04-14 RX ADMIN — METFORMIN HYDROCHLORIDE 1000 MG: 1000 TABLET ORAL at 08:57

## 2024-04-14 RX ADMIN — TIOTROPIUM BROMIDE INHALATION SPRAY 2 PUFF: 3.12 SPRAY, METERED RESPIRATORY (INHALATION) at 06:38

## 2024-04-14 RX ADMIN — IPRATROPIUM BROMIDE AND ALBUTEROL SULFATE 3 ML: 2.5; .5 SOLUTION RESPIRATORY (INHALATION) at 19:21

## 2024-04-14 RX ADMIN — INSULIN LISPRO 2 UNITS: 100 INJECTION, SOLUTION INTRAVENOUS; SUBCUTANEOUS at 12:54

## 2024-04-14 RX ADMIN — BUDESONIDE INHALATION 0.5 MG: 0.5 SUSPENSION RESPIRATORY (INHALATION) at 19:21

## 2024-04-14 RX ADMIN — GLIPIZIDE 5 MG: 5 TABLET ORAL at 16:57

## 2024-04-14 RX ADMIN — IPRATROPIUM BROMIDE AND ALBUTEROL SULFATE 3 ML: 2.5; .5 SOLUTION RESPIRATORY (INHALATION) at 06:38

## 2024-04-14 RX ADMIN — INSULIN GLARGINE 30 UNITS: 100 INJECTION, SOLUTION SUBCUTANEOUS at 20:32

## 2024-04-14 RX ADMIN — ATORVASTATIN CALCIUM 40 MG: 40 TABLET, FILM COATED ORAL at 20:32

## 2024-04-14 ASSESSMENT — COGNITIVE AND FUNCTIONAL STATUS - GENERAL
MOVING FROM LYING ON BACK TO SITTING ON SIDE OF FLAT BED WITH BEDRAILS: A LITTLE
MOBILITY SCORE: 18
DAILY ACTIVITIY SCORE: 24
CLIMB 3 TO 5 STEPS WITH RAILING: A LITTLE
WALKING IN HOSPITAL ROOM: A LITTLE
TURNING FROM BACK TO SIDE WHILE IN FLAT BAD: A LITTLE
MOVING TO AND FROM BED TO CHAIR: A LITTLE
STANDING UP FROM CHAIR USING ARMS: A LITTLE

## 2024-04-14 ASSESSMENT — PAIN SCALES - GENERAL
PAINLEVEL_OUTOF10: 0 - NO PAIN

## 2024-04-14 ASSESSMENT — PAIN - FUNCTIONAL ASSESSMENT
PAIN_FUNCTIONAL_ASSESSMENT: 0-10
PAIN_FUNCTIONAL_ASSESSMENT: FLACC (FACE, LEGS, ACTIVITY, CRY, CONSOLABILITY)
PAIN_FUNCTIONAL_ASSESSMENT: 0-10

## 2024-04-14 NOTE — CARE PLAN
The patient's goals for the shift include      The clinical goals for the shift include Patient to remain free from injury    Over the shift, the patient did not make progress toward the following goals. Barriers to progression include BLE weakness. Recommendations to address these barriers include encourage pt to call for help when ambulating, bed alarm.

## 2024-04-14 NOTE — PROGRESS NOTES
Pulmonary Progress Note 04/14/24     Patient seen in follow-up for COPD exacerbation, bacterial pneumonia    Subjective   Interval History:   About the same.  No significant overnight events.  Still with wheezing.      Objective   Vital signs in last 24 hours:  Temp:  [36 °C (96.8 °F)-36.8 °C (98.2 °F)] 36.3 °C (97.3 °F)  Heart Rate:  [78-86] 84  Resp:  [16-18] 18  BP: (132-149)/(63-75) 138/72    Intake/Output last 3 shifts:  I/O last 3 completed shifts:  In: 240 (3 mL/kg) [P.O.:240]  Out: 2000 (24.7 mL/kg) [Urine:2000 (0.7 mL/kg/hr)]  Weight: 81 kg   Intake/Output this shift:  No intake/output data recorded.    Physical Exam  Vitals reviewed.   Constitutional:       General: He is not in acute distress.     Interventions: Nasal cannula in place.   HENT:      Head: Normocephalic and atraumatic.   Eyes:      General: No scleral icterus.     Conjunctiva/sclera: Conjunctivae normal.   Cardiovascular:      Rate and Rhythm: Normal rate and regular rhythm.   Pulmonary:      Effort: Pulmonary effort is normal. No respiratory distress.      Breath sounds: Wheezing present.      Comments: Although improved compared with admission, similar compared with yesterday  Abdominal:      General: Abdomen is flat.      Palpations: Abdomen is soft.   Musculoskeletal:      Right lower leg: No edema.      Left lower leg: No edema.   Skin:     General: Skin is warm and dry.   Neurological:      General: No focal deficit present.      Mental Status: He is alert.   Psychiatric:         Mood and Affect: Mood normal.         Behavior: Behavior normal.         Scheduled medications  atorvastatin, 40 mg, oral, Nightly  azithromycin, 500 mg, intravenous, q24h  cefTRIAXone, 2 g, intravenous, q24h  enoxaparin, 40 mg, subcutaneous, q24h  glipiZIDE, 5 mg, oral, BID AC  insulin glargine, 20 Units, subcutaneous, q24h  insulin lispro, 0-5 Units, subcutaneous, TID with meals  ipratropium-albuteroL, 3 mL, nebulization, 4x daily  lisinopril,  5 mg, oral, Daily  metFORMIN, 1,000 mg, oral, BID with meals  methylPREDNISolone sodium succinate (PF), 30 mg, intravenous, q8h  pantoprazole, 20 mg, oral, Daily  polyethylene glycol, 17 g, oral, Daily  pregabalin, 300 mg, oral, BID  sodium chloride, 1,500 mL, intravenous, Once  tiotropium, 2 puff, inhalation, Daily      Continuous medications     PRN medications  PRN medications: acetaminophen **OR** acetaminophen **OR** acetaminophen, albuterol, dextrose, dextrose, glucagon, glucagon     Labs:  Lab Results   Component Value Date     04/14/2024    K 4.9 04/14/2024    CL 94 (L) 04/14/2024    CO2 28 04/14/2024    BUN 28 (H) 04/14/2024    CREATININE 0.60 04/14/2024    GLUCOSE 324 (H) 04/14/2024    CALCIUM 9.7 04/14/2024     Lab Results   Component Value Date    WBC 8.9 04/14/2024    HGB 12.4 (L) 04/14/2024    HCT 37.3 (L) 04/14/2024    MCV 93 04/14/2024     04/14/2024       Imaging:  ECG 12 lead    Result Date: 4/11/2024  Sinus tachycardia ST elevation, consider inferior injury or acute infarct vs repolarization changes Abnormal ECG Abnormal ECG When compared with ECG of 15-APR-2015 01:01, T wave inversion now evident in Anterior leads Confirmed by Jeovanny Schwartz ([id number]) on 4/11/2024 7:52:08 PM    XR chest 1 view    Result Date: 4/11/2024  Interpreted By:  Jus Martinez, STUDY: XR CHEST 1 VIEW;  4/11/2024 2:21 pm   INDICATION: Signs/Symptoms:Chest Pain.   COMPARISON: 07/19/2020   ACCESSION NUMBER(S): [id number]   ORDERING CLINICIAN: ROSANNA POPE   FINDINGS:     AP portable view of the chest is obtained.  Limited exam due to portable nature. Magnified cardiac silhouette. New bilateral patchy infiltrates. No effusion or pneumothorax..       1.  Patchy bilateral infiltrates. Consider multifocal pneumonia or edema. Follow-up is recommended.       MACRO: None   Signed by: Jus Martinez 4/11/2024 2:34 PM Dictation workstation:   [id number]    XR chest 2 views    Result Date: 4/5/2024  EXAMINATION: XR CHEST 2  VIEWS 04/05/2024 08:51 AM CLINICAL HISTORY: [facility] DIAGNOSIS: Dyspnea, unspecified type ORDERING PROVIDER: [employer] NOTE: COMPARISON: None FINDINGS: Cardiomediastinal silhouette: Normal heart size. Trachea: Midline. Aorta: Atherosclerotic calcification. Lungs and pleura: Bi-apical pleural capping/pleural-pulmonary parenchymal scarring. Mild interstitial opacities in the right upper lung. Hyperinflation of the lungs. No pulmonary consolidation, pleural effusion or pneumothorax. Osseous structures: Osteophytic endplate spurring and bridging of several thoracic vertebra. IMPRESSION: 1.  Bi-apical pleural capping/pleural-pulmonary parenchymal scarring. 2.  Mild interstitial opacities in the right upper lung which may be related to interstitial fibrosis versus atypical/viral pneumonia. 3.  Chronic obstructive pulmonary disease/emphysema. MACRO: None              Assessment/Plan   Principal Problem:    Acute hypoxic respiratory failure (Multi)  Active Problems:    COPD with acute exacerbation (Multi)    Pneumonia due to infectious organism    Dependence on nicotine from cigarettes    Acute respiratory failure with hypoxia: In the setting of pneumonia and airway disease  Continue with supplemental oxygen hopefully we can wean as tolerated  Incentive spirometry  COPD with acute exacerbation  Continue with current steroid dosing  Given persistent wheezing, will add aerosolized corticosteroids  Aerosolized bronchodilators  Aerosolized corticosteroids  FEV1 once optimized and/or prior to discharge  Probable bacterial pneumonia  Agree with ceftriaxone and azithromycin, cover for gram-positive and atypical  Sputum culture if able  Diabetes mellitus with hyperglycemia  Glycemic control per primary.    Nicotine dependence  Smoking cessation was discussed and strongly advised  Prophylaxis  Enoxaparin noted    Needs another 24 hours of treatment minimum.  Would not be surprised if 48.  Will see with  adjustment addition of nebulized steroids to his regimen if we can make any improvement in his airway disease.     LOS: 3 days       Olimpia Sena MD  Pulmonary/Critical Care medicine

## 2024-04-14 NOTE — NURSING NOTE
Assume care of patient. Pt lying quietly in bed currently having ordered breathing treatment. Pt denies any pain or needs at this time. Call light within reach. Continue with plan of care.

## 2024-04-14 NOTE — PROGRESS NOTES
Aaron Shaw is a 68 y.o. male on day 3 of admission presenting with Acute hypoxic respiratory failure (Multi).      Subjective   Remains on oxygen, sob noted with ambulation.        Objective     Last Recorded Vitals  /72 (BP Location: Right arm, Patient Position: Lying)   Pulse 84   Temp 36.3 °C (97.3 °F) (Temporal)   Resp 18   Wt 81 kg (178 lb 9.2 oz)   SpO2 94%   Intake/Output last 3 Shifts:    Intake/Output Summary (Last 24 hours) at 4/14/2024 0729  Last data filed at 4/14/2024 0427  Gross per 24 hour   Intake 240 ml   Output 900 ml   Net -660 ml       Admission Weight  Weight: 79.8 kg (175 lb 14.8 oz) (04/11/24 1311)    Daily Weight  04/11/24 : 81 kg (178 lb 9.2 oz)    Image Results  ECG 12 lead  Sinus tachycardia  ST elevation, consider inferior injury or acute infarct vs repolarization changes  Abnormal ECG    Abnormal ECG  When compared with ECG of 15-APR-2015 01:01,  T wave inversion now evident in Anterior leads  Confirmed by Jeovanny Schwartz ([id number]) on 4/11/2024 7:52:08 PM  XR chest 1 view  Narrative: Interpreted By:  Jus Martinez,   STUDY:  XR CHEST 1 VIEW;  4/11/2024 2:21 pm      INDICATION:  Signs/Symptoms:Chest Pain.      COMPARISON:  07/19/2020      ACCESSION NUMBER(S):  [id number]      ORDERING CLINICIAN:  ROSANNA POPE      FINDINGS:          AP portable view of the chest is obtained.  Limited exam due to  portable nature. Magnified cardiac silhouette. New bilateral patchy  infiltrates. No effusion or pneumothorax..      Impression: 1.  Patchy bilateral infiltrates. Consider multifocal pneumonia or  edema. Follow-up is recommended.              MACRO:  None      Signed by: Jus Martinez 4/11/2024 2:34 PM  Dictation workstation:   [id number]    Physical Exam        Constitutional:       Appearance: Middle-aged, well-built, in no distress  HENT:      Head: Normocephalic and atraumatic.   Eyes:      Extraocular Movements: Extraocular movements intact.      Pupils: Pupils are equal,  round, and reactive to light.   Cardiovascular:      Rate and Rhythm: Normal rate and regular rhythm.      Pulses: Normal pulses.      Heart sounds: Normal heart sounds.   Pulmonary:      Effort: Pulmonary effort is normal.      Breath sounds: Minimal wheeze few rhonchi in bases.   Abdominal:      General: Abdomen is flat. Bowel sounds are normal.      Palpations: Abdomen is soft.   Musculoskeletal:         General: Normal range of motion.      Cervical back: Normal range of motion and neck supple.   Skin:     General: Skin is warm and dry.   Neurological:      General: No focal deficit present.      Mental Status: He is alert and oriented to person, place, and time. Mental status is at baseline.      Assessment/Plan        COPD exacerbation -aerosol treatments, steroids, antibiotics. Secondary to pna, respiratory status improving daily. Remains on oxygen. Steroids to be weaned per pulm.   Diabetes type 2 -BS remain elevated, will increase lantus dosing. Monitor for effectiveness.   Nicotine abuse -encourage cessation.   Back pain -Regimen as ordered. No c/o back pain.        Possible dc tomorrow if s/s continue to improve.        Darryl Haider MD

## 2024-04-15 LAB
ANION GAP SERPL CALC-SCNC: 9 MMOL/L
BACTERIA BLD CULT: NORMAL
BACTERIA BLD CULT: NORMAL
BASOPHILS # BLD AUTO: 0.01 X10*3/UL (ref 0–0.1)
BASOPHILS NFR BLD AUTO: 0.1 %
BUN SERPL-MCNC: 29 MG/DL (ref 8–25)
CALCIUM SERPL-MCNC: 9.9 MG/DL (ref 8.5–10.4)
CHLORIDE SERPL-SCNC: 94 MMOL/L (ref 97–107)
CO2 SERPL-SCNC: 30 MMOL/L (ref 24–31)
CREAT SERPL-MCNC: 0.6 MG/DL (ref 0.4–1.6)
EGFRCR SERPLBLD CKD-EPI 2021: >90 ML/MIN/1.73M*2
EOSINOPHIL # BLD AUTO: 0 X10*3/UL (ref 0–0.7)
EOSINOPHIL NFR BLD AUTO: 0 %
ERYTHROCYTE [DISTWIDTH] IN BLOOD BY AUTOMATED COUNT: 13 % (ref 11.5–14.5)
GLUCOSE BLD MANUAL STRIP-MCNC: 260 MG/DL (ref 74–99)
GLUCOSE BLD MANUAL STRIP-MCNC: 291 MG/DL (ref 74–99)
GLUCOSE BLD MANUAL STRIP-MCNC: 292 MG/DL (ref 74–99)
GLUCOSE BLD MANUAL STRIP-MCNC: 294 MG/DL (ref 74–99)
GLUCOSE SERPL-MCNC: 293 MG/DL (ref 65–99)
HCT VFR BLD AUTO: 36.9 % (ref 41–52)
HGB BLD-MCNC: 12.1 G/DL (ref 13.5–17.5)
IMM GRANULOCYTES # BLD AUTO: 0.36 X10*3/UL (ref 0–0.7)
IMM GRANULOCYTES NFR BLD AUTO: 4.6 % (ref 0–0.9)
LYMPHOCYTES # BLD AUTO: 0.83 X10*3/UL (ref 1.2–4.8)
LYMPHOCYTES NFR BLD AUTO: 10.6 %
MCH RBC QN AUTO: 30.1 PG (ref 26–34)
MCHC RBC AUTO-ENTMCNC: 32.8 G/DL (ref 32–36)
MCV RBC AUTO: 92 FL (ref 80–100)
MONOCYTES # BLD AUTO: 0.38 X10*3/UL (ref 0.1–1)
MONOCYTES NFR BLD AUTO: 4.9 %
NEUTROPHILS # BLD AUTO: 6.25 X10*3/UL (ref 1.2–7.7)
NEUTROPHILS NFR BLD AUTO: 79.8 %
NRBC BLD-RTO: 0 /100 WBCS (ref 0–0)
PLATELET # BLD AUTO: 337 X10*3/UL (ref 150–450)
POTASSIUM SERPL-SCNC: 5.8 MMOL/L (ref 3.4–5.1)
RBC # BLD AUTO: 4.02 X10*6/UL (ref 4.5–5.9)
SODIUM SERPL-SCNC: 133 MMOL/L (ref 133–145)
WBC # BLD AUTO: 7.8 X10*3/UL (ref 4.4–11.3)

## 2024-04-15 PROCEDURE — 2500000001 HC RX 250 WO HCPCS SELF ADMINISTERED DRUGS (ALT 637 FOR MEDICARE OP): Performed by: INTERNAL MEDICINE

## 2024-04-15 PROCEDURE — 80048 BASIC METABOLIC PNL TOTAL CA: CPT | Performed by: INTERNAL MEDICINE

## 2024-04-15 PROCEDURE — 94640 AIRWAY INHALATION TREATMENT: CPT | Mod: MUE

## 2024-04-15 PROCEDURE — 94640 AIRWAY INHALATION TREATMENT: CPT

## 2024-04-15 PROCEDURE — 2060000001 HC INTERMEDIATE ICU ROOM DAILY

## 2024-04-15 PROCEDURE — 2500000002 HC RX 250 W HCPCS SELF ADMINISTERED DRUGS (ALT 637 FOR MEDICARE OP, ALT 636 FOR OP/ED): Performed by: INTERNAL MEDICINE

## 2024-04-15 PROCEDURE — 85025 COMPLETE CBC W/AUTO DIFF WBC: CPT | Performed by: INTERNAL MEDICINE

## 2024-04-15 PROCEDURE — 82947 ASSAY GLUCOSE BLOOD QUANT: CPT

## 2024-04-15 PROCEDURE — 2500000004 HC RX 250 GENERAL PHARMACY W/ HCPCS (ALT 636 FOR OP/ED): Performed by: INTERNAL MEDICINE

## 2024-04-15 PROCEDURE — 36415 COLL VENOUS BLD VENIPUNCTURE: CPT | Performed by: INTERNAL MEDICINE

## 2024-04-15 PROCEDURE — 2500000004 HC RX 250 GENERAL PHARMACY W/ HCPCS (ALT 636 FOR OP/ED): Performed by: HOSPITALIST

## 2024-04-15 PROCEDURE — 9420000001 HC RT PATIENT EDUCATION 5 MIN

## 2024-04-15 PROCEDURE — 2500000002 HC RX 250 W HCPCS SELF ADMINISTERED DRUGS (ALT 637 FOR MEDICARE OP, ALT 636 FOR OP/ED): Performed by: HOSPITALIST

## 2024-04-15 RX ADMIN — ATORVASTATIN CALCIUM 40 MG: 40 TABLET, FILM COATED ORAL at 21:20

## 2024-04-15 RX ADMIN — INSULIN LISPRO 3 UNITS: 100 INJECTION, SOLUTION INTRAVENOUS; SUBCUTANEOUS at 08:49

## 2024-04-15 RX ADMIN — INSULIN LISPRO 3 UNITS: 100 INJECTION, SOLUTION INTRAVENOUS; SUBCUTANEOUS at 17:29

## 2024-04-15 RX ADMIN — INSULIN LISPRO 3 UNITS: 100 INJECTION, SOLUTION INTRAVENOUS; SUBCUTANEOUS at 13:21

## 2024-04-15 RX ADMIN — CEFTRIAXONE SODIUM 2 G: 2 INJECTION, SOLUTION INTRAVENOUS at 13:22

## 2024-04-15 RX ADMIN — INSULIN GLARGINE 30 UNITS: 100 INJECTION, SOLUTION SUBCUTANEOUS at 21:20

## 2024-04-15 RX ADMIN — PANTOPRAZOLE SODIUM 20 MG: 20 TABLET, DELAYED RELEASE ORAL at 08:48

## 2024-04-15 RX ADMIN — IPRATROPIUM BROMIDE AND ALBUTEROL SULFATE 3 ML: 2.5; .5 SOLUTION RESPIRATORY (INHALATION) at 12:03

## 2024-04-15 RX ADMIN — GLIPIZIDE 5 MG: 5 TABLET ORAL at 06:35

## 2024-04-15 RX ADMIN — METFORMIN HYDROCHLORIDE 1000 MG: 1000 TABLET ORAL at 08:49

## 2024-04-15 RX ADMIN — GLIPIZIDE 5 MG: 5 TABLET ORAL at 16:30

## 2024-04-15 RX ADMIN — TIOTROPIUM BROMIDE INHALATION SPRAY 2 PUFF: 3.12 SPRAY, METERED RESPIRATORY (INHALATION) at 07:00

## 2024-04-15 RX ADMIN — IPRATROPIUM BROMIDE AND ALBUTEROL SULFATE 3 ML: 2.5; .5 SOLUTION RESPIRATORY (INHALATION) at 19:37

## 2024-04-15 RX ADMIN — PREGABALIN 300 MG: 150 CAPSULE ORAL at 17:28

## 2024-04-15 RX ADMIN — METHYLPREDNISOLONE SODIUM SUCCINATE 30 MG: 40 INJECTION, POWDER, FOR SOLUTION INTRAMUSCULAR; INTRAVENOUS at 06:35

## 2024-04-15 RX ADMIN — BUDESONIDE INHALATION 0.5 MG: 0.5 SUSPENSION RESPIRATORY (INHALATION) at 19:33

## 2024-04-15 RX ADMIN — ENOXAPARIN SODIUM 40 MG: 40 INJECTION SUBCUTANEOUS at 08:48

## 2024-04-15 RX ADMIN — METFORMIN HYDROCHLORIDE 1000 MG: 1000 TABLET ORAL at 16:30

## 2024-04-15 RX ADMIN — DEXTROSE MONOHYDRATE 500 MG: 50 INJECTION, SOLUTION INTRAVENOUS at 14:44

## 2024-04-15 RX ADMIN — BUDESONIDE INHALATION 0.5 MG: 0.5 SUSPENSION RESPIRATORY (INHALATION) at 06:59

## 2024-04-15 RX ADMIN — IPRATROPIUM BROMIDE AND ALBUTEROL SULFATE 3 ML: 2.5; .5 SOLUTION RESPIRATORY (INHALATION) at 06:59

## 2024-04-15 RX ADMIN — LISINOPRIL 5 MG: 5 TABLET ORAL at 08:48

## 2024-04-15 RX ADMIN — PREGABALIN 300 MG: 150 CAPSULE ORAL at 08:48

## 2024-04-15 RX ADMIN — METHYLPREDNISOLONE SODIUM SUCCINATE 30 MG: 40 INJECTION, POWDER, FOR SOLUTION INTRAMUSCULAR; INTRAVENOUS at 14:45

## 2024-04-15 ASSESSMENT — COGNITIVE AND FUNCTIONAL STATUS - GENERAL
CLIMB 3 TO 5 STEPS WITH RAILING: A LITTLE
DAILY ACTIVITIY SCORE: 24
WALKING IN HOSPITAL ROOM: A LITTLE
MOBILITY SCORE: 22

## 2024-04-15 ASSESSMENT — PAIN SCALES - GENERAL
PAINLEVEL_OUTOF10: 0 - NO PAIN

## 2024-04-15 ASSESSMENT — PAIN - FUNCTIONAL ASSESSMENT
PAIN_FUNCTIONAL_ASSESSMENT: FLACC (FACE, LEGS, ACTIVITY, CRY, CONSOLABILITY)
PAIN_FUNCTIONAL_ASSESSMENT: 0-10

## 2024-04-15 NOTE — CARE PLAN
Problem: Respiratory  Goal: Minimize anxiety/maximize coping throughout shift  Outcome: Progressing  Goal: Verbalize decreased shortness of breath this shift  Outcome: Progressing  Goal: Wean oxygen to maintain O2 saturation per order/standard this shift  Outcome: Progressing

## 2024-04-15 NOTE — PROGRESS NOTES
Pt continues on IV steroids and abx.      04/15/24 1220   Discharge Planning   Patient expects to be discharged to: Home

## 2024-04-15 NOTE — DOCUMENTATION CLARIFICATION NOTE
"    PATIENT:               CLIFFORD CALLAHAN  ACCT #:                  [id number]  MRN:                       [id number]  :                       1955  ADMIT DATE:       2024 1:05 PM  DISCH DATE:  RESPONDING PROVIDER #:        [id number]          PROVIDER RESPONSE TEXT:    Bacterial PNA unspecified    CDI QUERY TEXT:    Clarification        Instruction:    Based on your assessment of the patient and the clinical information, please provide the requested documentation by clicking on the appropriate radio button and enter any additional information if prompted.    Question: Please further specify the type of pneumonia being treated    When answering this query, please exercise your independent professional judgment. The fact that a question is being asked, does not imply that any particular answer is desired or expected.    The patient's clinical indicators include:  Clinical Information:  68 y.o. male presenting with shortness of breath.    Clinical Indicators:    : Chest x ray: \"Impression: Patchy bilateral infiltrates. Consider multifocal pneumonia or edema. Follow-up is recommended.    WBC 24.2, ANC 21.4    : ED documentation: \"Acute hypoxic respiratory failure.  Pneumonia of both lungs due to infectious organism, unspecified part of lung\"    : HP: \"68 y.o. male presenting with shortness of breath.  Patient was seen in his primary care physician's office and sent to the emergency room.  He reported 2-week history of cough productive of tan sputum.  He also reports ongoing nicotine use.  He has been wheezing.  He reports compliance with his inhaler therapy\"    : Pulmonology consult: \"Acute respiratory failure with hypoxia: In the setting of pneumonia and airway disease,  COPD with acute exacerbation, Probable bacterial pneumonia\"    4/15: Pulmonology progress note: \"Acute hypoxic respiratory failure, Acute COPD exacerbation, Probable bacterial pneumonia\"    Treatment: IV antibiotics: " ceftriaxone, Azithromycin< IV steroids, Aerosol treatments, IV fluid resuscitation    Risk Factors: COPD, productive cough  Options provided:  -- Aspiration PNA  -- Gram Negative PNA  -- Bacterial PNA unspecified  -- Other - I will add my own diagnosis  -- Refer to Clinical Documentation Reviewer    Query created by: Geoff Cronin on 4/15/2024 11:16 AM      Electronically signed by:  HUBERT SAGASTUME MD 4/15/2024 1:26 PM

## 2024-04-15 NOTE — NURSING NOTE
Dr Haider in to see pt. Discussed POC with pt. He states hopefully tomorrow he can discharge, as pt is still on 2LNC, and has wheezing throughout R Lung. Pt agreeable and cooperative.

## 2024-04-15 NOTE — PROGRESS NOTES
"Aaron Shaw is a 68 y.o. male on day 4 of admission presenting seen in follow-up for acute hypoxic respiratory failure, acute COPD exacerbation, bacterial   Pneumonia    Subjective   On 2 L nasal cannula oxygen; O2 sats 93%.  \"My breathing is so-so.\"  Denies cough.  Denies pain.  Afebrile.       Objective     Physical Exam  Vitals and nursing note reviewed.   Constitutional:       Appearance: Normal appearance.   HENT:      Head: Normocephalic and atraumatic.      Nose: Nose normal.      Mouth/Throat:      Mouth: Mucous membranes are moist.   Eyes:      Extraocular Movements: Extraocular movements intact.      Conjunctiva/sclera: Conjunctivae normal.      Pupils: Pupils are equal, round, and reactive to light.   Cardiovascular:      Rate and Rhythm: Normal rate and regular rhythm.      Pulses: Normal pulses.      Heart sounds: Normal heart sounds.   Pulmonary:      Effort: Pulmonary effort is normal.      Breath sounds: Wheezing present.      Comments: Mild expiratory wheezes at bases bilaterally.  Abdominal:      General: Bowel sounds are normal.      Palpations: Abdomen is soft.   Musculoskeletal:         General: Normal range of motion.   Skin:     General: Skin is warm and dry.      Capillary Refill: Capillary refill takes less than 2 seconds.   Neurological:      General: No focal deficit present.      Mental Status: He is alert and oriented to person, place, and time.   Psychiatric:         Mood and Affect: Mood normal.         Behavior: Behavior normal.         Last Recorded Vitals  Blood pressure 157/75, pulse 73, temperature 36.1 °C (97 °F), temperature source Temporal, resp. rate 22, height 1.829 m (6'), weight 81 kg (178 lb 9.2 oz), SpO2 96%.  Intake/Output last 3 Shifts:  I/O last 3 completed shifts:  In: 200 (2.5 mL/kg) [P.O.:200]  Out: 2600 (32.1 mL/kg) [Urine:2600 (0.9 mL/kg/hr)]  Weight: 81 kg       Intake/Output Summary (Last 24 hours) at 4/15/2024 0910  Last data filed at 4/15/2024 0900  Gross " per 24 hour   Intake 560 ml   Output 1700 ml   Net -1140 ml      atorvastatin, 40 mg, oral, Nightly  azithromycin, 500 mg, intravenous, q24h  budesonide, 0.5 mg, nebulization, BID  cefTRIAXone, 2 g, intravenous, q24h  enoxaparin, 40 mg, subcutaneous, q24h  glipiZIDE, 5 mg, oral, BID AC  insulin glargine, 30 Units, subcutaneous, q24h  insulin lispro, 0-5 Units, subcutaneous, TID with meals  ipratropium-albuteroL, 3 mL, nebulization, TID  lisinopril, 5 mg, oral, Daily  metFORMIN, 1,000 mg, oral, BID with meals  methylPREDNISolone sodium succinate (PF), 30 mg, intravenous, q8h  pantoprazole, 20 mg, oral, Daily  polyethylene glycol, 17 g, oral, Daily  pregabalin, 300 mg, oral, BID  sodium chloride, 1,500 mL, intravenous, Once  tiotropium, 2 puff, inhalation, Daily       PRN medications: acetaminophen **OR** acetaminophen **OR** acetaminophen, albuterol, dextrose, dextrose, glucagon, glucagon       Relevant Results  Results for orders placed or performed during the hospital encounter of 04/11/24 (from the past 24 hour(s))   Lavender Top   Result Value Ref Range    Extra Tube Hold for add-ons.    Basic metabolic panel   Result Value Ref Range    Glucose 324 (H) 65 - 99 mg/dL    Sodium 133 133 - 145 mmol/L    Potassium 4.9 3.4 - 5.1 mmol/L    Chloride 94 (L) 97 - 107 mmol/L    Bicarbonate 28 24 - 31 mmol/L    Urea Nitrogen 28 (H) 8 - 25 mg/dL    Creatinine 0.60 0.40 - 1.60 mg/dL    eGFR >90 >60 mL/min/1.73m*2    Calcium 9.7 8.5 - 10.4 mg/dL    Anion Gap 11 <=19 mmol/L   POCT GLUCOSE   Result Value Ref Range    POCT Glucose 225 (H) 74 - 99 mg/dL   POCT GLUCOSE   Result Value Ref Range    POCT Glucose 229 (H) 74 - 99 mg/dL   POCT GLUCOSE   Result Value Ref Range    POCT Glucose 318 (H) 74 - 99 mg/dL   POCT GLUCOSE   Result Value Ref Range    POCT Glucose 312 (H) 74 - 99 mg/dL   CBC and Auto Differential   Result Value Ref Range    WBC 7.8 4.4 - 11.3 x10*3/uL    nRBC 0.0 0.0 - 0.0 /100 WBCs    RBC 4.02 (L) 4.50 - 5.90  x10*6/uL    Hemoglobin 12.1 (L) 13.5 - 17.5 g/dL    Hematocrit 36.9 (L) 41.0 - 52.0 %    MCV 92 80 - 100 fL    MCH 30.1 26.0 - 34.0 pg    MCHC 32.8 32.0 - 36.0 g/dL    RDW 13.0 11.5 - 14.5 %    Platelets 337 150 - 450 x10*3/uL    Neutrophils % 79.8 40.0 - 80.0 %    Immature Granulocytes %, Automated 4.6 (H) 0.0 - 0.9 %    Lymphocytes % 10.6 13.0 - 44.0 %    Monocytes % 4.9 2.0 - 10.0 %    Eosinophils % 0.0 0.0 - 6.0 %    Basophils % 0.1 0.0 - 2.0 %    Neutrophils Absolute 6.25 1.20 - 7.70 x10*3/uL    Immature Granulocytes Absolute, Automated 0.36 0.00 - 0.70 x10*3/uL    Lymphocytes Absolute 0.83 (L) 1.20 - 4.80 x10*3/uL    Monocytes Absolute 0.38 0.10 - 1.00 x10*3/uL    Eosinophils Absolute 0.00 0.00 - 0.70 x10*3/uL    Basophils Absolute 0.01 0.00 - 0.10 x10*3/uL   Basic Metabolic Panel   Result Value Ref Range    Glucose 293 (H) 65 - 99 mg/dL    Sodium 133 133 - 145 mmol/L    Potassium 5.8 (H) 3.4 - 5.1 mmol/L    Chloride 94 (L) 97 - 107 mmol/L    Bicarbonate 30 24 - 31 mmol/L    Urea Nitrogen 29 (H) 8 - 25 mg/dL    Creatinine 0.60 0.40 - 1.60 mg/dL    eGFR >90 >60 mL/min/1.73m*2    Calcium 9.9 8.5 - 10.4 mg/dL    Anion Gap 9 <=19 mmol/L   POCT GLUCOSE   Result Value Ref Range    POCT Glucose 291 (H) 74 - 99 mg/dL        XR chest 1 view  Result Date: 4/11/2024  FINDINGS:     AP portable view of the chest is obtained.  Limited exam due to portable nature. Magnified cardiac silhouette. New bilateral patchy infiltrates. No effusion or pneumothorax..     1.  Patchy bilateral infiltrates. Consider multifocal pneumonia or edema. Follow-up is recommended.           XR chest 2 views  Result Date: 4/5/2024  FINDINGS: Cardiomediastinal silhouette: Normal heart size. Trachea: Midline. Aorta: Atherosclerotic calcification. Lungs and pleura: Bi-apical pleural capping/pleural-pulmonary parenchymal scarring. Mild interstitial opacities in the right upper lung. Hyperinflation of the lungs. No pulmonary consolidation, pleural  effusion or pneumothorax. Osseous structures: Osteophytic endplate spurring and bridging of several thoracic vertebra. IMPRESSION: 1.  Bi-apical pleural capping/pleural-pulmonary parenchymal scarring. 2.  Mild interstitial opacities in the right upper lung which may be related to interstitial fibrosis versus atypical/viral pneumonia. 3.  Chronic obstructive pulmonary disease/emphysema.     Impression  Acute hypoxic respiratory failure  Acute COPD exacerbation  Probable bacterial pneumonia  Diabetes mellitus with hyperglycemia  Tobacco use    Plan  Wean oxygen as sats allow  Home O2 certification prior to discharge  Continue IBD/ICS  Incentive spirometry/pulmonary hygiene  IV Solu-Medrol-will maintain current dose  Continue IV azithromycin, ceftriaxone  FEV1 when optimized  Smoking cessation  Prophylaxis  PT/OT/out of bed      Cathy Dominguez, APRN-CNP  [facility]

## 2024-04-15 NOTE — CARE PLAN
The patient's goals for the shift include      The clinical goals for the shift include maintain SpO2 gretaer than 92%/decreased SOB with ambulation    Over the shift, the patient did not make progress toward the following goals. Barriers to progression include COPD/smoker. Recommendations to address these barriers include administer medication/interventions as ordered, encourage smoking cessation.

## 2024-04-16 ENCOUNTER — PHARMACY VISIT (OUTPATIENT)
Dept: PHARMACY | Facility: CLINIC | Age: 69
End: 2024-04-16
Payer: COMMERCIAL

## 2024-04-16 VITALS
BODY MASS INDEX: 24.49 KG/M2 | HEART RATE: 73 BPM | TEMPERATURE: 97.2 F | OXYGEN SATURATION: 95 % | SYSTOLIC BLOOD PRESSURE: 115 MMHG | HEIGHT: 72 IN | DIASTOLIC BLOOD PRESSURE: 66 MMHG | WEIGHT: 180.78 LBS | RESPIRATION RATE: 20 BRPM

## 2024-04-16 LAB
ANION GAP SERPL CALC-SCNC: 11 MMOL/L
BUN SERPL-MCNC: 26 MG/DL (ref 8–25)
CALCIUM SERPL-MCNC: 9.9 MG/DL (ref 8.5–10.4)
CHLORIDE SERPL-SCNC: 92 MMOL/L (ref 97–107)
CO2 SERPL-SCNC: 27 MMOL/L (ref 24–31)
CREAT SERPL-MCNC: 0.5 MG/DL (ref 0.4–1.6)
EGFRCR SERPLBLD CKD-EPI 2021: >90 ML/MIN/1.73M*2
GLUCOSE BLD MANUAL STRIP-MCNC: 136 MG/DL (ref 74–99)
GLUCOSE BLD MANUAL STRIP-MCNC: 166 MG/DL (ref 74–99)
GLUCOSE SERPL-MCNC: 196 MG/DL (ref 65–99)
POTASSIUM SERPL-SCNC: 5.3 MMOL/L (ref 3.4–5.1)
SODIUM SERPL-SCNC: 130 MMOL/L (ref 133–145)

## 2024-04-16 PROCEDURE — 2500000001 HC RX 250 WO HCPCS SELF ADMINISTERED DRUGS (ALT 637 FOR MEDICARE OP): Performed by: INTERNAL MEDICINE

## 2024-04-16 PROCEDURE — 80048 BASIC METABOLIC PNL TOTAL CA: CPT | Performed by: INTERNAL MEDICINE

## 2024-04-16 PROCEDURE — 2500000004 HC RX 250 GENERAL PHARMACY W/ HCPCS (ALT 636 FOR OP/ED): Performed by: NURSE PRACTITIONER

## 2024-04-16 PROCEDURE — 36415 COLL VENOUS BLD VENIPUNCTURE: CPT | Performed by: INTERNAL MEDICINE

## 2024-04-16 PROCEDURE — 82947 ASSAY GLUCOSE BLOOD QUANT: CPT

## 2024-04-16 PROCEDURE — 2500000002 HC RX 250 W HCPCS SELF ADMINISTERED DRUGS (ALT 637 FOR MEDICARE OP, ALT 636 FOR OP/ED): Performed by: INTERNAL MEDICINE

## 2024-04-16 PROCEDURE — 94640 AIRWAY INHALATION TREATMENT: CPT

## 2024-04-16 PROCEDURE — 2500000002 HC RX 250 W HCPCS SELF ADMINISTERED DRUGS (ALT 637 FOR MEDICARE OP, ALT 636 FOR OP/ED): Performed by: HOSPITALIST

## 2024-04-16 PROCEDURE — 2500000004 HC RX 250 GENERAL PHARMACY W/ HCPCS (ALT 636 FOR OP/ED): Performed by: HOSPITALIST

## 2024-04-16 PROCEDURE — 9420000001 HC RT PATIENT EDUCATION 5 MIN

## 2024-04-16 PROCEDURE — 2500000004 HC RX 250 GENERAL PHARMACY W/ HCPCS (ALT 636 FOR OP/ED): Performed by: INTERNAL MEDICINE

## 2024-04-16 PROCEDURE — 2500000006 HC RX 250 W HCPCS SELF ADMINISTERED DRUGS (ALT 637 FOR ALL PAYERS): Performed by: INTERNAL MEDICINE

## 2024-04-16 PROCEDURE — RXMED WILLOW AMBULATORY MEDICATION CHARGE

## 2024-04-16 RX ORDER — PREDNISONE 10 MG/1
TABLET ORAL
Qty: 35 TABLET | Refills: 0 | Status: SHIPPED | OUTPATIENT
Start: 2024-04-16 | End: 2024-05-16

## 2024-04-16 RX ORDER — AZITHROMYCIN 250 MG/1
250 TABLET, FILM COATED ORAL
Qty: 5 TABLET | Refills: 0 | Status: SHIPPED | OUTPATIENT
Start: 2024-04-16

## 2024-04-16 RX ORDER — AZITHROMYCIN 250 MG/1
250 TABLET, FILM COATED ORAL
Status: DISCONTINUED | OUTPATIENT
Start: 2024-04-16 | End: 2024-04-16 | Stop reason: HOSPADM

## 2024-04-16 RX ADMIN — INSULIN LISPRO 1 UNITS: 100 INJECTION, SOLUTION INTRAVENOUS; SUBCUTANEOUS at 08:50

## 2024-04-16 RX ADMIN — IPRATROPIUM BROMIDE AND ALBUTEROL SULFATE 3 ML: 2.5; .5 SOLUTION RESPIRATORY (INHALATION) at 07:12

## 2024-04-16 RX ADMIN — AZITHROMYCIN DIHYDRATE 250 MG: 250 TABLET ORAL at 15:25

## 2024-04-16 RX ADMIN — METHYLPREDNISOLONE SODIUM SUCCINATE 20 MG: 40 INJECTION, POWDER, FOR SOLUTION INTRAMUSCULAR; INTRAVENOUS at 15:26

## 2024-04-16 RX ADMIN — METHYLPREDNISOLONE SODIUM SUCCINATE 30 MG: 40 INJECTION, POWDER, FOR SOLUTION INTRAMUSCULAR; INTRAVENOUS at 00:26

## 2024-04-16 RX ADMIN — METHYLPREDNISOLONE SODIUM SUCCINATE 30 MG: 40 INJECTION, POWDER, FOR SOLUTION INTRAMUSCULAR; INTRAVENOUS at 08:01

## 2024-04-16 RX ADMIN — LISINOPRIL 5 MG: 5 TABLET ORAL at 08:46

## 2024-04-16 RX ADMIN — BUDESONIDE INHALATION 0.5 MG: 0.5 SUSPENSION RESPIRATORY (INHALATION) at 07:12

## 2024-04-16 RX ADMIN — GLIPIZIDE 5 MG: 5 TABLET ORAL at 08:46

## 2024-04-16 RX ADMIN — IPRATROPIUM BROMIDE AND ALBUTEROL SULFATE 3 ML: 2.5; .5 SOLUTION RESPIRATORY (INHALATION) at 12:04

## 2024-04-16 RX ADMIN — CEFTRIAXONE SODIUM 2 G: 2 INJECTION, SOLUTION INTRAVENOUS at 14:57

## 2024-04-16 RX ADMIN — ENOXAPARIN SODIUM 40 MG: 40 INJECTION SUBCUTANEOUS at 08:46

## 2024-04-16 RX ADMIN — PANTOPRAZOLE SODIUM 20 MG: 20 TABLET, DELAYED RELEASE ORAL at 08:46

## 2024-04-16 RX ADMIN — METFORMIN HYDROCHLORIDE 1000 MG: 1000 TABLET ORAL at 08:46

## 2024-04-16 RX ADMIN — PREGABALIN 300 MG: 150 CAPSULE ORAL at 08:46

## 2024-04-16 RX ADMIN — TIOTROPIUM BROMIDE INHALATION SPRAY 2 PUFF: 3.12 SPRAY, METERED RESPIRATORY (INHALATION) at 07:12

## 2024-04-16 ASSESSMENT — COGNITIVE AND FUNCTIONAL STATUS - GENERAL
DAILY ACTIVITIY SCORE: 24
MOBILITY SCORE: 24

## 2024-04-16 ASSESSMENT — PAIN SCALES - GENERAL
PAINLEVEL_OUTOF10: 0 - NO PAIN

## 2024-04-16 ASSESSMENT — PAIN - FUNCTIONAL ASSESSMENT
PAIN_FUNCTIONAL_ASSESSMENT: WONG-BAKER FACES
PAIN_FUNCTIONAL_ASSESSMENT: 0-10
PAIN_FUNCTIONAL_ASSESSMENT: WONG-BAKER FACES

## 2024-04-16 NOTE — CARE PLAN
The patient's goals for the shift include      The clinical goals for the shift include IV antibiotics, improve resp function.    Over the shift, the patient did not make progress toward the following goals. Barriers to progression include none. Recommendations to address these barriers include none.

## 2024-04-16 NOTE — NURSING NOTE
Plan to discharge home today.  Patient did not qualify for home O2.    IV ATB therapy given and oral azithromycin administered.    Awaiting son to .

## 2024-04-16 NOTE — CARE PLAN
RT acknowledged order for Home O2 Eval. SpO2 95% at rest on RA. SpO2 as low as 91% on RA with ambulation.

## 2024-04-16 NOTE — PROGRESS NOTES
Aaron Shaw is a 68 y.o. male on day 5 of admission presenting seen in follow-up for acute hypoxic respiratory failure, acute COPD exacerbation, bacterial   Pneumonia    Subjective   On 2 L nasal cannula oxygen; O2 sats 98%. Endorses improved breathing. Afebrile.        Objective     Physical Exam  Vitals and nursing note reviewed.   Constitutional:       Appearance: Normal appearance.   HENT:      Head: Normocephalic and atraumatic.      Nose: Nose normal.      Mouth/Throat:      Mouth: Mucous membranes are moist.   Eyes:      Extraocular Movements: Extraocular movements intact.      Conjunctiva/sclera: Conjunctivae normal.      Pupils: Pupils are equal, round, and reactive to light.   Cardiovascular:      Rate and Rhythm: Normal rate and regular rhythm.      Pulses: Normal pulses.      Heart sounds: Normal heart sounds.   Pulmonary:      Effort: Pulmonary effort is normal.      Comments: Lungs diminished but clear.  Abdominal:      General: Bowel sounds are normal.      Palpations: Abdomen is soft.   Musculoskeletal:         General: Normal range of motion.   Skin:     General: Skin is warm and dry.      Capillary Refill: Capillary refill takes less than 2 seconds.   Neurological:      General: No focal deficit present.      Mental Status: He is alert and oriented to person, place, and time.   Psychiatric:         Mood and Affect: Mood normal.         Behavior: Behavior normal.         Last Recorded Vitals  Blood pressure 136/72, pulse 73, temperature 36.1 °C (97 °F), temperature source Temporal, resp. rate 20, height 1.829 m (6'), weight 82 kg (180 lb 12.4 oz), SpO2 98%.  Intake/Output last 3 Shifts:  I/O last 3 completed shifts:  In: 560 (6.8 mL/kg) [P.O.:560]  Out: 4375 (53.4 mL/kg) [Urine:4375 (1.5 mL/kg/hr)]  Weight: 82 kg       Intake/Output Summary (Last 24 hours) at 4/16/2024 0905  Last data filed at 4/16/2024 0848  Gross per 24 hour   Intake 480 ml   Output 3300 ml   Net -2820 ml      atorvastatin, 40  mg, oral, Nightly  azithromycin, 500 mg, intravenous, q24h  budesonide, 0.5 mg, nebulization, BID  cefTRIAXone, 2 g, intravenous, q24h  enoxaparin, 40 mg, subcutaneous, q24h  glipiZIDE, 5 mg, oral, BID AC  insulin glargine, 30 Units, subcutaneous, q24h  insulin lispro, 0-5 Units, subcutaneous, TID with meals  ipratropium-albuteroL, 3 mL, nebulization, TID  lisinopril, 5 mg, oral, Daily  metFORMIN, 1,000 mg, oral, BID with meals  methylPREDNISolone sodium succinate (PF), 30 mg, intravenous, q8h  pantoprazole, 20 mg, oral, Daily  polyethylene glycol, 17 g, oral, Daily  pregabalin, 300 mg, oral, BID  sodium chloride, 1,500 mL, intravenous, Once  tiotropium, 2 puff, inhalation, Daily       PRN medications: acetaminophen **OR** acetaminophen **OR** acetaminophen, albuterol, dextrose, dextrose, glucagon, glucagon       Relevant Results  Results for orders placed or performed during the hospital encounter of 04/11/24 (from the past 24 hour(s))   POCT GLUCOSE   Result Value Ref Range    POCT Glucose 294 (H) 74 - 99 mg/dL   POCT GLUCOSE   Result Value Ref Range    POCT Glucose 292 (H) 74 - 99 mg/dL   POCT GLUCOSE   Result Value Ref Range    POCT Glucose 260 (H) 74 - 99 mg/dL   POCT GLUCOSE   Result Value Ref Range    POCT Glucose 166 (H) 74 - 99 mg/dL        XR chest 1 view  Result Date: 4/11/2024  FINDINGS:     AP portable view of the chest is obtained.  Limited exam due to portable nature. Magnified cardiac silhouette. New bilateral patchy infiltrates. No effusion or pneumothorax..     1.  Patchy bilateral infiltrates. Consider multifocal pneumonia or edema. Follow-up is recommended.           XR chest 2 views  Result Date: 4/5/2024  FINDINGS: Cardiomediastinal silhouette: Normal heart size. Trachea: Midline. Aorta: Atherosclerotic calcification. Lungs and pleura: Bi-apical pleural capping/pleural-pulmonary parenchymal scarring. Mild interstitial opacities in the right upper lung. Hyperinflation of the lungs. No pulmonary  consolidation, pleural effusion or pneumothorax. Osseous structures: Osteophytic endplate spurring and bridging of several thoracic vertebra. IMPRESSION: 1.  Bi-apical pleural capping/pleural-pulmonary parenchymal scarring. 2.  Mild interstitial opacities in the right upper lung which may be related to interstitial fibrosis versus atypical/viral pneumonia. 3.  Chronic obstructive pulmonary disease/emphysema.     Impression  Acute hypoxic respiratory failure  Acute COPD exacerbation  Probable bacterial pneumonia  Diabetes mellitus with hyperglycemia  Tobacco use    Plan  Wean oxygen as sats allow  Home O2 certification prior to discharge  Continue IBD/ICS  Incentive spirometry/pulmonary hygiene  IV Solu-Medrol-will decrease dose and discharge on oral taper  Continue IV azithromycin, ceftriaxone-discharge on oral  FEV1 when optimized or as outpatient  Smoking cessation  Prophylaxis  PT/OT/out of bed  Stable for discharge from a pulmonary standpoint after home O2 certification  Follow-up with Dr. Freitas in 2 weeks chest x-ray      MIRLANDE [facility]

## 2024-04-16 NOTE — PROGRESS NOTES
Pt may need home 02 cert.      04/16/24 1219   Discharge Planning   Patient expects to be discharged to: Home

## 2024-04-16 NOTE — NURSING NOTE
Patient ate breakfast.  Up ambulating to bathroom.  Declines miralax at this time and wants to try to have BM without medication.

## 2024-04-16 NOTE — DOCUMENTATION CLARIFICATION NOTE
"    PATIENT:               CLIFFORD CALLAHAN  ACCT #:                  [id number]  MRN:                       [id number]  :                       1955  ADMIT DATE:       2024 1:05 PM  DISCH DATE:  RESPONDING PROVIDER #:        [id number]          PROVIDER RESPONSE TEXT:    Sepsis with organ dysfunction Acute respiratory failure    CDI QUERY TEXT:    Clarification        Instruction:  Based on your assessment of the patient and the clinical information, please provide the requested documentation by clicking on the appropriate radio button and enter any additional information if prompted.    Question: Is there a diagnosis indicative of a patient meeting SIRS criteria and with organ dysfunction in the setting of CDI TO ENTER infection    When answering this query, please exercise your independent professional judgment. The fact that a question is being asked, does not imply that any particular answer is desired or expected.    The patient's clinical indicators include:  Clinical Information: Patient is a 68-year-old male presenting to ER from his primary care office for evaluation of shortness of breath and low oxygen saturation on room air.    Clinical Indicators:    : WBC 24.2, ANC 21.4     Vital signs: 36.8, 110 hr, 96/54, 20    : ED documentation: \"68-year-old male referred to ER by primary care office for low oxygen saturation.  On arrival patient was tachycardic and hypotensive.  Patient was also hypoxic at 85 to 87% on room air thus he was placed on 2 L with improvement.  Lungs were wheezy and rhonchorous bilaterally.  Sepsis order set was initiated and it IV ceftriaxone and azithromycin were administered for suspicion of pneumonia.\"    : ED documentation: \"Critical care was necessary to treat or prevent imminent or life-threatening deterioration of the following conditions:  Sepsis and respiratory failure\"    : HP: \"COPD exacerbation -aerosol treatments, steroids, antibiotics, " "improving.  Secondary to community-acquired pneumonia, serial x-rays.\"    Treatment: IV antibiotics, IV fluid resuscitation    Risk Factors: HX PNA, COPD  Options provided:  -- Sepsis with organ dysfunction Acute respiratory failure  -- Patient treated for PNA without sepsis  -- Other - I will add my own diagnosis  -- Refer to Clinical Documentation Reviewer    Query created by: Geoff Cronin on 4/15/2024 11:34 AM      Electronically signed by:  REBEKAH BHATT MD 4/15/2024 8:01 PM          "

## 2024-04-16 NOTE — NURSING NOTE
DC completed. IV and tele removed. Meds delivered and are bedside.Pt is waiting for son to pick him up and he is enroute.

## 2024-04-16 NOTE — NURSING NOTE
Assumed care of patient.    Awake and alert.  Respirations even and unlabored on 2L O2 via n/c.  Breathing tx being administered.  No complaints of pain/discomfort.  Call light in reach.  Bed locked and in low position.

## 2024-04-16 NOTE — PROGRESS NOTES
Aaron Shaw is a 68 y.o. male on day 5 of admission presenting with Acute hypoxic respiratory failure (Multi).      Subjective     Doing better, weaning down on oxygen.  Still has some wheezing.     Objective     Last Recorded Vitals  /72 (BP Location: Right arm, Patient Position: Lying)   Pulse 73   Temp 36.1 °C (97 °F) (Temporal)   Resp 20   Wt 82 kg (180 lb 12.4 oz)   SpO2 98%   Intake/Output last 3 Shifts:    Intake/Output Summary (Last 24 hours) at 4/16/2024 0942  Last data filed at 4/16/2024 0848  Gross per 24 hour   Intake 480 ml   Output 3300 ml   Net -2820 ml       Admission Weight  Weight: 79.8 kg (175 lb 14.8 oz) (04/11/24 1311)    Daily Weight  04/16/24 : 82 kg (180 lb 12.4 oz)    Image Results  ECG 12 lead  Sinus tachycardia  ST elevation, consider inferior injury or acute infarct vs repolarization changes  Abnormal ECG    Abnormal ECG  When compared with ECG of 15-APR-2015 01:01,  T wave inversion now evident in Anterior leads  Confirmed by Jeovanny Schwartz ([id number]) on 4/11/2024 7:52:08 PM  XR chest 1 view  Narrative: Interpreted By:  Jus Martinez,   STUDY:  XR CHEST 1 VIEW;  4/11/2024 2:21 pm      INDICATION:  Signs/Symptoms:Chest Pain.      COMPARISON:  07/19/2020      ACCESSION NUMBER(S):  [id number]      ORDERING CLINICIAN:  ROSANNA POPE      FINDINGS:          AP portable view of the chest is obtained.  Limited exam due to  portable nature. Magnified cardiac silhouette. New bilateral patchy  infiltrates. No effusion or pneumothorax..      Impression: 1.  Patchy bilateral infiltrates. Consider multifocal pneumonia or  edema. Follow-up is recommended.              MACRO:  None      Signed by: Jus Martinez 4/11/2024 2:34 PM  Dictation workstation:   [id number]    Physical Exam        Constitutional:       Appearance: Middle-aged, well-built, in no distress  HENT:      Head: Normocephalic and atraumatic.   Eyes:      Extraocular Movements: Extraocular movements intact.      Pupils:  Pupils are equal, round, and reactive to light.   Cardiovascular:      Rate and Rhythm: Normal rate and regular rhythm.      Pulses: Normal pulses.      Heart sounds: Normal heart sounds.   Pulmonary:      Effort: Pulmonary effort is normal.      Breath sounds: Minimal wheeze few rhonchi in bases.   Abdominal:      General: Abdomen is flat. Bowel sounds are normal.      Palpations: Abdomen is soft.   Musculoskeletal:         General: Normal range of motion.      Cervical back: Normal range of motion and neck supple.   Skin:     General: Skin is warm and dry.   Neurological:      General: No focal deficit present.      Mental Status: He is alert and oriented to person, place, and time. Mental status is at baseline.      Assessment/Plan      COPD exacerbation -improving slowly, continue with steroids.  Diabetes type 2 -BS still borderline, continue with steroids.  Nicotine abuse -cessation of use encouraged.  Back pain -improved with analgesics.     Discharge planning       Darryl Haider MD

## 2024-04-18 NOTE — DISCHARGE SUMMARY
Discharge Diagnosis  Acute hypoxic respiratory failure (Multi)    Issues Requiring Follow-Up      Discharge Meds     Your medication list        START taking these medications        Instructions Last Dose Given Next Dose Due   azithromycin 250 mg tablet  Commonly known as: Zithromax      Take 1 tablet (250 mg) by mouth once every 24 hours.       predniSONE 10 mg tablet  Commonly known as: Deltasone      Take 2 tablets by mouth twice daily x 5 days, 2 tablets once daily x 5 days, then 1 tablet daily x 5 days.              CONTINUE taking these medications        Instructions Last Dose Given Next Dose Due   ascorbic acid 500 mg tablet  Commonly known as: Vitamin C           atorvastatin 40 mg tablet  Commonly known as: Lipitor           fluticasone 50 mcg/actuation nasal spray  Commonly known as: Flonase           glipiZIDE 5 mg tablet  Commonly known as: Glucotrol           lisinopril 5 mg tablet           magnesium lactate CR 84 mg ER tablet  Commonly known as: Magtab           metFORMIN 1,000 mg tablet  Commonly known as: Glucophage           pantoprazole 20 mg EC tablet  Commonly known as: ProtoNix           pregabalin 300 mg capsule  Commonly known as: Lyrica           pregabalin 300 mg capsule  Commonly known as: Lyrica           tadalafil 20 mg tablet  Commonly known as: Cialis           vitamin E 180 mg (400 unit) capsule                     Where to Get Your Medications        These medications were sent to [facility]  [id number] [address] [id number]      Hours: 9 AM to 6 PM Mon-Fri, 9 AM to 1 PM Sat Phone: [phone]   azithromycin 250 mg tablet  predniSONE 10 mg tablet         Test Results Pending At Discharge  Pending Labs       No current pending labs.            Hospital Course   Aaron Shaw is a 68 y.o. male presenting with shortness of breath.  Patient was seen in his primary care physician's office and sent to the emergency room.  He reported 2-week history of cough  productive of tan sputum.  He also reports ongoing nicotine use.  He has been wheezing.  He reports compliance with his inhaler therapy.  He also reports chronic low back pain which has been worse recently.  He has a history of sciatica.  He was evaluated in the ER and found to have evidence of fluid overload as well as COPD exacerbation.  He received steroids, aerosol treatments, diuretics, and antibiotics with improvement.  He was admitted to stepdown unit.    The patient was treated for a COPD exacerbation, was seen by pulm, s/s improved. He was stable for dc, recommended close outpatient follow up.        Pertinent Physical Exam At Time of Discharge      Outpatient Follow-Up  No future appointments.    Time and care for discharge management > 30 minutes.     Cathryn Paiz, APRN-CNP

## 2024-11-25 ENCOUNTER — APPOINTMENT (OUTPATIENT)
Dept: RADIOLOGY | Facility: HOSPITAL | Age: 69
End: 2024-11-25
Payer: OTHER GOVERNMENT

## 2024-11-25 ENCOUNTER — APPOINTMENT (OUTPATIENT)
Dept: CARDIOLOGY | Facility: HOSPITAL | Age: 69
End: 2024-11-25
Payer: OTHER GOVERNMENT

## 2024-11-25 ENCOUNTER — HOSPITAL ENCOUNTER (INPATIENT)
Facility: HOSPITAL | Age: 69
LOS: 4 days | Discharge: HOME | End: 2024-11-29
Attending: EMERGENCY MEDICINE | Admitting: INTERNAL MEDICINE
Payer: OTHER GOVERNMENT

## 2024-11-25 DIAGNOSIS — J96.01 ACUTE HYPOXIC RESPIRATORY FAILURE (MULTI): ICD-10-CM

## 2024-11-25 DIAGNOSIS — E83.42 HYPOMAGNESEMIA: ICD-10-CM

## 2024-11-25 DIAGNOSIS — J43.9 PULMONARY EMPHYSEMA, UNSPECIFIED EMPHYSEMA TYPE (MULTI): ICD-10-CM

## 2024-11-25 DIAGNOSIS — J44.1 COPD WITH ACUTE EXACERBATION (MULTI): Primary | ICD-10-CM

## 2024-11-25 DIAGNOSIS — R41.82 ALTERED MENTAL STATUS, UNSPECIFIED ALTERED MENTAL STATUS TYPE: ICD-10-CM

## 2024-11-25 PROBLEM — E11.59 TYPE 2 DIABETES MELLITUS WITH CIRCULATORY DISORDER, WITHOUT LONG-TERM CURRENT USE OF INSULIN: Status: ACTIVE | Noted: 2024-11-25

## 2024-11-25 PROBLEM — R11.0 NAUSEA: Status: ACTIVE | Noted: 2024-11-25

## 2024-11-25 PROBLEM — Z72.0 TOBACCO ABUSE: Status: ACTIVE | Noted: 2024-11-25

## 2024-11-25 PROBLEM — R10.9 ABDOMINAL PAIN: Status: ACTIVE | Noted: 2024-11-25

## 2024-11-25 PROBLEM — I10 HYPERTENSION: Status: ACTIVE | Noted: 2024-11-25

## 2024-11-25 PROBLEM — R53.1 WEAKNESS: Status: ACTIVE | Noted: 2024-11-25

## 2024-11-25 LAB
ALBUMIN SERPL BCP-MCNC: 4.2 G/DL (ref 3.4–5)
ALP SERPL-CCNC: 71 U/L (ref 33–136)
ALT SERPL W P-5'-P-CCNC: 6 U/L (ref 10–52)
ANION GAP BLDV CALCULATED.4IONS-SCNC: 13 MMOL/L (ref 10–25)
ANION GAP SERPL CALCULATED.3IONS-SCNC: 14 MMOL/L (ref 10–20)
APPEARANCE UR: CLEAR
AST SERPL W P-5'-P-CCNC: 9 U/L (ref 9–39)
BACTERIA #/AREA URNS AUTO: ABNORMAL /HPF
BASE EXCESS BLDV CALC-SCNC: 3.1 MMOL/L (ref -2–3)
BASOPHILS # BLD AUTO: 0.02 X10*3/UL (ref 0–0.1)
BASOPHILS NFR BLD AUTO: 0.3 %
BILIRUB SERPL-MCNC: 0.9 MG/DL (ref 0–1.2)
BILIRUB UR STRIP.AUTO-MCNC: NEGATIVE MG/DL
BODY TEMPERATURE: 37 DEGREES CELSIUS
BUN SERPL-MCNC: 14 MG/DL (ref 6–23)
CA-I BLDV-SCNC: 1.3 MMOL/L (ref 1.1–1.33)
CALCIUM SERPL-MCNC: 10.2 MG/DL (ref 8.6–10.3)
CARDIAC TROPONIN I PNL SERPL HS: 3 NG/L (ref 0–20)
CARDIAC TROPONIN I PNL SERPL HS: 5 NG/L (ref 0–20)
CHLORIDE BLDV-SCNC: 96 MMOL/L (ref 98–107)
CHLORIDE SERPL-SCNC: 98 MMOL/L (ref 98–107)
CO2 SERPL-SCNC: 26 MMOL/L (ref 21–32)
COLOR UR: ABNORMAL
CREAT SERPL-MCNC: 0.64 MG/DL (ref 0.5–1.3)
EGFRCR SERPLBLD CKD-EPI 2021: >90 ML/MIN/1.73M*2
EOSINOPHIL # BLD AUTO: 0 X10*3/UL (ref 0–0.4)
EOSINOPHIL NFR BLD AUTO: 0 %
ERYTHROCYTE [DISTWIDTH] IN BLOOD BY AUTOMATED COUNT: 13 % (ref 11.5–14.5)
GLUCOSE BLD MANUAL STRIP-MCNC: 273 MG/DL (ref 74–99)
GLUCOSE BLDV-MCNC: 281 MG/DL (ref 74–99)
GLUCOSE SERPL-MCNC: 252 MG/DL (ref 74–99)
GLUCOSE UR STRIP.AUTO-MCNC: ABNORMAL MG/DL
HCO3 BLDV-SCNC: 27.9 MMOL/L (ref 22–26)
HCT VFR BLD AUTO: 44.2 % (ref 41–52)
HCT VFR BLD EST: 46 % (ref 41–52)
HGB BLD-MCNC: 15.3 G/DL (ref 13.5–17.5)
HGB BLDV-MCNC: 15.2 G/DL (ref 13.5–17.5)
IMM GRANULOCYTES # BLD AUTO: 0.02 X10*3/UL (ref 0–0.5)
IMM GRANULOCYTES NFR BLD AUTO: 0.3 % (ref 0–0.9)
INHALED O2 CONCENTRATION: 93 %
KETONES UR STRIP.AUTO-MCNC: ABNORMAL MG/DL
LACTATE BLDV-SCNC: 1.2 MMOL/L (ref 0.4–2)
LACTATE SERPL-SCNC: 1 MMOL/L (ref 0.4–2)
LEUKOCYTE ESTERASE UR QL STRIP.AUTO: NEGATIVE
LIPASE SERPL-CCNC: 5 U/L (ref 9–82)
LYMPHOCYTES # BLD AUTO: 1.01 X10*3/UL (ref 0.8–3)
LYMPHOCYTES NFR BLD AUTO: 13.3 %
MAGNESIUM SERPL-MCNC: 1.27 MG/DL (ref 1.6–2.4)
MCH RBC QN AUTO: 31.7 PG (ref 26–34)
MCHC RBC AUTO-ENTMCNC: 34.6 G/DL (ref 32–36)
MCV RBC AUTO: 92 FL (ref 80–100)
MONOCYTES # BLD AUTO: 0.41 X10*3/UL (ref 0.05–0.8)
MONOCYTES NFR BLD AUTO: 5.4 %
NEUTROPHILS # BLD AUTO: 6.13 X10*3/UL (ref 1.6–5.5)
NEUTROPHILS NFR BLD AUTO: 80.7 %
NITRITE UR QL STRIP.AUTO: NEGATIVE
NRBC BLD-RTO: 0 /100 WBCS (ref 0–0)
OXYHGB MFR BLDV: 75.6 % (ref 45–75)
PCO2 BLDV: 42 MM HG (ref 41–51)
PH BLDV: 7.43 PH (ref 7.33–7.43)
PH UR STRIP.AUTO: 6 [PH]
PLATELET # BLD AUTO: 243 X10*3/UL (ref 150–450)
PO2 BLDV: 44 MM HG (ref 35–45)
POTASSIUM BLDV-SCNC: 4.3 MMOL/L (ref 3.5–5.3)
POTASSIUM SERPL-SCNC: 4.3 MMOL/L (ref 3.5–5.3)
PROT SERPL-MCNC: 7.1 G/DL (ref 6.4–8.2)
PROT UR STRIP.AUTO-MCNC: ABNORMAL MG/DL
RBC # BLD AUTO: 4.83 X10*6/UL (ref 4.5–5.9)
RBC # UR STRIP.AUTO: NEGATIVE /UL
RBC #/AREA URNS AUTO: ABNORMAL /HPF
SAO2 % BLDV: 79 % (ref 45–75)
SODIUM BLDV-SCNC: 133 MMOL/L (ref 136–145)
SODIUM SERPL-SCNC: 134 MMOL/L (ref 136–145)
SP GR UR STRIP.AUTO: >1.05
SQUAMOUS #/AREA URNS AUTO: ABNORMAL /HPF
UROBILINOGEN UR STRIP.AUTO-MCNC: ABNORMAL MG/DL
WBC # BLD AUTO: 7.6 X10*3/UL (ref 4.4–11.3)
WBC #/AREA URNS AUTO: ABNORMAL /HPF

## 2024-11-25 PROCEDURE — 99285 EMERGENCY DEPT VISIT HI MDM: CPT | Mod: 25

## 2024-11-25 PROCEDURE — 36415 COLL VENOUS BLD VENIPUNCTURE: CPT | Performed by: PHYSICIAN ASSISTANT

## 2024-11-25 PROCEDURE — 1200000002 HC GENERAL ROOM WITH TELEMETRY DAILY

## 2024-11-25 PROCEDURE — 2500000004 HC RX 250 GENERAL PHARMACY W/ HCPCS (ALT 636 FOR OP/ED): Performed by: INTERNAL MEDICINE

## 2024-11-25 PROCEDURE — 85025 COMPLETE CBC W/AUTO DIFF WBC: CPT | Performed by: PHYSICIAN ASSISTANT

## 2024-11-25 PROCEDURE — 83690 ASSAY OF LIPASE: CPT | Performed by: PHYSICIAN ASSISTANT

## 2024-11-25 PROCEDURE — 82947 ASSAY GLUCOSE BLOOD QUANT: CPT

## 2024-11-25 PROCEDURE — 74177 CT ABD & PELVIS W/CONTRAST: CPT

## 2024-11-25 PROCEDURE — 93005 ELECTROCARDIOGRAM TRACING: CPT

## 2024-11-25 PROCEDURE — 96375 TX/PRO/DX INJ NEW DRUG ADDON: CPT

## 2024-11-25 PROCEDURE — 2500000004 HC RX 250 GENERAL PHARMACY W/ HCPCS (ALT 636 FOR OP/ED): Performed by: NURSE PRACTITIONER

## 2024-11-25 PROCEDURE — 84132 ASSAY OF SERUM POTASSIUM: CPT | Performed by: PHYSICIAN ASSISTANT

## 2024-11-25 PROCEDURE — 96366 THER/PROPH/DIAG IV INF ADDON: CPT

## 2024-11-25 PROCEDURE — 84484 ASSAY OF TROPONIN QUANT: CPT | Performed by: PHYSICIAN ASSISTANT

## 2024-11-25 PROCEDURE — 83735 ASSAY OF MAGNESIUM: CPT | Performed by: PHYSICIAN ASSISTANT

## 2024-11-25 PROCEDURE — 2500000004 HC RX 250 GENERAL PHARMACY W/ HCPCS (ALT 636 FOR OP/ED): Performed by: EMERGENCY MEDICINE

## 2024-11-25 PROCEDURE — 70450 CT HEAD/BRAIN W/O DYE: CPT

## 2024-11-25 PROCEDURE — 71045 X-RAY EXAM CHEST 1 VIEW: CPT

## 2024-11-25 PROCEDURE — 2500000004 HC RX 250 GENERAL PHARMACY W/ HCPCS (ALT 636 FOR OP/ED): Performed by: PHYSICIAN ASSISTANT

## 2024-11-25 PROCEDURE — 99223 1ST HOSP IP/OBS HIGH 75: CPT | Performed by: NURSE PRACTITIONER

## 2024-11-25 PROCEDURE — 81001 URINALYSIS AUTO W/SCOPE: CPT | Performed by: PHYSICIAN ASSISTANT

## 2024-11-25 PROCEDURE — 70450 CT HEAD/BRAIN W/O DYE: CPT | Performed by: RADIOLOGY

## 2024-11-25 PROCEDURE — 83605 ASSAY OF LACTIC ACID: CPT | Performed by: PHYSICIAN ASSISTANT

## 2024-11-25 PROCEDURE — 2550000001 HC RX 255 CONTRASTS: Performed by: EMERGENCY MEDICINE

## 2024-11-25 PROCEDURE — 71045 X-RAY EXAM CHEST 1 VIEW: CPT | Performed by: RADIOLOGY

## 2024-11-25 PROCEDURE — 96365 THER/PROPH/DIAG IV INF INIT: CPT

## 2024-11-25 PROCEDURE — 93010 ELECTROCARDIOGRAM REPORT: CPT | Performed by: INTERNAL MEDICINE

## 2024-11-25 PROCEDURE — 74177 CT ABD & PELVIS W/CONTRAST: CPT | Mod: FOREIGN READ | Performed by: RADIOLOGY

## 2024-11-25 RX ORDER — INSULIN LISPRO 100 [IU]/ML
0-5 INJECTION, SOLUTION INTRAVENOUS; SUBCUTANEOUS EVERY 4 HOURS
Status: DISCONTINUED | OUTPATIENT
Start: 2024-11-26 | End: 2024-11-28

## 2024-11-25 RX ORDER — ENOXAPARIN SODIUM 100 MG/ML
40 INJECTION SUBCUTANEOUS DAILY
Status: DISCONTINUED | OUTPATIENT
Start: 2024-11-26 | End: 2024-11-29 | Stop reason: HOSPADM

## 2024-11-25 RX ORDER — ONDANSETRON 4 MG/1
4 TABLET, FILM COATED ORAL EVERY 8 HOURS PRN
Status: DISCONTINUED | OUTPATIENT
Start: 2024-11-25 | End: 2024-11-29 | Stop reason: HOSPADM

## 2024-11-25 RX ORDER — PREGABALIN 150 MG/1
300 CAPSULE ORAL 2 TIMES DAILY
Status: DISCONTINUED | OUTPATIENT
Start: 2024-11-25 | End: 2024-11-29 | Stop reason: HOSPADM

## 2024-11-25 RX ORDER — FAMOTIDINE 10 MG/ML
20 INJECTION INTRAVENOUS ONCE
Status: COMPLETED | OUTPATIENT
Start: 2024-11-25 | End: 2024-11-25

## 2024-11-25 RX ORDER — ONDANSETRON HYDROCHLORIDE 2 MG/ML
4 INJECTION, SOLUTION INTRAVENOUS ONCE
Status: COMPLETED | OUTPATIENT
Start: 2024-11-25 | End: 2024-11-25

## 2024-11-25 RX ORDER — ACETAMINOPHEN 650 MG/1
650 SUPPOSITORY RECTAL EVERY 4 HOURS PRN
Status: DISCONTINUED | OUTPATIENT
Start: 2024-11-25 | End: 2024-11-29 | Stop reason: HOSPADM

## 2024-11-25 RX ORDER — MAGNESIUM SULFATE HEPTAHYDRATE 40 MG/ML
2 INJECTION, SOLUTION INTRAVENOUS ONCE
Status: COMPLETED | OUTPATIENT
Start: 2024-11-25 | End: 2024-11-26

## 2024-11-25 RX ORDER — MAGNESIUM SULFATE HEPTAHYDRATE 40 MG/ML
2 INJECTION, SOLUTION INTRAVENOUS ONCE
Status: COMPLETED | OUTPATIENT
Start: 2024-11-25 | End: 2024-11-25

## 2024-11-25 RX ORDER — LISINOPRIL 5 MG/1
5 TABLET ORAL DAILY
Status: DISCONTINUED | OUTPATIENT
Start: 2024-11-26 | End: 2024-11-29 | Stop reason: HOSPADM

## 2024-11-25 RX ORDER — IBUPROFEN 200 MG
1 TABLET ORAL DAILY
Status: DISCONTINUED | OUTPATIENT
Start: 2024-11-26 | End: 2024-11-29 | Stop reason: HOSPADM

## 2024-11-25 RX ORDER — PANTOPRAZOLE SODIUM 40 MG/10ML
20 INJECTION, POWDER, LYOPHILIZED, FOR SOLUTION INTRAVENOUS DAILY
Status: DISCONTINUED | OUTPATIENT
Start: 2024-11-26 | End: 2024-11-29 | Stop reason: HOSPADM

## 2024-11-25 RX ORDER — ONDANSETRON HYDROCHLORIDE 2 MG/ML
4 INJECTION, SOLUTION INTRAVENOUS EVERY 8 HOURS PRN
Status: DISCONTINUED | OUTPATIENT
Start: 2024-11-25 | End: 2024-11-29 | Stop reason: HOSPADM

## 2024-11-25 RX ORDER — MORPHINE SULFATE 4 MG/ML
4 INJECTION, SOLUTION INTRAMUSCULAR; INTRAVENOUS ONCE
Status: COMPLETED | OUTPATIENT
Start: 2024-11-25 | End: 2024-11-25

## 2024-11-25 RX ORDER — ACETAMINOPHEN 325 MG/1
650 TABLET ORAL EVERY 4 HOURS PRN
Status: DISCONTINUED | OUTPATIENT
Start: 2024-11-25 | End: 2024-11-29 | Stop reason: HOSPADM

## 2024-11-25 RX ORDER — ACETAMINOPHEN 160 MG/5ML
650 SOLUTION ORAL EVERY 4 HOURS PRN
Status: DISCONTINUED | OUTPATIENT
Start: 2024-11-25 | End: 2024-11-29 | Stop reason: HOSPADM

## 2024-11-25 RX ORDER — MORPHINE SULFATE 2 MG/ML
1 INJECTION, SOLUTION INTRAMUSCULAR; INTRAVENOUS
Status: DISCONTINUED | OUTPATIENT
Start: 2024-11-25 | End: 2024-11-29 | Stop reason: HOSPADM

## 2024-11-25 RX ORDER — SODIUM CHLORIDE 9 MG/ML
75 INJECTION, SOLUTION INTRAVENOUS CONTINUOUS
Status: ACTIVE | OUTPATIENT
Start: 2024-11-25 | End: 2024-11-27

## 2024-11-25 ASSESSMENT — ENCOUNTER SYMPTOMS
ACTIVITY CHANGE: 1
NEUROLOGICAL NEGATIVE: 1
FATIGUE: 1
HEMATOLOGIC/LYMPHATIC NEGATIVE: 1
APPETITE CHANGE: 1
ENDOCRINE NEGATIVE: 1
EYES NEGATIVE: 1
CARDIOVASCULAR NEGATIVE: 1
PSYCHIATRIC NEGATIVE: 1
RESPIRATORY NEGATIVE: 1
ABDOMINAL PAIN: 1

## 2024-11-25 ASSESSMENT — COLUMBIA-SUICIDE SEVERITY RATING SCALE - C-SSRS
2. HAVE YOU ACTUALLY HAD ANY THOUGHTS OF KILLING YOURSELF?: NO
6. HAVE YOU EVER DONE ANYTHING, STARTED TO DO ANYTHING, OR PREPARED TO DO ANYTHING TO END YOUR LIFE?: NO
1. IN THE PAST MONTH, HAVE YOU WISHED YOU WERE DEAD OR WISHED YOU COULD GO TO SLEEP AND NOT WAKE UP?: NO

## 2024-11-25 ASSESSMENT — PAIN DESCRIPTION - LOCATION
LOCATION: ABDOMEN
LOCATION: ABDOMEN

## 2024-11-25 ASSESSMENT — PAIN SCALES - GENERAL
PAINLEVEL_OUTOF10: 5 - MODERATE PAIN
PAINLEVEL_OUTOF10: 9

## 2024-11-25 ASSESSMENT — PAIN DESCRIPTION - PAIN TYPE: TYPE: ACUTE PAIN

## 2024-11-25 ASSESSMENT — PAIN - FUNCTIONAL ASSESSMENT: PAIN_FUNCTIONAL_ASSESSMENT: 0-10

## 2024-11-25 NOTE — PROGRESS NOTES
Attestation/Supervisory note for [facility] Crabtree      The patient is a 69-year-old male presenting to the emergency department for evaluation of malaise and fatigue.  The patient's son felt that the patient was somewhat confused and not answering questions appropriately when he went to check on him today.  The patient reportedly was last known well yesterday morning per the son.  The patient denies having any confusion.  He denies any headache or visual changes.  He denies any chest pain.  He states that he does not have any fever or chills.  He does have chronic shortness of breath and a nonproductive cough from his COPD.  He states that he has been primarily having some nausea without vomiting and diffuse abdominal pain over the past day or 2.  He states he just does not feel like he has any energy and does not have an appetite.  He states that the pain is a constant dull aching pain.  No better or worse.  No radiation.  He denies any sick contacts or recent travel.  He denies any urinary complaints.  All pertinent positives and negatives are recorded above.  All other systems reviewed and otherwise negative.  Vital signs with hypertension but otherwise within normal limits.  Physical exam with a well-nourished well-developed male with disheveled appearance but no evidence of acute distress.  HEENT exam with dry mucous membranes but otherwise within normal limits.  He does have some dyspnea when speaking in complete sentences but has no other evidence of airway compromise or respiratory distress.  He has diffuse abdominal tenderness to palpation.  No rebound or guarding.  No palpable masses.  No flank pain with percussion or palpation.  He does have equal pulses bilaterally.  He does not have any focal midline neck or back pain with palpation.  Pulses are equal bilaterally.  NIH stroke scale score of 0.      tPA/TNK is not indicated given last known well time more than 24 hours prior to arrival and an NIH stroke scale  score of 0.      EKG with normal sinus rhythm at 63 bpm, normal axis, normal voltage, normal ST segment, normal T waves      Diagnostic labs with mild hyperglycemia and mild electrolyte imbalance but otherwise unremarkable.      Initial troponin 3.  Repeat trop 5      Urinalysis was pending at the time of admission      CT abdomen pelvis w IV contrast   Final Result   Mild wall thickening of the descending sigmoid colon suggesting   component of colitis.  Scattered diverticula.   Mild wall thickening of the stomach suggesting gastritis.   Nonobstructive bilateral renal calculi with 6 mm calculus in the right   renal pelvis.   Indeterminate bilateral adrenal nodules.  Findings would better   evaluated with MRI or dynamic contrast enhanced CT.  Findings appear   stable.   Trace right pleural fluid.    Significant degenerative changes at L5-S1.   Signed by Puma Kim,       CT head wo IV contrast   Final Result   No acute intracranial pathology.        MACRO:   None        Signed by: Мария Linder 11/25/2024 3:49 PM   Dictation workstation:   [id number]      XR chest 1 view   Final Result   An area of subpleural nodularity in the right upper lobe is similar   to the prior exam and may represent scarring. Short-term follow-up or   CT of the chest as clinically warranted.        MACRO:   None        Signed by: Jus Martinez 11/25/2024 2:32 PM   Dictation workstation:   [id number]           The patient does not have any evidence of ischemia on EKG or cardiac enzymes.  No events on telemetry.  He does not have any significant lab abnormality but the results of the urinalysis were pending at the time of admission.  Chest x-ray without evidence of pneumonia or pneumothorax.  No evidence of CHF.  No widening of the mediastinum.  His pulses are equal bilaterally.  CT head shows no evidence of acute process.  No evidence of intracranial hemorrhage, mass effect or CVA.  The patient has an NIH stroke scale score of  0 making tPA/TNK not indicated.  CT abdomen pelvis shows mild wall thickening of the descending and sigmoid colon suggesting possible colitis.  There are some scattered diverticula as well.  There is no evidence of cholecystitis, pancreatitis, appendicitis or diverticulitis.  No evidence of bowel obstruction.      The patient was admitted for further management by the medicine team.      Impression/diagnosis:  Abdominal pain, diffuse  Nausea without vomiting  Malaise and  fatigue  Hypertension, unspecified  Colitis      Critical care time billing is not warranted at this time      I personally saw the patient and made/approve the management plan and take responsibility for the patient management.      I independently interpreted the following study (S) EKG and diagnostic labs      I personally discussed the patient's management with the patient      I reviewed the results of the diagnostic labs and diagnostic imaging.  Formal radiology read was completed by the radiologist.      Jennifer Henley MD

## 2024-11-25 NOTE — ED PROVIDER NOTES
HPI   Chief Complaint   Patient presents with    Altered Mental Status     Pt alert to self, not answering questions appropriately. Son states LKW yesterday. Pt vomiting.        HPI    Patient is a 69-year-old male with a history of COPD being brought to the emergency department by his son for evaluation of altered mental status.  Patient's last known well was 12:30 PM yesterday when the son spoke to the patient on the phone.  Son states that the patient stated he was not feeling well yesterday and did not attend his children's games in the evening.  Son called the patient late this afternoon when the patient did not show up for work, and son states that the patient sounded confused on the phone.  Son came over to the patient's house to find him lying on the couch.  Patient was acutely altered, attempting to turn off the TV with his car keys and was not making any sense with his communication. Son immediately brought the patient here for evaluation.  Patient states that he has a cough and has been vomiting.  He does know where he is but is unable to tell me the year.  ROS is limited secondary to patient cooperation.    Patient History   Past Medical History:   Diagnosis Date    COPD (chronic obstructive pulmonary disease) (Multi)      No past surgical history on file.  No family history on file.  Social History     Tobacco Use    Smoking status: Former     Current packs/day: 0.00     Types: Cigarettes     Quit date: 3/28/2024     Years since quittin.6     Passive exposure: Past    Smokeless tobacco: Not on file   Vaping Use    Vaping status: Not on file   Substance Use Topics    Alcohol use: Not Currently    Drug use: Not Currently       Physical Exam   ED Triage Vitals [24 1330]   Temperature Heart Rate Respirations BP   36.8 °C (98.2 °F) 92 18 (!) 170/150      Pulse Ox Temp Source Heart Rate Source Patient Position   98 % Temporal Monitor Sitting      BP Location FiO2 (%)     Left arm --       Physical  Exam  Vitals and nursing note reviewed.   Constitutional:       Appearance: Normal appearance.   HENT:      Head: Normocephalic and atraumatic.   Eyes:      Extraocular Movements: Extraocular movements intact.      Pupils: Pupils are equal, round, and reactive to light.   Cardiovascular:      Rate and Rhythm: Normal rate and regular rhythm.      Pulses: Normal pulses.   Pulmonary:      Effort: Pulmonary effort is normal.      Comments: Coarse lung sounds throughout with a wet sounding cough.  Abdominal:      General: Abdomen is flat. Bowel sounds are normal.      Palpations: Abdomen is soft.   Skin:     General: Skin is warm and dry.   Neurological:      Mental Status: He is alert.           ED Course & MDM   Diagnoses as of 11/25/24 1906   Altered mental status, unspecified altered mental status type   Hypomagnesemia                 No data recorded     [place] Coma Scale Score: 15 (11/25/24 1843 : Princess Sorto RN)                           Medical Decision Making    Parts of this chart have been completed using voice recognition software. Please excuse any errors of transcription. Despite the medical decision making time stamp above-my medical decision making has taken place during the patient's entire visit. My thought process and reason for plan has been formulated from the time that I saw the patient until the time of disposition and is not specific to one specific moment during their visit and furthermore my MDM encompasses this entire chart and not only this text box.      HPI: Detailed above.    Exam: A medically appropriate exam performed, outlined above, given the known history and presentation.    History obtained from: Son    Social Determinants of Health considered during this visit: Lives at home    EKG interpreted by my attending physician, reviewed by myself.    Labs Reviewed   CBC WITH AUTO DIFFERENTIAL - Abnormal       Result Value    WBC 7.6      nRBC 0.0      RBC 4.83      Hemoglobin 15.3       Hematocrit 44.2      MCV 92      MCH 31.7      MCHC 34.6      RDW 13.0      Platelets 243      Neutrophils % 80.7      Immature Granulocytes %, Automated 0.3      Lymphocytes % 13.3      Monocytes % 5.4      Eosinophils % 0.0      Basophils % 0.3      Neutrophils Absolute 6.13 (*)     Immature Granulocytes Absolute, Automated 0.02      Lymphocytes Absolute 1.01      Monocytes Absolute 0.41      Eosinophils Absolute 0.00      Basophils Absolute 0.02     COMPREHENSIVE METABOLIC PANEL - Abnormal    Glucose 252 (*)     Sodium 134 (*)     Potassium 4.3      Chloride 98      Bicarbonate 26      Anion Gap 14      Urea Nitrogen 14      Creatinine 0.64      eGFR >90      Calcium 10.2      Albumin 4.2      Alkaline Phosphatase 71      Total Protein 7.1      AST 9      Bilirubin, Total 0.9      ALT 6 (*)    LIPASE - Abnormal    Lipase 5 (*)     Narrative:     Venipuncture immediately after or during the administration of Metamizole may lead to falsely low results. Testing should be performed immediately prior to Metamizole dosing.   MAGNESIUM - Abnormal    Magnesium 1.27 (*)    BLOOD GAS VENOUS FULL PANEL - Abnormal    POCT pH, Venous 7.43      POCT pCO2, Venous 42      POCT pO2, Venous 44      POCT SO2, Venous 79 (*)     POCT Oxy Hemoglobin, Venous 75.6 (*)     POCT Hematocrit Calculated, Venous 46.0      POCT Sodium, Venous 133 (*)     POCT Potassium, Venous 4.3      POCT Chloride, Venous 96 (*)     POCT Ionized Calicum, Venous 1.30      POCT Glucose, Venous 281 (*)     POCT Lactate, Venous 1.2      POCT Base Excess, Venous 3.1 (*)     POCT HCO3 Calculated, Venous 27.9 (*)     POCT Hemoglobin, Venous 15.2      POCT Anion Gap, Venous 13.0      Patient Temperature 37.0      FiO2 93     POCT GLUCOSE - Abnormal    POCT Glucose 273 (*)    LACTATE - Normal    Lactate 1.0      Narrative:     Venipuncture immediately after or during the administration of Metamizole may lead to falsely low results. Testing should be performed  immediately prior to Metamizole dosing.   SERIAL TROPONIN-INITIAL - Normal    Troponin I, High Sensitivity 3      Narrative:     Less than 99th percentile of normal range cutoff-  Female and children under 18 years old <14 ng/L; Male <21 ng/L: Negative  Repeat testing should be performed if clinically indicated.     Female and children under 18 years old 14-50 ng/L; Male 21-50 ng/L:  Consistent with possible cardiac damage and possible increased clinical   risk. Serial measurements may help to assess extent of myocardial damage.     >50 ng/L: Consistent with cardiac damage, increased clinical risk and  myocardial infarction. Serial measurements may help assess extent of   myocardial damage.      NOTE: Children less than 1 year old may have higher baseline troponin   levels and results should be interpreted in conjunction with the overall   clinical context.     NOTE: Troponin I testing is performed using a different   testing methodology at [facility] than at other   [facility]. Direct result comparisons should only   be made within the same method.   SERIAL TROPONIN, 1 HOUR - Normal    Troponin I, High Sensitivity 5      Narrative:     Less than 99th percentile of normal range cutoff-  Female and children under 18 years old <14 ng/L; Male <21 ng/L: Negative  Repeat testing should be performed if clinically indicated.     Female and children under 18 years old 14-50 ng/L; Male 21-50 ng/L:  Consistent with possible cardiac damage and possible increased clinical   risk. Serial measurements may help to assess extent of myocardial damage.     >50 ng/L: Consistent with cardiac damage, increased clinical risk and  myocardial infarction. Serial measurements may help assess extent of   myocardial damage.      NOTE: Children less than 1 year old may have higher baseline troponin   levels and results should be interpreted in conjunction with the overall   clinical context.     NOTE: Troponin I testing is  performed using a different   testing methodology at [facility] than at other   [facility]. Direct result comparisons should only   be made within the same method.   TROPONIN SERIES- (INITIAL, 1 HR)    Narrative:     The following orders were created for panel order Troponin Series, (0, 1 HR).  Procedure                               Abnormality         Status                     ---------                               -----------         ------                     Troponin I, High Sensiti...[[id number]]  Normal              Final result               Troponin, High Sensitivi...[[id number]]  Normal              Final result                 Please view results for these tests on the individual orders.   URINALYSIS WITH REFLEX CULTURE AND MICROSCOPIC    Narrative:     The following orders were created for panel order Urinalysis with Reflex Culture and Microscopic.  Procedure                               Abnormality         Status                     ---------                               -----------         ------                     Urinalysis with Reflex C...[[id number]]                      In process                 Extra Urine Gray Tube[[id number]]                            In process                   Please view results for these tests on the individual orders.   URINALYSIS WITH REFLEX CULTURE AND MICROSCOPIC   EXTRA URINE GRAY TUBE   URINALYSIS MICROSCOPIC WITH REFLEX CULTURE     CT abdomen pelvis w IV contrast   Final Result   Mild wall thickening of the descending sigmoid colon suggesting   component of colitis.  Scattered diverticula.   Mild wall thickening of the stomach suggesting gastritis.   Nonobstructive bilateral renal calculi with 6 mm calculus in the right   renal pelvis.   Indeterminate bilateral adrenal nodules.  Findings would better   evaluated with MRI or dynamic contrast enhanced CT.  Findings appear   stable.   Trace right pleural fluid.    Significant degenerative changes  at L5-S1.   Signed by Puma Kim, DO      CT head wo IV contrast   Final Result   No acute intracranial pathology.        MACRO:   None        Signed by: Мария Linder 11/25/2024 3:49 PM   Dictation workstation:   [id number]      XR chest 1 view   Final Result   An area of subpleural nodularity in the right upper lobe is similar   to the prior exam and may represent scarring. Short-term follow-up or   CT of the chest as clinically warranted.        MACRO:   None        Signed by: Jus Martinez 11/25/2024 2:32 PM   Dictation workstation:   [id number]        Medications   magnesium sulfate 2 g in sterile water for injection 50 mL (2 g intravenous New Bag 11/25/24 1647)   iohexol (OMNIPaque) 350 mg iodine/mL solution 75 mL (75 mL intravenous Given 11/25/24 1541)   famotidine PF (Pepcid) injection 20 mg (20 mg intravenous Given 11/25/24 1648)   morphine injection 4 mg (4 mg intravenous Given 11/25/24 1648)   ondansetron (Zofran) injection 4 mg (4 mg intravenous Given 11/25/24 1648)       Differential diagnoses considered for this visit include: Acute encephalopathy versus urinary tract infection versus electrolyte imbalance versus metabolic disturbance versus acute intracranial processes    Considerations/further MDM:    Patient is a 69-year-old male presenting with his son for evaluation of altered mental status.  Last known well was sometime around 12:30 PM yesterday.  Patient refused to cooperate with NIH testing, however I do see that he is spontaneously moving all of his extremities and son states he was able to ambulate patient to the car to bring him to the emergency department.  Patient is out of the window for TIA or TNK administration given that his last known well was sometime yesterday.  CT head was negative for acute intracranial processes.  CMP demonstrated hyperglycemia.  CBC was within normal limits.  Blood gas was normal.  Lipase was 5.  Magnesium was low at 1.27.  Initial troponin of 3,  repeat of 5. Chest x-ray showed an area of subpleural nodularity in the right upper lobe that is similar to the prior exam and may represent scarring. CT of the abdomen pelvis identified mild wall thickening of the descending sigmoid colon suggesting component of colitis.  There is mild wall thickening of the stomach suggesting gastritis.  There is nonobstructive bilateral renal calculi with 6 mm calculus in the right renal pelvis.  Given patient's acutely altered mental status and hypomagnesemia, admission to the hospital was recommended.  I discussed this with the patient and the son, who were agreeable to admission.  I spoke to the on-call provider Dr. Stoddard, who accepted the patient to the regular nursing floor with telemetry in good condition.  Urinalysis was pending on admission.    The patient/family was counseled on clinical impression, expectations, and plan along with recommendations to admission. All questions were answered and involved parties were understanding and agreeable to course of treatment. Case was discussed with admitting physician and any consultants. Bed type, ED treatment and further ED workup decided by joint decision making with admitting team and any consultants. Patient stable for admission per my assessment and further management of patient will be deferred to the inpatient setting.    Patient was seen in conjunction with attending physician Dr. Jennifer Henley   Patient's history, physical exam, diagnostic studies, and treatment plan were discussed thoroughly.  Procedure  Procedures     Imani Crabtree PA-C  11/25/24 1852       Imani Crabtree PA-C  11/25/24 1906

## 2024-11-26 ENCOUNTER — APPOINTMENT (OUTPATIENT)
Dept: CARDIOLOGY | Facility: HOSPITAL | Age: 69
End: 2024-11-26
Payer: OTHER GOVERNMENT

## 2024-11-26 ENCOUNTER — APPOINTMENT (OUTPATIENT)
Dept: RADIOLOGY | Facility: HOSPITAL | Age: 69
End: 2024-11-26
Payer: OTHER GOVERNMENT

## 2024-11-26 LAB
ALBUMIN SERPL BCP-MCNC: 4 G/DL (ref 3.4–5)
ALP SERPL-CCNC: 65 U/L (ref 33–136)
ALT SERPL W P-5'-P-CCNC: 6 U/L (ref 10–52)
ANION GAP SERPL CALCULATED.3IONS-SCNC: 10 MMOL/L (ref 10–20)
AST SERPL W P-5'-P-CCNC: 11 U/L (ref 9–39)
BASOPHILS # BLD AUTO: 0.03 X10*3/UL (ref 0–0.1)
BASOPHILS NFR BLD AUTO: 0.3 %
BILIRUB SERPL-MCNC: 0.7 MG/DL (ref 0–1.2)
BUN SERPL-MCNC: 12 MG/DL (ref 6–23)
CALCIUM SERPL-MCNC: 9.5 MG/DL (ref 8.6–10.3)
CHLORIDE SERPL-SCNC: 100 MMOL/L (ref 98–107)
CO2 SERPL-SCNC: 31 MMOL/L (ref 21–32)
CREAT SERPL-MCNC: 0.67 MG/DL (ref 0.5–1.3)
EGFRCR SERPLBLD CKD-EPI 2021: >90 ML/MIN/1.73M*2
EOSINOPHIL # BLD AUTO: 0.04 X10*3/UL (ref 0–0.4)
EOSINOPHIL NFR BLD AUTO: 0.4 %
ERYTHROCYTE [DISTWIDTH] IN BLOOD BY AUTOMATED COUNT: 13.2 % (ref 11.5–14.5)
EST. AVERAGE GLUCOSE BLD GHB EST-MCNC: 174 MG/DL
GLUCOSE BLD MANUAL STRIP-MCNC: 142 MG/DL (ref 74–99)
GLUCOSE BLD MANUAL STRIP-MCNC: 164 MG/DL (ref 74–99)
GLUCOSE BLD MANUAL STRIP-MCNC: 217 MG/DL (ref 74–99)
GLUCOSE BLD MANUAL STRIP-MCNC: 221 MG/DL (ref 74–99)
GLUCOSE BLD MANUAL STRIP-MCNC: 221 MG/DL (ref 74–99)
GLUCOSE BLD MANUAL STRIP-MCNC: 232 MG/DL (ref 74–99)
GLUCOSE BLD MANUAL STRIP-MCNC: 233 MG/DL (ref 74–99)
GLUCOSE SERPL-MCNC: 156 MG/DL (ref 74–99)
HBA1C MFR BLD: 7.7 %
HCT VFR BLD AUTO: 42.7 % (ref 41–52)
HGB BLD-MCNC: 14.9 G/DL (ref 13.5–17.5)
HOLD SPECIMEN: NORMAL
IMM GRANULOCYTES # BLD AUTO: 0.03 X10*3/UL (ref 0–0.5)
IMM GRANULOCYTES NFR BLD AUTO: 0.3 % (ref 0–0.9)
LYMPHOCYTES # BLD AUTO: 1.24 X10*3/UL (ref 0.8–3)
LYMPHOCYTES NFR BLD AUTO: 11.8 %
MAGNESIUM SERPL-MCNC: 2.08 MG/DL (ref 1.6–2.4)
MCH RBC QN AUTO: 31.8 PG (ref 26–34)
MCHC RBC AUTO-ENTMCNC: 34.9 G/DL (ref 32–36)
MCV RBC AUTO: 91 FL (ref 80–100)
MONOCYTES # BLD AUTO: 0.97 X10*3/UL (ref 0.05–0.8)
MONOCYTES NFR BLD AUTO: 9.3 %
NEUTROPHILS # BLD AUTO: 8.16 X10*3/UL (ref 1.6–5.5)
NEUTROPHILS NFR BLD AUTO: 77.9 %
NRBC BLD-RTO: 0 /100 WBCS (ref 0–0)
PLATELET # BLD AUTO: 231 X10*3/UL (ref 150–450)
POTASSIUM SERPL-SCNC: 4 MMOL/L (ref 3.5–5.3)
PROT SERPL-MCNC: 6.6 G/DL (ref 6.4–8.2)
RBC # BLD AUTO: 4.69 X10*6/UL (ref 4.5–5.9)
SODIUM SERPL-SCNC: 137 MMOL/L (ref 136–145)
WBC # BLD AUTO: 10.5 X10*3/UL (ref 4.4–11.3)

## 2024-11-26 PROCEDURE — 2500000004 HC RX 250 GENERAL PHARMACY W/ HCPCS (ALT 636 FOR OP/ED): Performed by: NURSE PRACTITIONER

## 2024-11-26 PROCEDURE — 93005 ELECTROCARDIOGRAM TRACING: CPT

## 2024-11-26 PROCEDURE — 82947 ASSAY GLUCOSE BLOOD QUANT: CPT

## 2024-11-26 PROCEDURE — 71250 CT THORAX DX C-: CPT

## 2024-11-26 PROCEDURE — 83036 HEMOGLOBIN GLYCOSYLATED A1C: CPT | Mod: WESLAB | Performed by: INTERNAL MEDICINE

## 2024-11-26 PROCEDURE — 71250 CT THORAX DX C-: CPT | Performed by: RADIOLOGY

## 2024-11-26 PROCEDURE — 2500000001 HC RX 250 WO HCPCS SELF ADMINISTERED DRUGS (ALT 637 FOR MEDICARE OP): Performed by: NURSE PRACTITIONER

## 2024-11-26 PROCEDURE — 2500000001 HC RX 250 WO HCPCS SELF ADMINISTERED DRUGS (ALT 637 FOR MEDICARE OP): Performed by: STUDENT IN AN ORGANIZED HEALTH CARE EDUCATION/TRAINING PROGRAM

## 2024-11-26 PROCEDURE — 2500000002 HC RX 250 W HCPCS SELF ADMINISTERED DRUGS (ALT 637 FOR MEDICARE OP, ALT 636 FOR OP/ED): Performed by: NURSE PRACTITIONER

## 2024-11-26 PROCEDURE — 83735 ASSAY OF MAGNESIUM: CPT | Performed by: INTERNAL MEDICINE

## 2024-11-26 PROCEDURE — 94640 AIRWAY INHALATION TREATMENT: CPT

## 2024-11-26 PROCEDURE — 1200000002 HC GENERAL ROOM WITH TELEMETRY DAILY

## 2024-11-26 PROCEDURE — 97166 OT EVAL MOD COMPLEX 45 MIN: CPT | Mod: GO

## 2024-11-26 PROCEDURE — 84520 ASSAY OF UREA NITROGEN: CPT | Performed by: NURSE PRACTITIONER

## 2024-11-26 PROCEDURE — 97162 PT EVAL MOD COMPLEX 30 MIN: CPT | Mod: GP

## 2024-11-26 PROCEDURE — 2500000002 HC RX 250 W HCPCS SELF ADMINISTERED DRUGS (ALT 637 FOR MEDICARE OP, ALT 636 FOR OP/ED): Performed by: STUDENT IN AN ORGANIZED HEALTH CARE EDUCATION/TRAINING PROGRAM

## 2024-11-26 PROCEDURE — 85025 COMPLETE CBC W/AUTO DIFF WBC: CPT | Performed by: NURSE PRACTITIONER

## 2024-11-26 PROCEDURE — 2500000004 HC RX 250 GENERAL PHARMACY W/ HCPCS (ALT 636 FOR OP/ED): Performed by: STUDENT IN AN ORGANIZED HEALTH CARE EDUCATION/TRAINING PROGRAM

## 2024-11-26 PROCEDURE — 99222 1ST HOSP IP/OBS MODERATE 55: CPT | Performed by: STUDENT IN AN ORGANIZED HEALTH CARE EDUCATION/TRAINING PROGRAM

## 2024-11-26 PROCEDURE — 99221 1ST HOSP IP/OBS SF/LOW 40: CPT | Performed by: STUDENT IN AN ORGANIZED HEALTH CARE EDUCATION/TRAINING PROGRAM

## 2024-11-26 PROCEDURE — 36415 COLL VENOUS BLD VENIPUNCTURE: CPT | Performed by: NURSE PRACTITIONER

## 2024-11-26 RX ORDER — ALBUTEROL SULFATE 0.83 MG/ML
2.5 SOLUTION RESPIRATORY (INHALATION) EVERY 2 HOUR PRN
Status: DISCONTINUED | OUTPATIENT
Start: 2024-11-26 | End: 2024-11-29 | Stop reason: HOSPADM

## 2024-11-26 RX ORDER — IPRATROPIUM BROMIDE AND ALBUTEROL SULFATE 2.5; .5 MG/3ML; MG/3ML
3 SOLUTION RESPIRATORY (INHALATION)
Status: DISCONTINUED | OUTPATIENT
Start: 2024-11-27 | End: 2024-11-29 | Stop reason: HOSPADM

## 2024-11-26 RX ORDER — IPRATROPIUM BROMIDE AND ALBUTEROL SULFATE 2.5; .5 MG/3ML; MG/3ML
3 SOLUTION RESPIRATORY (INHALATION)
Status: DISCONTINUED | OUTPATIENT
Start: 2024-11-26 | End: 2024-11-26

## 2024-11-26 RX ORDER — CEFTRIAXONE 1 G/50ML
1 INJECTION, SOLUTION INTRAVENOUS EVERY 24 HOURS
Status: DISCONTINUED | OUTPATIENT
Start: 2024-11-26 | End: 2024-11-27

## 2024-11-26 RX ORDER — DOXYCYCLINE 100 MG/1
100 CAPSULE ORAL EVERY 12 HOURS SCHEDULED
Status: DISCONTINUED | OUTPATIENT
Start: 2024-11-26 | End: 2024-11-29 | Stop reason: HOSPADM

## 2024-11-26 RX ORDER — PREDNISONE 20 MG/1
20 TABLET ORAL DAILY
Status: DISCONTINUED | OUTPATIENT
Start: 2024-11-26 | End: 2024-11-29 | Stop reason: HOSPADM

## 2024-11-26 SDOH — SOCIAL STABILITY: SOCIAL NETWORK: HOW OFTEN DO YOU GET TOGETHER WITH FRIENDS OR RELATIVES?: MORE THAN THREE TIMES A WEEK

## 2024-11-26 SDOH — ECONOMIC STABILITY: TRANSPORTATION INSECURITY: IN THE PAST 12 MONTHS, HAS LACK OF TRANSPORTATION KEPT YOU FROM MEDICAL APPOINTMENTS OR FROM GETTING MEDICATIONS?: NO

## 2024-11-26 SDOH — SOCIAL STABILITY: SOCIAL NETWORK: HOW OFTEN DO YOU ATTEND MEETINGS OF THE CLUBS OR ORGANIZATIONS YOU BELONG TO?: NEVER

## 2024-11-26 SDOH — ECONOMIC STABILITY: FOOD INSECURITY: HOW HARD IS IT FOR YOU TO PAY FOR THE VERY BASICS LIKE FOOD, HOUSING, MEDICAL CARE, AND HEATING?: NOT VERY HARD

## 2024-11-26 SDOH — HEALTH STABILITY: PHYSICAL HEALTH
HOW OFTEN DO YOU NEED TO HAVE SOMEONE HELP YOU WHEN YOU READ INSTRUCTIONS, PAMPHLETS, OR OTHER WRITTEN MATERIAL FROM YOUR DOCTOR OR PHARMACY?: RARELY

## 2024-11-26 SDOH — HEALTH STABILITY: MENTAL HEALTH
DO YOU FEEL STRESS - TENSE, RESTLESS, NERVOUS, OR ANXIOUS, OR UNABLE TO SLEEP AT NIGHT BECAUSE YOUR MIND IS TROUBLED ALL THE TIME - THESE DAYS?: ONLY A LITTLE

## 2024-11-26 SDOH — ECONOMIC STABILITY: HOUSING INSECURITY: AT ANY TIME IN THE PAST 12 MONTHS, WERE YOU HOMELESS OR LIVING IN A SHELTER (INCLUDING NOW)?: NO

## 2024-11-26 SDOH — SOCIAL STABILITY: SOCIAL NETWORK: HOW OFTEN DO YOU ATTEND CHURCH OR RELIGIOUS SERVICES?: NEVER

## 2024-11-26 SDOH — ECONOMIC STABILITY: FOOD INSECURITY: WITHIN THE PAST 12 MONTHS, THE FOOD YOU BOUGHT JUST DIDN'T LAST AND YOU DIDN'T HAVE MONEY TO GET MORE.: NEVER TRUE

## 2024-11-26 SDOH — ECONOMIC STABILITY: INCOME INSECURITY: IN THE PAST 12 MONTHS HAS THE ELECTRIC, GAS, OIL, OR WATER COMPANY THREATENED TO SHUT OFF SERVICES IN YOUR HOME?: NO

## 2024-11-26 SDOH — SOCIAL STABILITY: SOCIAL INSECURITY
WITHIN THE LAST YEAR, HAVE YOU BEEN RAPED OR FORCED TO HAVE ANY KIND OF SEXUAL ACTIVITY BY YOUR PARTNER OR EX-PARTNER?: NO

## 2024-11-26 SDOH — SOCIAL STABILITY: SOCIAL INSECURITY: WITHIN THE LAST YEAR, HAVE YOU BEEN HUMILIATED OR EMOTIONALLY ABUSED IN OTHER WAYS BY YOUR PARTNER OR EX-PARTNER?: NO

## 2024-11-26 SDOH — HEALTH STABILITY: MENTAL HEALTH: HOW OFTEN DO YOU HAVE SIX OR MORE DRINKS ON ONE OCCASION?: NEVER

## 2024-11-26 SDOH — HEALTH STABILITY: MENTAL HEALTH: HOW OFTEN DO YOU HAVE A DRINK CONTAINING ALCOHOL?: MONTHLY OR LESS

## 2024-11-26 SDOH — HEALTH STABILITY: MENTAL HEALTH: HOW MANY DRINKS CONTAINING ALCOHOL DO YOU HAVE ON A TYPICAL DAY WHEN YOU ARE DRINKING?: 1 OR 2

## 2024-11-26 SDOH — ECONOMIC STABILITY: FOOD INSECURITY: WITHIN THE PAST 12 MONTHS, YOU WORRIED THAT YOUR FOOD WOULD RUN OUT BEFORE YOU GOT THE MONEY TO BUY MORE.: NEVER TRUE

## 2024-11-26 SDOH — SOCIAL STABILITY: SOCIAL NETWORK
DO YOU BELONG TO ANY CLUBS OR ORGANIZATIONS SUCH AS CHURCH GROUPS, UNIONS, FRATERNAL OR ATHLETIC GROUPS, OR SCHOOL GROUPS?: NO

## 2024-11-26 SDOH — SOCIAL STABILITY: SOCIAL INSECURITY: WITHIN THE LAST YEAR, HAVE YOU BEEN AFRAID OF YOUR PARTNER OR EX-PARTNER?: NO

## 2024-11-26 SDOH — ECONOMIC STABILITY: HOUSING INSECURITY: IN THE LAST 12 MONTHS, WAS THERE A TIME WHEN YOU WERE NOT ABLE TO PAY THE MORTGAGE OR RENT ON TIME?: NO

## 2024-11-26 SDOH — HEALTH STABILITY: PHYSICAL HEALTH: ON AVERAGE, HOW MANY MINUTES DO YOU ENGAGE IN EXERCISE AT THIS LEVEL?: 10 MIN

## 2024-11-26 SDOH — HEALTH STABILITY: PHYSICAL HEALTH: ON AVERAGE, HOW MANY DAYS PER WEEK DO YOU ENGAGE IN MODERATE TO STRENUOUS EXERCISE (LIKE A BRISK WALK)?: 5 DAYS

## 2024-11-26 ASSESSMENT — COGNITIVE AND FUNCTIONAL STATUS - GENERAL
CLIMB 3 TO 5 STEPS WITH RAILING: A LITTLE
DAILY ACTIVITIY SCORE: 22
TOILETING: A LITTLE
DAILY ACTIVITIY SCORE: 17
MOBILITY SCORE: 16
DRESSING REGULAR LOWER BODY CLOTHING: A LOT
MOVING TO AND FROM BED TO CHAIR: A LITTLE
DRESSING REGULAR UPPER BODY CLOTHING: A LITTLE
DAILY ACTIVITIY SCORE: 22
WALKING IN HOSPITAL ROOM: A LOT
DRESSING REGULAR LOWER BODY CLOTHING: A LITTLE
MOBILITY SCORE: 22
STANDING UP FROM CHAIR USING ARMS: A LITTLE
HELP NEEDED FOR BATHING: A LOT
CLIMB 3 TO 5 STEPS WITH RAILING: A LOT
DRESSING REGULAR LOWER BODY CLOTHING: A LITTLE
HELP NEEDED FOR BATHING: A LITTLE
HELP NEEDED FOR BATHING: A LITTLE
MOBILITY SCORE: 23
PERSONAL GROOMING: A LITTLE
TURNING FROM BACK TO SIDE WHILE IN FLAT BAD: A LITTLE
WALKING IN HOSPITAL ROOM: A LITTLE
MOVING FROM LYING ON BACK TO SITTING ON SIDE OF FLAT BED WITH BEDRAILS: A LITTLE
CLIMB 3 TO 5 STEPS WITH RAILING: A LITTLE

## 2024-11-26 ASSESSMENT — ENCOUNTER SYMPTOMS
CONFUSION: 1
ABDOMINAL PAIN: 0
ACTIVITY CHANGE: 1
ALLERGIC/IMMUNOLOGIC NEGATIVE: 1
ENDOCRINE NEGATIVE: 1
DIARRHEA: 1
CONSTIPATION: 0
WEAKNESS: 0
APPETITE CHANGE: 1
HEMATOLOGIC/LYMPHATIC NEGATIVE: 1
NAUSEA: 0
FEVER: 0
RESPIRATORY NEGATIVE: 1
FATIGUE: 1
EYES NEGATIVE: 1
CARDIOVASCULAR NEGATIVE: 1
WEAKNESS: 1
MUSCULOSKELETAL NEGATIVE: 1
CHILLS: 0
VOMITING: 0

## 2024-11-26 ASSESSMENT — PAIN - FUNCTIONAL ASSESSMENT
PAIN_FUNCTIONAL_ASSESSMENT: 0-10

## 2024-11-26 ASSESSMENT — ACTIVITIES OF DAILY LIVING (ADL)
ADL_ASSISTANCE: INDEPENDENT
BATHING_ASSISTANCE: MODERATE
LACK_OF_TRANSPORTATION: NO
ADL_ASSISTANCE: INDEPENDENT
ADLS_ADDRESSED: NO
LACK_OF_TRANSPORTATION: NO

## 2024-11-26 ASSESSMENT — PAIN SCALES - GENERAL
PAINLEVEL_OUTOF10: 6
PAINLEVEL_OUTOF10: 0 - NO PAIN
PAINLEVEL_OUTOF10: 6
PAINLEVEL_OUTOF10: 0 - NO PAIN

## 2024-11-26 ASSESSMENT — LIFESTYLE VARIABLES
SKIP TO QUESTIONS 9-10: 1
AUDIT-C TOTAL SCORE: 1

## 2024-11-26 NOTE — CARE PLAN
Patient is having Ct scan on 11-9-23  Please advise   The patient's goals for the shift include      The clinical goals for the shift include IV fluids, replace MG, stable VS, safety

## 2024-11-26 NOTE — CONSULTS
Pulmonary Consultation Note   Subjective    Aaron Shaw is a 69 y.o. year old male patient known with 69 y.o. male with PMHX significant for  COPD/ emphysema (no PFT on chart, on spiriva, no Pulm OP doctor), chronic pancreatitis, DJD, DM2, diabetic neuropathy, HLD, HTN, SSC of skiin, tobacco abuse, alcohol abuse, illicit drug abuse, pulmonary nodule, DJD, and low back pain presented to the emergency department for mental status change and abdominal pain.  admitted on 11/25/2024 with following abdominal pain and AMS. Pulmonary consulted for emphysema, lung congestion and lung nodule     History of Present Illness:  Patient initially denies shortness of breath, and denies cough.  However during conversation he was coughing and is wearing 1 L nasal cannula oxygen.  Initially he denied history of lung disease however mentions that he is being treated with Spiriva at home by possibly his primary care physician.  He has been trialed on other inhalers in the past and he has a nebulizer machine but he has not used it in in the last few years.  He does not recall needing steroids for exacerbations however in April when he was admitted it looks like he was discharged on on prednisone.  In April he was admitted with a pneumonia and the and required oxygen.  However patient denies any oxygen home use.  Patient reports vomiting of clear secretion.  Denies swallowing issues    He works as a  for a company.  He remains to smoke a pack a day.    Past Medical History  He has a past medical history of COPD (chronic obstructive pulmonary disease) (Multi), Diabetes mellitus (Multi), GERD (gastroesophageal reflux disease), Hypertension, Jaundice, and Substance abuse (Multi).    Surgical History  He has no past surgical history on file.     Social History  He reports that he quit smoking about 7 months ago. His smoking use included cigarettes. He has been exposed to tobacco smoke. He does not have any smokeless tobacco history  "on file. He reports that he does not currently use alcohol. He reports that he does not currently use drugs.  Cigarette smoking history: Current smoker  Marijuana use:  Vaping:  Pets:   Asbestos:  Other specific exposure: Worked as a .      Family History  No family history on file.     Allergies  Patient has no known allergies.    Review of Systems  As above      Meds    Scheduled medications  enoxaparin, 40 mg, subcutaneous, Daily  insulin lispro, 0-5 Units, subcutaneous, q4h  lisinopril, 5 mg, oral, Daily  nicotine, 1 patch, transdermal, Daily  pantoprazole, 20 mg, intravenous, Daily  pregabalin, 300 mg, oral, BID      Continuous medications  sodium chloride 0.9%, 75 mL/hr, Last Rate: 75 mL/hr (11/26/24 1842)      PRN medications  PRN medications: acetaminophen **OR** acetaminophen **OR** acetaminophen, morphine, ondansetron **OR** ondansetron     Objective      Last Recorded Vitals  /78 (BP Location: Left arm, Patient Position: Lying)   Pulse 75   Temp 37.2 °C (99 °F) (Temporal)   Resp 20   Wt 77.2 kg (170 lb 3.1 oz)   SpO2 96%     Blood pressure 133/78, pulse 75, temperature 37.2 °C (99 °F), temperature source Temporal, resp. rate 20, height 1.829 m (6' 0.01\"), weight 77.2 kg (170 lb 3.1 oz), SpO2 96%.   Physical Exam   GENERAL: No respiratory distress  HEAD/SINUSES: On 1 L oxygen  LUNGS: Rhonchi and wheezing bilaterally  CARDIAC:Regular rate and rhythm  EXTREMITIES: No edema,  NEURO: grossly normal mental status  SKIN: Skin turgor normal.  PSYCH: Normal affect    Intake/Output Summary (Last 24 hours) at 11/26/2024 2005  Last data filed at 11/26/2024 1842  Gross per 24 hour   Intake 2401.67 ml   Output 600 ml   Net 1801.67 ml     Labs:   Results from last 72 hours   Lab Units 11/26/24  0516 11/25/24  1403   SODIUM mmol/L 137 134*   POTASSIUM mmol/L 4.0 4.3   CHLORIDE mmol/L 100 98   CO2 mmol/L 31 26   BUN mg/dL 12 14   CREATININE mg/dL 0.67 0.64   GLUCOSE mg/dL 156* 252*   CALCIUM mg/dL 9.5 " 10.2   ANION GAP mmol/L 10 14   EGFR mL/min/1.73m*2 >90 >90      Results from last 72 hours   Lab Units 11/26/24  0516 11/25/24  1403   WBC AUTO x10*3/uL 10.5 7.6   HEMOGLOBIN g/dL 14.9 15.3   HEMATOCRIT % 42.7 44.2   PLATELETS AUTO x10*3/uL 231 243   NEUTROS PCT AUTO % 77.9 80.7   LYMPHS PCT AUTO % 11.8 13.3   MONOS PCT AUTO % 9.3 5.4   EOS PCT AUTO % 0.4 0.0          Results from last 72 hours   Lab Units 11/25/24  1403   POCT PH, VENOUS pH 7.43   POCT PCO2, VENOUS mm Hg 42   POCT PO2, VENOUS mm Hg 44      Micro/ID:   Lab Results   Component Value Date    BLOODCULT No growth at 4 days -  FINAL REPORT 04/11/2024    BLOODCULT No growth at 4 days -  FINAL REPORT 04/11/2024     Summary of key imaging results from the last 24 hours  Chest x-ray right upper lobe opacification versus scarring  CT chest has not been read yet however demonstrates severe emphysema, infiltrates  in the right lower lobe, right upper lobe scarring    Impression   Aaron Shaw is a 69 y.o. year old male patient known with 69 y.o. male with PMHX significant for  COPD/ emphysema (no PFT on chart, on spiriva, no Pulm OP doctor), chronic pancreatitis, DJD, DM2, diabetic neuropathy, HLD, HTN, SSC of skiin, tobacco abuse, alcohol abuse, illicit drug abuse, pulmonary nodule, DJD, and low back pain presented to the emergency department for mental status change and abdominal pain.  admitted on 11/25/2024 with following abdominal pain and AMS. Pulmonary consulted for emphysema, lung congestion and lung nodule   Acute hypoxic respiratory failure 1 L of oxygen due to severe emphysema, COPD exacerbation, aspiration versus bronchopneumonia picture  COPD//emphysema no PFTs on chart, no pulmonologist, on Spiriva, currently in exacerbation  Heavy smoker  Right upper lobe scarring most probably caused by pneumonia in April  Bronchopneumonia vs  aspiration from vomiting    Recommendations   As follows:  AECOPD management with prednisone for 5 days (prescribed  lower dose because of gastritis), scheduled aerosol treatment, and doxycycline  For the bronchopneumonia will start ceftriaxone and follow sputum culture, if improvement in clinical status can be discharged on oral antibiotic only   Wean oxygen as tolerated  Bronchopulmonary hygiene with Incentive spirometry and Acapella   Smoking cessation  Could consider SLP evaluation  Patient will need outpatient pulmonary follow-up for PFTs and COPD management  Once ready for discharge patient can continue Spiriva 2 puffs daily and albuterol as needed.  After PFT evaluation outpatient could consider LABA/LAMA  Patient will need repeat imaging in 6 to 8 weeks to ensure resolution of infiltrates and follow-up on right upper lobe scarring.    Katelyn Montes MD   11/26/24 at 8:05 PM     -Only the Medical problems listed under impression were addressed today.   -Please contact primary team for all other concerns and medical problem  -Thank you for your consult     Disclaimer: Documentation completed with the information available at the time of input. Parts of this note may have been scribed or generated using voice dictation software, Dragon.  Homophonic errors may exist.  Please contact me directly if clarification is needed. The times in the chart may not be reflective of actual patient care times, interventions, or procedures. Documentation occurs after the physical care of the patient.

## 2024-11-26 NOTE — CONSULTS
Consults    Reason For Consult  NVD     History Of Present Illness  Aaron Shaw is a 69 y.o. male presenting with altered mental status. Patient is currently a/ox4; however, he has no memory of what brought him into the hospital. He was having diarrhea and pain at home but unable to tell how much or where. Pain has resolved. LFTs and lipase normal. CT showing gastric and sigmoid wall thickening. WBC is normal. He denies prior EGD. HH normal. Last colonoscopy over 5 years      Past Medical History  He has a past medical history of COPD (chronic obstructive pulmonary disease) (Multi), Diabetes mellitus (Multi), GERD (gastroesophageal reflux disease), Hypertension, Jaundice, and Substance abuse (Multi).    Surgical History  He has no past surgical history on file.     Social History  He reports that he quit smoking about 7 months ago. His smoking use included cigarettes. He has been exposed to tobacco smoke. He does not have any smokeless tobacco history on file. He reports that he does not currently use alcohol. He reports that he does not currently use drugs.    Family History  No family history on file.     Allergies  Patient has no known allergies.    Review of Systems   Constitutional:  Negative for chills and fever.   Gastrointestinal:  Positive for diarrhea. Negative for abdominal pain, constipation, nausea and vomiting.   Neurological:  Negative for weakness.        Physical Exam  Vitals reviewed.   Constitutional:       General: He is awake.      Appearance: Normal appearance.   HENT:      Head: Normocephalic and atraumatic.      Mouth/Throat:      Mouth: Mucous membranes are moist.   Cardiovascular:      Rate and Rhythm: Normal rate and regular rhythm.   Pulmonary:      Effort: Pulmonary effort is normal.      Breath sounds: Normal breath sounds.   Abdominal:      General: There is no distension.      Palpations: Abdomen is soft.      Tenderness: There is no abdominal tenderness. There is no guarding.  "  Musculoskeletal:      Cervical back: Normal range of motion and neck supple.   Skin:     General: Skin is warm and dry.   Neurological:      General: No focal deficit present.      Mental Status: He is alert and oriented to person, place, and time. Mental status is at baseline.   Psychiatric:         Attention and Perception: Attention and perception normal.         Mood and Affect: Mood normal.         Behavior: Behavior normal.          Last Recorded Vitals  Blood pressure 146/68, pulse 90, temperature 36 °C (96.8 °F), temperature source Temporal, resp. rate 20, height 1.829 m (6' 0.01\"), weight 77.2 kg (170 lb 3.1 oz), SpO2 96%.    Relevant Results  Results for orders placed or performed during the hospital encounter of 11/25/24 (from the past 24 hours)   POCT GLUCOSE   Result Value Ref Range    POCT Glucose 273 (H) 74 - 99 mg/dL   CBC and Auto Differential   Result Value Ref Range    WBC 7.6 4.4 - 11.3 x10*3/uL    nRBC 0.0 0.0 - 0.0 /100 WBCs    RBC 4.83 4.50 - 5.90 x10*6/uL    Hemoglobin 15.3 13.5 - 17.5 g/dL    Hematocrit 44.2 41.0 - 52.0 %    MCV 92 80 - 100 fL    MCH 31.7 26.0 - 34.0 pg    MCHC 34.6 32.0 - 36.0 g/dL    RDW 13.0 11.5 - 14.5 %    Platelets 243 150 - 450 x10*3/uL    Neutrophils % 80.7 40.0 - 80.0 %    Immature Granulocytes %, Automated 0.3 0.0 - 0.9 %    Lymphocytes % 13.3 13.0 - 44.0 %    Monocytes % 5.4 2.0 - 10.0 %    Eosinophils % 0.0 0.0 - 6.0 %    Basophils % 0.3 0.0 - 2.0 %    Neutrophils Absolute 6.13 (H) 1.60 - 5.50 x10*3/uL    Immature Granulocytes Absolute, Automated 0.02 0.00 - 0.50 x10*3/uL    Lymphocytes Absolute 1.01 0.80 - 3.00 x10*3/uL    Monocytes Absolute 0.41 0.05 - 0.80 x10*3/uL    Eosinophils Absolute 0.00 0.00 - 0.40 x10*3/uL    Basophils Absolute 0.02 0.00 - 0.10 x10*3/uL   Comprehensive metabolic panel   Result Value Ref Range    Glucose 252 (H) 74 - 99 mg/dL    Sodium 134 (L) 136 - 145 mmol/L    Potassium 4.3 3.5 - 5.3 mmol/L    Chloride 98 98 - 107 mmol/L    " Bicarbonate 26 21 - 32 mmol/L    Anion Gap 14 10 - 20 mmol/L    Urea Nitrogen 14 6 - 23 mg/dL    Creatinine 0.64 0.50 - 1.30 mg/dL    eGFR >90 >60 mL/min/1.73m*2    Calcium 10.2 8.6 - 10.3 mg/dL    Albumin 4.2 3.4 - 5.0 g/dL    Alkaline Phosphatase 71 33 - 136 U/L    Total Protein 7.1 6.4 - 8.2 g/dL    AST 9 9 - 39 U/L    Bilirubin, Total 0.9 0.0 - 1.2 mg/dL    ALT 6 (L) 10 - 52 U/L   Lipase   Result Value Ref Range    Lipase 5 (L) 9 - 82 U/L   Magnesium   Result Value Ref Range    Magnesium 1.27 (L) 1.60 - 2.40 mg/dL   Lactate   Result Value Ref Range    Lactate 1.0 0.4 - 2.0 mmol/L   BLOOD GAS VENOUS FULL PANEL   Result Value Ref Range    POCT pH, Venous 7.43 7.33 - 7.43 pH    POCT pCO2, Venous 42 41 - 51 mm Hg    POCT pO2, Venous 44 35 - 45 mm Hg    POCT SO2, Venous 79 (H) 45 - 75 %    POCT Oxy Hemoglobin, Venous 75.6 (H) 45.0 - 75.0 %    POCT Hematocrit Calculated, Venous 46.0 41.0 - 52.0 %    POCT Sodium, Venous 133 (L) 136 - 145 mmol/L    POCT Potassium, Venous 4.3 3.5 - 5.3 mmol/L    POCT Chloride, Venous 96 (L) 98 - 107 mmol/L    POCT Ionized Calicum, Venous 1.30 1.10 - 1.33 mmol/L    POCT Glucose, Venous 281 (H) 74 - 99 mg/dL    POCT Lactate, Venous 1.2 0.4 - 2.0 mmol/L    POCT Base Excess, Venous 3.1 (H) -2.0 - 3.0 mmol/L    POCT HCO3 Calculated, Venous 27.9 (H) 22.0 - 26.0 mmol/L    POCT Hemoglobin, Venous 15.2 13.5 - 17.5 g/dL    POCT Anion Gap, Venous 13.0 10.0 - 25.0 mmol/L    Patient Temperature 37.0 degrees Celsius    FiO2 93 %   Troponin I, High Sensitivity, Initial   Result Value Ref Range    Troponin I, High Sensitivity 3 0 - 20 ng/L   ECG 12 lead   Result Value Ref Range    Ventricular Rate 63 BPM    Atrial Rate 63 BPM    AL Interval 152 ms    QRS Duration 90 ms    QT Interval 412 ms    QTC Calculation(Bazett) 421 ms    P Axis 53 degrees    R Axis 80 degrees    T Axis 74 degrees    QRS Count 11 beats    Q Onset 218 ms    P Onset 142 ms    P Offset 195 ms    T Offset 424 ms    QTC Fredericia 418  ms   Troponin, High Sensitivity, 1 Hour   Result Value Ref Range    Troponin I, High Sensitivity 5 0 - 20 ng/L   Urinalysis with Reflex Culture and Microscopic   Result Value Ref Range    Color, Urine Light-Yellow Light-Yellow, Yellow, Dark-Yellow    Appearance, Urine Clear Clear    Specific Gravity, Urine >1.050 (N) 1.005 - 1.035    pH, Urine 6.0 5.0, 5.5, 6.0, 6.5, 7.0, 7.5, 8.0    Protein, Urine 30 (1+) (A) NEGATIVE, 10 (TRACE), 20 (TRACE) mg/dL    Glucose, Urine OVER (4+) (A) Normal mg/dL    Blood, Urine NEGATIVE NEGATIVE    Ketones, Urine 100 (3+) (A) NEGATIVE mg/dL    Bilirubin, Urine NEGATIVE NEGATIVE    Urobilinogen, Urine 2 (1+) (A) Normal mg/dL    Nitrite, Urine NEGATIVE NEGATIVE    Leukocyte Esterase, Urine NEGATIVE NEGATIVE   Extra Urine Gray Tube   Result Value Ref Range    Extra Tube Hold for add-ons.    Urinalysis Microscopic   Result Value Ref Range    WBC, Urine NONE 1-5, NONE /HPF    RBC, Urine NONE NONE, 1-2, 3-5 /HPF    Squamous Epithelial Cells, Urine 1-9 (SPARSE) Reference range not established. /HPF    Bacteria, Urine 1+ (A) NONE SEEN /HPF   POCT GLUCOSE   Result Value Ref Range    POCT Glucose 221 (H) 74 - 99 mg/dL   POCT GLUCOSE   Result Value Ref Range    POCT Glucose 233 (H) 74 - 99 mg/dL   POCT GLUCOSE   Result Value Ref Range    POCT Glucose 164 (H) 74 - 99 mg/dL   Comprehensive metabolic panel   Result Value Ref Range    Glucose 156 (H) 74 - 99 mg/dL    Sodium 137 136 - 145 mmol/L    Potassium 4.0 3.5 - 5.3 mmol/L    Chloride 100 98 - 107 mmol/L    Bicarbonate 31 21 - 32 mmol/L    Anion Gap 10 10 - 20 mmol/L    Urea Nitrogen 12 6 - 23 mg/dL    Creatinine 0.67 0.50 - 1.30 mg/dL    eGFR >90 >60 mL/min/1.73m*2    Calcium 9.5 8.6 - 10.3 mg/dL    Albumin 4.0 3.4 - 5.0 g/dL    Alkaline Phosphatase 65 33 - 136 U/L    Total Protein 6.6 6.4 - 8.2 g/dL    AST 11 9 - 39 U/L    Bilirubin, Total 0.7 0.0 - 1.2 mg/dL    ALT 6 (L) 10 - 52 U/L   CBC and Auto Differential   Result Value Ref Range    WBC  10.5 4.4 - 11.3 x10*3/uL    nRBC 0.0 0.0 - 0.0 /100 WBCs    RBC 4.69 4.50 - 5.90 x10*6/uL    Hemoglobin 14.9 13.5 - 17.5 g/dL    Hematocrit 42.7 41.0 - 52.0 %    MCV 91 80 - 100 fL    MCH 31.8 26.0 - 34.0 pg    MCHC 34.9 32.0 - 36.0 g/dL    RDW 13.2 11.5 - 14.5 %    Platelets 231 150 - 450 x10*3/uL    Neutrophils % 77.9 40.0 - 80.0 %    Immature Granulocytes %, Automated 0.3 0.0 - 0.9 %    Lymphocytes % 11.8 13.0 - 44.0 %    Monocytes % 9.3 2.0 - 10.0 %    Eosinophils % 0.4 0.0 - 6.0 %    Basophils % 0.3 0.0 - 2.0 %    Neutrophils Absolute 8.16 (H) 1.60 - 5.50 x10*3/uL    Immature Granulocytes Absolute, Automated 0.03 0.00 - 0.50 x10*3/uL    Lymphocytes Absolute 1.24 0.80 - 3.00 x10*3/uL    Monocytes Absolute 0.97 (H) 0.05 - 0.80 x10*3/uL    Eosinophils Absolute 0.04 0.00 - 0.40 x10*3/uL    Basophils Absolute 0.03 0.00 - 0.10 x10*3/uL   Magnesium   Result Value Ref Range    Magnesium 2.08 1.60 - 2.40 mg/dL   POCT GLUCOSE   Result Value Ref Range    POCT Glucose 142 (H) 74 - 99 mg/dL       ECG 12 lead    Result Date: 11/26/2024  Normal sinus rhythm Early repolarization Normal ECG When compared with ECG of 11-APR-2024 13:20, Vent. rate has decreased BY  43 BPM    CT abdomen pelvis w IV contrast    Result Date: 11/25/2024  STUDY: CT Abdomen and Pelvis with IV Contrast; 11/25/2024 5:21 pm INDICATION: Vomiting.  Confusion. COMPARISON: None Available. ACCESSION NUMBER(S): [id number] ORDERING CLINICIAN: PARAMJIT WALTER TECHNIQUE: CT of the abdomen and pelvis was performed.  Contiguous axial images were obtained at 3 mm slice thickness through the abdomen and pelvis. Coronal and sagittal reconstructions at 3 mm slice thickness were performed.  Omnipaque 350, 100 mL was administered intravenously. FINDINGS: LOWER CHEST: No cardiomegaly.  No pericardial effusion.  Lung bases demonstrate chronic and emphysematous changes.  There may be trace right pleural fluid.  ABDOMEN:  LIVER: No hepatomegaly.  Smooth surface contour.   Normal attenuation.  BILE DUCTS: No intrahepatic or extrahepatic biliary ductal dilatation.  GALLBLADDER: The gallbladder is surgically absent. STOMACH: Is mild wall thickening of the stomach.  Please correlate for gastritis.  PANCREAS: No masses or ductal dilatation.  SPLEEN: No splenomegaly or focal splenic lesion.  ADRENAL GLANDS: There is left adrenal nodule measuring 3.2 x 2.6 cm measuring 45 Hounsfield units.  There is a smaller right adrenal nodule measuring 2.8 x 1.3 cm measuring 39 Hounsfield units.  KIDNEYS AND URETERS: Kidneys are normal in size and location.  There is a 9 mm nonobstructive calculus in the lower pole of the left kidney.  There is also a 6 mm calculus in the right renal pelvis without hydronephrosis.  There is small left renal cyst.  PELVIS:  BLADDER: No abnormalities identified.  REPRODUCTIVE ORGANS: No abnormalities identified.  Prostate gland measures 4.9 cm.  BOWEL: There is mild to moderate colonic stool.  There is wall thickening in the descending and sigmoid colon compatible with colitis with scattered diverticula. VESSELS: No abnormalities identified.  Abdominal aorta is normal in caliber.  PERITONEUM/RETROPERITONEUM/LYMPH NODES: No free fluid.  No pneumoperitoneum. No lymphadenopathy.  ABDOMINAL WALL: No abnormalities identified. SOFT TISSUES: No abnormalities identified.  BONES: No acute fracture or aggressive osseous lesion.  There is disc space narrowing and posterior disc osteophyte formation and disc protrusion at L5-S1.    Mild wall thickening of the descending sigmoid colon suggesting component of colitis.  Scattered diverticula. Mild wall thickening of the stomach suggesting gastritis. Nonobstructive bilateral renal calculi with 6 mm calculus in the right renal pelvis. Indeterminate bilateral adrenal nodules.  Findings would better evaluated with MRI or dynamic contrast enhanced CT.  Findings appear stable. Trace right pleural fluid.  Significant degenerative changes at  L5-S1. Signed by Puma Kim DO    CT head wo IV contrast    Result Date: 11/25/2024  Interpreted By:  Мария Linder, STUDY: CT HEAD WO IV CONTRAST;  11/25/2024 3:41 pm   INDICATION: Signs/Symptoms:confusion.   COMPARISON: None   ACCESSION NUMBER(S): [id number]   ORDERING CLINICIAN: PARAMJIT WALTER   TECHNIQUE: Examination was performed in the axial plane using soft tissue and bone algorithm.   FINDINGS: INTRACRANIAL: There is prominence of the ventricular system and cerebral sulci consistent with cerebral atrophy. There are periventricular hypodensities consistent with  mild small vessel disease. No mass or mass effect is identified. There is no hemorrhage or subdural fluid collection. There is no acute infarct.     EXTRACRANIAL: There is moderate mucosal thickening of the frontal sinuses and ethmoid air cells.       No acute intracranial pathology.   MACRO: None   Signed by: Мария Linder 11/25/2024 3:49 PM Dictation workstation:   [id number]    XR chest 1 view    Result Date: 11/25/2024  Interpreted By:  Jus Martinez, STUDY: XR CHEST 1 VIEW;  11/25/2024 2:12 pm   INDICATION: Signs/Symptoms:confusion, cough.     COMPARISON: 04/11/2024   ACCESSION NUMBER(S): [id number]   ORDERING CLINICIAN: PARAMJIT WALTER   FINDINGS: AP portable view of the chest is obtained.  Limited exam due to portable nature. Magnified cardiac silhouette. An area of subpleural nodularity in the right upper lobe is similar to the prior exam and may represent scarring. Short-term follow-up or CT of the chest as clinically warranted.       An area of subpleural nodularity in the right upper lobe is similar to the prior exam and may represent scarring. Short-term follow-up or CT of the chest as clinically warranted.   MACRO: None   Signed by: Jus Martinez 11/25/2024 2:32 PM Dictation workstation:   [id number]        Assessment/Plan     Altered Mental Status, Diarrhea, Abnormal CT   CT a/p showing gastric and sigmoid wall  thickening. Possible this was some isolated infection. Symptoms have resolved. He has no abdominal pain at all. Need to r/o other causes of altered mentation per primary    -PPI daily. No melena. HH normal. No plans for inpatient endoscopy    -Recommend outpatient EGD and colonoscopy     I spent 30 minutes in the professional and overall care of this patient.

## 2024-11-26 NOTE — ASSESSMENT & PLAN NOTE
-in setting of acute illness  -denied fever, chills, Lactate normal, no leukocytosis  -A&O to self  -CT brain as above  -U/A and CXR as above  -neurology consulted per attending  -continue to monitor

## 2024-11-26 NOTE — PROGRESS NOTES
[facility]. [facility], sees [facility] Sood, [facility], pt declines transfer to them. Pt does not have LW or HPOA, accepting of blank paperwork however and agreeable to let this worker assist him in completing the documents. Therapy ordered, pt anticipating he will not have any needs at dc, normally indpendent for ambulation or uses a scooter for longer distances.     11/26/24 1209   Discharge Planning   Living Arrangements Alone   Support Systems Family members   Type of Residence Private residence   Do you have animals or pets at home? No   Who is requesting discharge planning? Provider   Home or Post Acute Services None   Expected Discharge Disposition Home   Financial Resource Strain   How hard is it for you to pay for the very basics like food, housing, medical care, and heating? Not very   Housing Stability   In the last 12 months, was there a time when you were not able to pay the mortgage or rent on time? N   At any time in the past 12 months, were you homeless or living in a shelter (including now)? N   Transportation Needs   In the past 12 months, has lack of transportation kept you from medical appointments or from getting medications? no   In the past 12 months, has lack of transportation kept you from meetings, work, or from getting things needed for daily living? No

## 2024-11-26 NOTE — PROGRESS NOTES
Physical Therapy    Physical Therapy Evaluation    Patient Name: Aaron Shaw  MRN: [id number]  Department: [address]  Room: [facility]  Today's Date: 11/26/2024   Time Calculation  Start Time: 1335  Stop Time: 1352  Time Calculation (min): 17 min    Assessment/Plan   PT Assessment  PT Assessment Results: Decreased strength, Decreased endurance, Impaired balance, Decreased mobility, Decreased coordination, Decreased cognition, Impaired judgement, Decreased safety awareness, Impaired sensation  Rehab Prognosis: Good  Barriers to Discharge: unsteady gait, mod A for LOB, pt lives alone  Evaluation/Treatment Tolerance: Patient limited by fatigue  Medical Staff Made Aware: Yes  Strengths: Premorbid level of function  Barriers to Participation: Comorbidities, Insight into problems  End of Session Communication: Bedside nurse  Assessment Comment: pt with decreased functional mobility, increased weakness, impaired tolerance to activity, and poor safety awareness. pt is functioning below his baseline and will benefit from continued skilled therapy service to improve his functional performance and maximize safety.  End of Session Patient Position: Bed, 3 rail up, Alarm on (needs in reach)  IP OR SWING BED PT PLAN  Inpatient or Swing Bed: Inpatient  PT Plan  Treatment/Interventions: Bed mobility, Transfer training, Gait training, Balance training, Strengthening, Endurance training, Range of motion, Therapeutic activity, Therapeutic exercise  PT Plan: Ongoing PT  PT Frequency: 3 times per week  PT Discharge Recommendations: Moderate intensity level of continued care  Equipment Recommended upon Discharge: Wheeled walker  PT Recommended Transfer Status: Assist x1  PT - OK to Discharge: Yes    Subjective   General Visit Information:  General  Reason for Referral: Impaired Mobility  Referred By: Neva Stoddard MD  Past Medical History Relevant to Rehab: COPD, chronic pancreatitis, DJD, DM2, diabetic neuropathy, HLD, HTN, SSC of  skin, tobacco abuse, alcohol abuse, illicit drug abuse, pulmonary nodule, DJD, and low back pain  Family/Caregiver Present: No  Co-Treatment: OT  Co-Treatment Reason: To maximize safety and participation; pt here with AMS  Prior to Session Communication: Bedside nurse  Patient Position Received: Bed, 3 rail up, Alarm off, not on at start of session  Preferred Learning Style: verbal, visual  General Comment: 69 y.o. male presenting with abdominal pain and AMS.  In ED he had Hypomagnesemia, CT a/p showing gastric and sigmoid wall thickening.  Home Living:  Home Living  Type of Home: Apartment  Lives With: Alone  Home Adaptive Equipment: Scooter  Home Layout: One level  Home Access: Ramped entrance  Bathroom Shower/Tub: Tub/shower unit  Bathroom Toilet: Standard  Bathroom Equipment: Grab bars in shower, Shower chair with back  Bathroom Accessibility: main floor  Home Living Comments: Uses oxygen PRN; has portable pack.  Owns van with lift for scooter  Prior Level of Function:  Prior Function Per Pt/Caregiver Report  Level of [place]: Independent with ADLs and functional transfers, Independent with homemaking with ambulation  ADL Assistance: Independent  Homemaking Assistance: Independent  Ambulatory Assistance: Independent (no device within apartment, uses scooter for community distances)  Vocational: Part time employment (shuttle service/)  Precautions:  Precautions  Hearing/Visual Limitations: reading glasses  Medical Precautions: Fall precautions, Oxygen therapy device and L/min (2L O2 via NC)     Vital Signs (Past 2hrs)        Date/Time Vitals Session Patient Position Pulse Resp SpO2 BP MAP (mmHg)    11/26/24 1500 --  --  89  20  90 %  111/71  81                   Vital Signs Comment:  bpm with activity     Objective   Pain:  Pain Assessment  Pain Assessment: 0-10  0-10 (Numeric) Pain Score: 6  Pain Type: Chronic pain  Pain Location: Back  Pain Interventions: Repositioned, Ambulation/increased  activity  Response to Interventions: No change in pain  Cognition:  Cognition  Overall Cognitive Status: Impaired  Orientation Level: Oriented X4  Following Commands: Follows one step commands with increased time  Safety Judgment: Decreased awareness of need for safety precautions  Cognition Comments: impulsive with poor safety awareness    General Assessments:  General Observation  General Observation: tremulous B UEs    Activity Tolerance  Endurance: Decreased tolerance for upright activites  Activity Tolerance Comments: fair, fatigues easily    Sensation  Sensation Comment: neuropathy B feet, radiating pain from back down B LEs, none currently    Strength  Strength Comments: LE strength appears equal bilaterlaly, 4-/5  Coordination  Coordination Comment: decreased rate and accuracy of movement, difficulty maintaining a straight pathway while ambulating    Postural Control  Posture Comment: rounded shoudlers, forward head posture    Static Sitting Balance  Static Sitting-Balance Support: Feet supported  Static Sitting-Level of Assistance: Close supervision  Static Sitting-Comment/Number of Minutes: good balance seated on EOB    Static Standing Balance  Static Standing-Balance Support: No upper extremity supported  Static Standing-Level of Assistance: Minimum assistance  Static Standing-Comment/Number of Minutes: fair+ static standing balance  Dynamic Standing Balance  Dynamic Standing-Level of Assistance: Minimum assistance, Moderate assistance  Dynamic Standing-Comments: fair- balance with ambulation, unsteady  Functional Assessments:  ADL  ADL's Addressed: No    Bed Mobility  Bed Mobility: Yes  Bed Mobility 1  Bed Mobility 1: Supine to sitting  Level of Assistance 1: Close supervision  Bed Mobility Comments 1: HOB elevated and use of bed rail to sit on EOB.  Bed Mobility 2  Bed Mobility  2: Sitting to supine  Level of Assistance 2: Close supervision  Bed Mobility Comments 2: HOB flat, increased effort to lift B  LEs into the bed    Transfers  Transfer: Yes  Transfer 1  Transfer From 1: Bed to  Transfer to 1: Stand  Technique 1: Sit to stand  Transfer Level of Assistance 1: Contact guard  Trials/Comments 1: CGA for balance upon initial stand  Transfers 2  Transfer From 2: Stand to  Transfer to 2: Bed  Technique 2: Stand to sit  Transfer Level of Assistance 2: Contact guard  Trials/Comments 2: decreased eccentric control in sitting on EOB    Ambulation/Gait Training  Ambulation/Gait Training Performed: Yes  Ambulation/Gait Training 1  Surface 1: Level tile  Device 1: No device  Assistance 1: Minimum assistance, Moderate assistance  Quality of Gait 1: Narrow base of support, Decreased step length, Scissoring  Comments/Distance (ft) 1: 25'x2, no assistive device, slow bon, narrow ADDIE with occasional adducting/scissoring gait, LOB with mod A to recover.    Extremity/Trunk Assessments:  RLE   RLE :  (grossly WFL)  LLE   LLE :  (grossly WFL)  Outcome Measures:  [facility] Basic Mobility  Turning from your back to your side while in a flat bed without using bedrails: A little  Moving from lying on your back to sitting on the side of a flat bed without using bedrails: A little  Moving to and from bed to chair (including a wheelchair): A little  Standing up from a chair using your arms (e.g. wheelchair or bedside chair): A little  To walk in hospital room: A lot  Climbing 3-5 steps with railing: A lot  Basic Mobility - Total Score: 16    Encounter Problems       Encounter Problems (Active)       PT STG Problem       Patient will transfer supine to sit and sit to supine with independent assist to facilitate mobility. (Progressing)       Start:  11/26/24    Expected End:  12/22/24            Patient will transfer sit to stand and stand to sit with independent assist to facilitate mobility. (Progressing)       Start:  11/26/24    Expected End:  12/22/24            Patient will transfer bed to chair and chair to bed with independent  assist to facilitate mobility. (Progressing)       Start:  11/26/24    Expected End:  12/22/24            Patient will amb 150 feet rolling walker device including two turns on even surface with modified independent to assist to facilitate safe mobility.   (Progressing)       Start:  11/26/24    Expected End:  12/22/24            Patient will increase strength in B LE by half grade throughout to improve functional mobility.  (Progressing)       Start:  11/26/24    Expected End:  12/22/24                   Education Documentation  Mobility Training, taught by Shey Andre PT at 11/26/2024  3:25 PM.  Learner: Patient  Readiness: Acceptance  Method: Explanation  Response: Verbalizes Understanding    Education Comments  No comments found.

## 2024-11-26 NOTE — ASSESSMENT & PLAN NOTE
-hx of pancreatitis  -no Leukocytosis, Lactate negative  -CT abdomen as above  -received   -NPO diet  -IVF started  -continue PTA PPI  -GI consulted  -consider general surgery consulted as needed  -supportive care  -continue to monitor

## 2024-11-26 NOTE — CONSULTS
Inpatient consult to Neurology  Consult performed by: Hui Palacios MD  Consult ordered by: Neva Stoddard MD          History Of Present Illness  Aaron Shaw is a 69 y.o. male presenting with abdominal pain and AMS. Neurology consulted for AMS.    Pt with hx of COPD, chronic pancreatitis, DM, HTN presenting with abdominal pain, nausea. Was oriented to self at the time of admission. CT scan shows mild thickening of descending sigmoid possible colitis.     Pt seen this am. Reports doing well. He is oriented x4 now.     His CTH was personally reviewed and was unremarkable.     Past Medical History  Past Medical History:   Diagnosis Date    COPD (chronic obstructive pulmonary disease) (Multi)     Diabetes mellitus (Multi)     GERD (gastroesophageal reflux disease)     Hypertension     Jaundice     Substance abuse (Multi)      Surgical History  No past surgical history on file.  Social History  Social History     Tobacco Use    Smoking status: Former     Current packs/day: 0.00     Types: Cigarettes     Quit date: 3/28/2024     Years since quittin.6     Passive exposure: Past   Substance Use Topics    Alcohol use: Not Currently    Drug use: Not Currently     Allergies  Patient has no known allergies.  Medications Prior to Admission   Medication Sig Dispense Refill Last Dose/Taking    ascorbic acid (Vitamin C) 500 mg tablet Take 1 tablet (500 mg) by mouth once daily.       atorvastatin (Lipitor) 40 mg tablet Take 1 tablet (40 mg) by mouth once daily.       azithromycin (Zithromax) 250 mg tablet Take 1 tablet (250 mg) by mouth once every 24 hours. 5 tablet 0     fluticasone (Flonase) 50 mcg/actuation nasal spray Administer 1 spray into each nostril once daily. Shake gently. Before first use, prime pump. After use, clean tip and replace cap.       glipiZIDE (Glucotrol) 5 mg tablet Take 1 tablet (5 mg) by mouth 2 times a day before meals.       lisinopril 5 mg tablet Take 1 tablet (5 mg) by mouth once daily.        magnesium lactate CR (Magtab) 84 mg ER tablet Take 1 tablet (84 mg) by mouth 2 times a day. With meals       metFORMIN (Glucophage) 1,000 mg tablet Take 1 tablet (1,000 mg) by mouth 2 times a day with meals.       pantoprazole (ProtoNix) 20 mg EC tablet Take 1 tablet (20 mg) by mouth once daily in the morning. Take before meals. Do not crush, chew, or split.       pregabalin (Lyrica) 300 mg capsule Take 1 capsule (300 mg) by mouth 2 times a day.       pregabalin (Lyrica) 300 mg capsule Take 1 capsule (300 mg) by mouth 2 times a day.       tadalafil 20 mg tablet Take 1 tablet (20 mg) by mouth once daily as needed for erectile dysfunction.       vitamin E 180 mg (400 unit) capsule Take 1 capsule (400 Units) by mouth once daily.          Review of Systems  Neurological Exam  Physical Exam  MENTAL STATUS:  General appearance: in NAD  Orientation: [place] to self, time, place and condition   Language: Expression, repetition, naming, comprehension intact.   Concentration: Intact  Fund of knowledge: Appropriate    CRANIAL NERVES:  - Fundoscopic exam: Deferred   - II/III: PERRL  - II:  Visual fields intact to confrontation bilaterally   - III, IV, VI: EOMI to pursuit without nystagmus  - V: V1-V3 sensation intact bilaterally  - VII: Face muscles symmetric with smile and eye closure  - VIII: Intact to finger rub  - IX, X: Palate elevated symmetrically bilaterally, no hoarseness  - XI: 5/5 strength on shoulder shrugging bilaterally  - XII: Tongue midline without atrophy or fasciculation    MOTOR: Tone and bulk normal in all extremities  STRENGTH: 5/5 strength tested proximally and distally in BUE and BLE     REFLEXES: R L  Biceps   +2 +2  Brachioradialis +2 +2  Patellar   +2 +2  Achilles   +1 +1  Plantar   Mute mute   No clonus, frontal release signs or other pathologic reflexes present.     COORDINATION: Intact on finger to nose bl, intact on heel to shin bl, ANABEL intact bl  SENSORY: Intact to light touch in BUE and BLE  GAIT:  "deferred       Last Recorded Vitals  Blood pressure 146/68, pulse 90, temperature 36 °C (96.8 °F), temperature source Temporal, resp. rate 20, height 1.829 m (6' 0.01\"), weight 77.2 kg (170 lb 3.1 oz), SpO2 96%.    Relevant Results                    Ashleigh Coma Scale  Best Eye Response: Spontaneous  Best Verbal Response: Oriented  Best Motor Response: Follows commands  Ashleigh Coma Scale Score: 15                 I have personally reviewed the following imaging results ECG 12 lead    Result Date: 11/26/2024  Normal sinus rhythm Early repolarization Normal ECG When compared with ECG of 11-APR-2024 13:20, Vent. rate has decreased BY  43 BPM    CT abdomen pelvis w IV contrast    Result Date: 11/25/2024  STUDY: CT Abdomen and Pelvis with IV Contrast; 11/25/2024 5:21 pm INDICATION: Vomiting.  Confusion. COMPARISON: None Available. ACCESSION NUMBER(S): [id number] ORDERING CLINICIAN: PARAMJIT WALTER TECHNIQUE: CT of the abdomen and pelvis was performed.  Contiguous axial images were obtained at 3 mm slice thickness through the abdomen and pelvis. Coronal and sagittal reconstructions at 3 mm slice thickness were performed.  Omnipaque 350, 100 mL was administered intravenously. FINDINGS: LOWER CHEST: No cardiomegaly.  No pericardial effusion.  Lung bases demonstrate chronic and emphysematous changes.  There may be trace right pleural fluid.  ABDOMEN:  LIVER: No hepatomegaly.  Smooth surface contour.  Normal attenuation.  BILE DUCTS: No intrahepatic or extrahepatic biliary ductal dilatation.  GALLBLADDER: The gallbladder is surgically absent. STOMACH: Is mild wall thickening of the stomach.  Please correlate for gastritis.  PANCREAS: No masses or ductal dilatation.  SPLEEN: No splenomegaly or focal splenic lesion.  ADRENAL GLANDS: There is left adrenal nodule measuring 3.2 x 2.6 cm measuring 45 Hounsfield units.  There is a smaller right adrenal nodule measuring 2.8 x 1.3 cm measuring 39 Hounsfield units.  KIDNEYS AND " URETERS: Kidneys are normal in size and location.  There is a 9 mm nonobstructive calculus in the lower pole of the left kidney.  There is also a 6 mm calculus in the right renal pelvis without hydronephrosis.  There is small left renal cyst.  PELVIS:  BLADDER: No abnormalities identified.  REPRODUCTIVE ORGANS: No abnormalities identified.  Prostate gland measures 4.9 cm.  BOWEL: There is mild to moderate colonic stool.  There is wall thickening in the descending and sigmoid colon compatible with colitis with scattered diverticula. VESSELS: No abnormalities identified.  Abdominal aorta is normal in caliber.  PERITONEUM/RETROPERITONEUM/LYMPH NODES: No free fluid.  No pneumoperitoneum. No lymphadenopathy.  ABDOMINAL WALL: No abnormalities identified. SOFT TISSUES: No abnormalities identified.  BONES: No acute fracture or aggressive osseous lesion.  There is disc space narrowing and posterior disc osteophyte formation and disc protrusion at L5-S1.    Mild wall thickening of the descending sigmoid colon suggesting component of colitis.  Scattered diverticula. Mild wall thickening of the stomach suggesting gastritis. Nonobstructive bilateral renal calculi with 6 mm calculus in the right renal pelvis. Indeterminate bilateral adrenal nodules.  Findings would better evaluated with MRI or dynamic contrast enhanced CT.  Findings appear stable. Trace right pleural fluid.  Significant degenerative changes at L5-S1. Signed by Puma Kim,     CT head wo IV contrast    Result Date: 11/25/2024  Interpreted By:  Мария Linder, STUDY: CT HEAD WO IV CONTRAST;  11/25/2024 3:41 pm   INDICATION: Signs/Symptoms:confusion.   COMPARISON: None   ACCESSION NUMBER(S): [id number]   ORDERING CLINICIAN: PARAMJIT WALTER   TECHNIQUE: Examination was performed in the axial plane using soft tissue and bone algorithm.   FINDINGS: INTRACRANIAL: There is prominence of the ventricular system and cerebral sulci consistent with cerebral  atrophy. There are periventricular hypodensities consistent with  mild small vessel disease. No mass or mass effect is identified. There is no hemorrhage or subdural fluid collection. There is no acute infarct.     EXTRACRANIAL: There is moderate mucosal thickening of the frontal sinuses and ethmoid air cells.       No acute intracranial pathology.   MACRO: None   Signed by: Мария Linder 11/25/2024 3:49 PM Dictation workstation:   [id number]    XR chest 1 view    Result Date: 11/25/2024  Interpreted By:  Jus Martinez, STUDY: XR CHEST 1 VIEW;  11/25/2024 2:12 pm   INDICATION: Signs/Symptoms:confusion, cough.     COMPARISON: 04/11/2024   ACCESSION NUMBER(S): [id number]   ORDERING CLINICIAN: PARAMJIT WALTER   FINDINGS: AP portable view of the chest is obtained.  Limited exam due to portable nature. Magnified cardiac silhouette. An area of subpleural nodularity in the right upper lobe is similar to the prior exam and may represent scarring. Short-term follow-up or CT of the chest as clinically warranted.       An area of subpleural nodularity in the right upper lobe is similar to the prior exam and may represent scarring. Short-term follow-up or CT of the chest as clinically warranted.   MACRO: None   Signed by: Jus Martinez 11/25/2024 2:32 PM Dictation workstation:   [id number]  .      Assessment/Plan   Assessment & Plan  Altered mental status, unspecified altered mental status type    Abdominal pain    Hypomagnesemia    Nausea    Weakness    Type 2 diabetes mellitus with circulatory disorder, without long-term current use of insulin    Hypertension    Tobacco abuse    Pulmonary emphysema (Multi)      Aaron Shaw is a 69 y.o. male presenting with abdominal pain and AMS. Neurology consulted for AMS in the setting of abdominal pain. He is now oriented x4. His abdominal pain is also improved.     Suspect AMS 2/2 delirium in the setting of pain.  No further neuro evaluation is indicated.    Will sign off.         I spent 60 minutes in the professional and overall care of this patient.      Hui Palacios MD

## 2024-11-26 NOTE — ASSESSMENT & PLAN NOTE
-Mag level 1.27 on admission  -EKG as above  -received 2g IV mag in ED  -additional 2G IV mag ordered  -continue to monitor  -replace and monitor  -tele monitoring

## 2024-11-26 NOTE — CARE PLAN
The patient's goals for the shift include      The clinical goals for the shift include IV fluids, replace MG, stable VS, safety    Over the shift, the patient did make progress toward the following goals.       Problem: Pain - Adult  Goal: Verbalizes/displays adequate comfort level or baseline comfort level  Outcome: Progressing     Problem: Safety - Adult  Goal: Free from fall injury  Outcome: Progressing     Problem: Discharge Planning  Goal: Discharge to home or other facility with appropriate resources  Outcome: Progressing     Problem: Chronic Conditions and Co-morbidities  Goal: Patient's chronic conditions and co-morbidity symptoms are monitored and maintained or improved  Outcome: Progressing     Problem: Diabetes  Goal: Achieve decreasing blood glucose levels by end of shift  Outcome: Progressing     Problem: Diabetes  Goal: Increase stability of blood glucose readings by end of shift  Outcome: Progressing     Problem: Diabetes  Goal: Maintain glucose levels >70mg/dl to <250mg/dl throughout shift  Outcome: Progressing     Problem: Diabetes  Goal: Vital signs within normal range for age by end of shift  Outcome: Progressing     Problem: Diabetes  Goal: Increase self care and/or family involovement by end of shift  Outcome: Progressing

## 2024-11-26 NOTE — PROGRESS NOTES
Occupational Therapy    Evaluation    Patient Name: Aaron Shaw  MRN: [id number]  Department: [address]  Room: [facility]458-A  Today's Date: 11/26/2024  Time Calculation  Start Time: 1334  Stop Time: 1352  Time Calculation (min): 18 min        Assessment:  OT Assessment: pt presents with generalized weakness, confusion/reduced safety and decreased balance which impedes ADL performance. pt would benefit from skilled OT services to address these deficits and to facilitate highest level of independence.  Prognosis: Fair  Barriers to Discharge: Decreased caregiver support  Evaluation/Treatment Tolerance: Patient limited by fatigue  Medical Staff Made Aware: Yes  End of Session Communication: Bedside nurse  End of Session Patient Position: Bed, 3 rail up, Alarm on  OT Assessment Results: Decreased ADL status, Decreased safe judgment during ADL, Decreased cognition, Decreased endurance, Decreased functional mobility  Prognosis: Fair  Barriers to Discharge: Decreased caregiver support  Evaluation/Treatment Tolerance: Patient limited by fatigue  Medical Staff Made Aware: Yes  Strengths: Attitude of self, Ability to acquire knowledge  Barriers to Participation: Comorbidities  Plan:  Treatment Interventions: ADL retraining, Functional transfer training, UE strengthening/ROM, Endurance training, Equipment evaluation/education, Compensatory technique education  OT Frequency: 4 times per week  OT Discharge Recommendations: Moderate intensity level of continued care  Equipment Recommended upon Discharge: Wheeled walker  OT Recommended Transfer Status: Assist of 1, Minimal assist  OT - OK to Discharge: Yes  Treatment Interventions: ADL retraining, Functional transfer training, UE strengthening/ROM, Endurance training, Equipment evaluation/education, Compensatory technique education    Subjective     General:  General  Reason for Referral: ADL impairment; 69 y.o. male presenting with abdominal pain and AMS.  In ED he had Hypomagnesemia,  CT a/p showing gastric and sigmoid wall thickening.  Past Medical History Relevant to Rehab: PMHX significant for COPD, chronic pancreatitis, DJD, DM2, diabetic neuropathy, HLD, HTN, SSC of skiin, tobacco abuse, alcohol abuse, illicit drug abuse, pulmonary nodule, DJD, and low back pain  Family/Caregiver Present: No  Co-Treatment: PT  Co-Treatment Reason: To maximize safety and participation; pt here with AMS  Prior to Session Communication: Bedside nurse  Patient Position Received: Bed, 3 rail up, Alarm off, not on at start of session  Preferred Learning Style: verbal, visual  General Comment: pt agreeable with therapy and cooperative.  mildly impulsive however pt appears to be less confused than this morning (RN reporting he was attempting to turn the TV on with his car keys)  Precautions:  Medical Precautions: Fall precautions, Oxygen therapy device and L/min (2L)      Pain:  Pain Assessment  Pain Assessment: 0-10  0-10 (Numeric) Pain Score: 6  Pain Type: Chronic pain  Pain Location: Back  Pain Interventions: Repositioned, Prayers    Objective   Cognition:  Overall Cognitive Status: Impaired  Orientation Level: Oriented X4  Following Commands: Follows one step commands with increased time  Cognition Comments: Mildly impulsive, decreased awareness of deficits           Home Living:  Type of Home: Apartment  Lives With: Alone  Home Adaptive Equipment: Scooter  Home Layout: One level  Home Access: Ramped entrance  Bathroom Shower/Tub: Tub/shower unit  Bathroom Toilet: Standard  Bathroom Equipment: Grab bars in shower, Shower chair with back  Bathroom Accessibility: main floor  Home Living Comments: Uses oxygen PRN; has portable pack.  Owns van with lift for scooter  Prior Function:  Level of [place]: Independent with ADLs and functional transfers, Independent with homemaking with ambulation  ADL Assistance: Independent  Homemaking Assistance: Independent  Ambulatory Assistance: Independent (no device within  apartment, uses scooter for community distances)  Vocational: Part time employment (shuttle service/)     ADL:  Eating Assistance: Independent (anticipated)  Grooming Assistance: Minimal (pt performed hand washing while standing at sink unsupported.  pt requiring CGA with intermittent bouts of MIN A for steadying assist. stood at sink for ~2 minutes during task, min cues for sequencing of task)  Bathing Assistance: Moderate (anticipated)  UE Dressing Assistance: Minimal (anticipated)  LE Dressing Assistance: Moderate (anticipated)  Toileting Assistance with Device: Minimal (pt was able to manage clothing prior/after voiding while standing unsupported however required MIN A for standing balance.  pt was able to perform hygiene while seated via lateral weight shifting at Supervision)  ADL Comments: Limited by unsteadiness and decreased safety awareness  Activity Tolerance:  Endurance: Tolerates 10 - 20 min exercise with multiple rests  Bed Mobility/Transfers: Bed Mobility  Bed Mobility: Yes  Bed Mobility 1  Bed Mobility 1: Supine to sitting, Sitting to supine  Level of Assistance 1: Close supervision  Bed Mobility Comments 1: no physical assistance, performed with HOB elevated    Transfers  Transfer: Yes  Transfer 1  Technique 1: Sit to stand, Stand to sit  Transfer Level of Assistance 1: Contact guard  Trials/Comments 1: from edge of bed, fair control when ascending/descending; effortful  Transfers 2  Transfer to 2: Toilet  Technique 2: Stand pivot  Transfer Level of Assistance 2: Minimum assistance  Trials/Comments 2: standing pivot to toilet in bathroom w/o a device.  pt requiring MIN A for steady assist during pivot.  pt utilized grab bar to ascend/descent at CGA  Transfers 3  Transfer to 3: Bed  Technique 3: Stand pivot  Transfer Level of Assistance 3: Minimum assistance  Trials/Comments 3: Pt transferred back to bed at end of session after ambulating from bathroom w/o a device      Functional  Mobility:  Functional Mobility  Functional Mobility Performed: Yes  Functional Mobility 1  Surface 1: Level tile  Device 1: No device  Assistance 1: Minimum assistance, Moderate assistance  Comments 1: pt ambulated household distances including to/from bathroom and out into hallway without a device.  pt requiring consistent MIN A for steadying assist however with bouts of MOD A due to multiple episodes of LOB.  cues for safety and slowing down pace provided throughout.  Sitting Balance:  Static Sitting Balance  Static Sitting-Balance Support: Feet supported  Static Sitting-Level of Assistance: Close supervision  Standing Balance:  Static Standing Balance  Static Standing-Balance Support: No upper extremity supported  Static Standing-Level of Assistance: Minimum assistance      Vision:Vision - Basic Assessment  Current Vision: No visual deficits  Sensation:  Light Touch: No apparent deficits  Strength:  Strength Comments: BUE grossly 3+/5  Perception:  Inattention/Neglect: Appears intact  Coordination:  Movements are Fluid and Coordinated: Yes   Hand Function:  Gross Grasp: Functional  Coordination: Functional  Extremities: RUE   RUE : Within Functional Limits and LUE   LUE: Within Functional Limits    Outcome Measures:[facility] Daily Activity  Putting on and taking off regular lower body clothing: A lot  Bathing (including washing, rinsing, drying): A lot  Putting on and taking off regular upper body clothing: A little  Toileting, which includes using toilet, bedpan or urinal: A little  Taking care of personal grooming such as brushing teeth: A little  Eating Meals: None  Daily Activity - Total Score: 17        Education Documentation  Body Mechanics, taught by Eric Fry OT at 11/26/2024  2:15 PM.  Learner: Patient  Readiness: Acceptance  Method: Explanation, Demonstration  Response: Needs Reinforcement    ADL Training, taught by Eric Fry OT at 11/26/2024  2:15 PM.  Learner: Patient  Readiness:  Acceptance  Method: Explanation, Demonstration  Response: Needs Reinforcement    Education Comments  No comments found.        OP EDUCATION:       Goals:  Encounter Problems       Encounter Problems (Active)       ADLs       Patient with complete upper body dressing with supervision level of assistance donning and doffing all UE clothes while edge of bed  (Progressing)       Start:  11/26/24    Expected End:  12/26/24            Patient with complete lower body dressing with supervision level of assistance donning all LE clothes  with PRN adaptive equipment while edge of bed  (Progressing)       Start:  11/26/24    Expected End:  12/26/24            Patient will complete daily grooming tasks with supervision level of assistance and PRN adaptive equipment while standing. (Progressing)       Start:  11/26/24    Expected End:  12/26/24            Patient will complete toileting including hygiene clothing management/hygiene with supervision level of assistance and grab bars. (Progressing)       Start:  11/26/24    Expected End:  12/26/24               MOBILITY       Patient will perform Functional mobility max Household distances/Community Distances with supervision level of assistance and least restrictive device in order to improve safety and functional mobility. (Progressing)       Start:  11/26/24    Expected End:  12/26/24

## 2024-11-26 NOTE — H&P
History Of Present Illness  Aaron Shaw is a 69 y.o. male presenting with confussion, colitis, hypomagnesemiaaaaa, hx alcohool abuse ''when young'', smoker now 1 ppd advised to quit.     Past Medical History  Past Medical History:   Diagnosis Date    COPD (chronic obstructive pulmonary disease) (Multi)     Diabetes mellitus (Multi)     GERD (gastroesophageal reflux disease)     Hypertension     Jaundice     Substance abuse (Multi)        Surgical History  No past surgical history on file.     Social History  He reports that he quit smoking about 7 months ago. His smoking use included cigarettes. He has been exposed to tobacco smoke. He does not have any smokeless tobacco history on file. He reports that he does not currently use alcohol. He reports that he does not currently use drugs.    Family History  No family history on file.     Allergies  Patient has no known allergies.    Review of Systems   Constitutional:  Positive for activity change, appetite change and fatigue.   HENT: Negative.     Eyes: Negative.    Respiratory: Negative.     Cardiovascular: Negative.    Gastrointestinal:  Positive for diarrhea.   Endocrine: Negative.    Genitourinary: Negative.    Musculoskeletal: Negative.    Skin: Negative.    Allergic/Immunologic: Negative.    Neurological:  Positive for weakness.   Hematological: Negative.    Psychiatric/Behavioral:  Positive for confusion.         Physical Exam  Constitutional:       Appearance: Normal appearance.   HENT:      Head: Normocephalic and atraumatic.      Nose: Nose normal.   Eyes:      Extraocular Movements: Extraocular movements intact.      Conjunctiva/sclera: Conjunctivae normal.      Pupils: Pupils are equal, round, and reactive to light.   Cardiovascular:      Rate and Rhythm: Normal rate and regular rhythm.      Pulses: Normal pulses.      Heart sounds: Normal heart sounds.   Pulmonary:      Effort: Pulmonary effort is normal.      Breath sounds: Rhonchi present.  "  Abdominal:      General: Bowel sounds are normal.      Palpations: Abdomen is soft.   Musculoskeletal:         General: Normal range of motion.      Cervical back: Normal range of motion and neck supple.   Skin:     General: Skin is warm.   Neurological:      General: No focal deficit present.      Mental Status: He is alert and oriented to person, place, and time.   Psychiatric:         Mood and Affect: Mood normal.      Comments: Slightly agitated, flushed face          Last Recorded Vitals  Blood pressure 146/68, pulse 90, temperature 36 °C (96.8 °F), temperature source Temporal, resp. rate 20, height 1.829 m (6' 0.01\"), weight 77.2 kg (170 lb 3.1 oz), SpO2 96%.    Relevant Results             Assessment/Plan   Assessment & Plan  Altered mental status, unspecified altered mental status type    Abdominal pain    Hypomagnesemia    Nausea    Weakness    Type 2 diabetes mellitus with circulatory disorder, without long-term current use of insulin    Hypertension    Tobacco abuse    Pulmonary emphysema (Multi)      Lung congestion, cough, smoker advised to quit nicotine patch,colitis, better       I spent  minutes in the professional and overall care of this patient.      Neva Stoddard MD    "

## 2024-11-26 NOTE — H&P
"History Of Present Illness  Aaron Shaw is a 69 y.o. male with PMHX significant for COPD, chronic pancreatitis, DJD, DM2, diabetic neuropathy, HLD, HTN, SSC of skiin, tobacco abuse, alcohol abuse, illicit drug abuse, pulmonary nodule, DJD, and low back pain presented to the emergency department for mental status change and abdominal pain.  Patient stated that he has had abdominal pain for \"quite some time\". He stated pain is generalized, and he has had nausea, but denied vomiting.  He cannot tell me when he had last bowel movement, but denied diarrhea.  Patient is alert to self only at time of my exam.  Most of HPI is taken from chart and from son.  His son stated he works with his father.  He stated last Friday his father was not feeling well, and stated he had abdominal pain.  He stated he did not show up for his children's games last evening and then did not show up to work today.  He called his father and he sounded confused.  Son went to patient's apartment and found him on the couch trying to turn off TV with car keys.  Son stated his father was not making sense.  Patient denied fever, chills, chest pain, shortness of breath.      Past Medical History  Past Medical History:   Diagnosis Date    COPD (chronic obstructive pulmonary disease) (Multi)     Diabetes mellitus (Multi)     GERD (gastroesophageal reflux disease)     Hypertension     Jaundice     Substance abuse (Multi)        Surgical History  No past surgical history on file.     Social History  He reports that he quit smoking about 7 months ago. His smoking use included cigarettes. He has been exposed to tobacco smoke. He does not have any smokeless tobacco history on file. He reports that he does not currently use alcohol. He reports that he does not currently use drugs.    Family History  No family history on file.     Allergies  Patient has no known allergies.    Review of Systems   Constitutional:  Positive for activity change, appetite change " and fatigue.   HENT: Negative.     Eyes: Negative.    Respiratory: Negative.     Cardiovascular: Negative.    Gastrointestinal:  Positive for abdominal pain.   Endocrine: Negative.    Genitourinary: Negative.    Skin: Negative.    Neurological: Negative.    Hematological: Negative.    Psychiatric/Behavioral: Negative.          Physical Exam  Constitutional:       Appearance: He is ill-appearing.   HENT:      Head: Normocephalic and atraumatic.      Mouth/Throat:      Mouth: Mucous membranes are dry.      Pharynx: Oropharynx is clear.   Eyes:      Comments: +bilat bloodshot eyes   Cardiovascular:      Rate and Rhythm: Normal rate and regular rhythm.      Pulses: Normal pulses.   Pulmonary:      Breath sounds: Wheezing and rhonchi present.   Abdominal:      General: Abdomen is flat.      Palpations: Abdomen is soft.      Tenderness: There is abdominal tenderness.   Musculoskeletal:      Cervical back: Normal range of motion and neck supple.   Skin:     General: Skin is warm and dry.      Capillary Refill: Capillary refill takes less than 2 seconds.   Neurological:      Mental Status: He is disoriented.      Gait: Gait abnormal.   Psychiatric:         Mood and Affect: Mood normal.          Last Recorded Vitals  Blood pressure 152/81, pulse 89, temperature 36.8 °C (98.2 °F), temperature source Temporal, resp. rate 18, height 1.829 m (6'), weight 72.6 kg (160 lb), SpO2 (!) 92%.    Relevant Results  CT abdomen pelvis w IV contrast    Result Date: 11/25/2024  STUDY: CT Abdomen and Pelvis with IV Contrast; 11/25/2024 5:21 pm INDICATION: Vomiting.  Confusion. COMPARISON: None Available. ACCESSION NUMBER(S): [id number] ORDERING CLINICIAN: PARAMJIT WALTER TECHNIQUE: CT of the abdomen and pelvis was performed.  Contiguous axial images were obtained at 3 mm slice thickness through the abdomen and pelvis. Coronal and sagittal reconstructions at 3 mm slice thickness were performed.  Omnipaque 350, 100 mL was administered  intravenously. FINDINGS: LOWER CHEST: No cardiomegaly.  No pericardial effusion.  Lung bases demonstrate chronic and emphysematous changes.  There may be trace right pleural fluid.  ABDOMEN:  LIVER: No hepatomegaly.  Smooth surface contour.  Normal attenuation.  BILE DUCTS: No intrahepatic or extrahepatic biliary ductal dilatation.  GALLBLADDER: The gallbladder is surgically absent. STOMACH: Is mild wall thickening of the stomach.  Please correlate for gastritis.  PANCREAS: No masses or ductal dilatation.  SPLEEN: No splenomegaly or focal splenic lesion.  ADRENAL GLANDS: There is left adrenal nodule measuring 3.2 x 2.6 cm measuring 45 Hounsfield units.  There is a smaller right adrenal nodule measuring 2.8 x 1.3 cm measuring 39 Hounsfield units.  KIDNEYS AND URETERS: Kidneys are normal in size and location.  There is a 9 mm nonobstructive calculus in the lower pole of the left kidney.  There is also a 6 mm calculus in the right renal pelvis without hydronephrosis.  There is small left renal cyst.  PELVIS:  BLADDER: No abnormalities identified.  REPRODUCTIVE ORGANS: No abnormalities identified.  Prostate gland measures 4.9 cm.  BOWEL: There is mild to moderate colonic stool.  There is wall thickening in the descending and sigmoid colon compatible with colitis with scattered diverticula. VESSELS: No abnormalities identified.  Abdominal aorta is normal in caliber.  PERITONEUM/RETROPERITONEUM/LYMPH NODES: No free fluid.  No pneumoperitoneum. No lymphadenopathy.  ABDOMINAL WALL: No abnormalities identified. SOFT TISSUES: No abnormalities identified.  BONES: No acute fracture or aggressive osseous lesion.  There is disc space narrowing and posterior disc osteophyte formation and disc protrusion at L5-S1.    Mild wall thickening of the descending sigmoid colon suggesting component of colitis.  Scattered diverticula. Mild wall thickening of the stomach suggesting gastritis. Nonobstructive bilateral renal calculi with 6 mm  calculus in the right renal pelvis. Indeterminate bilateral adrenal nodules.  Findings would better evaluated with MRI or dynamic contrast enhanced CT.  Findings appear stable. Trace right pleural fluid.  Significant degenerative changes at L5-S1. Signed by Puma Kim DO    CT head wo IV contrast    Result Date: 11/25/2024  Interpreted By:  Мария Linder, STUDY: CT HEAD WO IV CONTRAST;  11/25/2024 3:41 pm   INDICATION: Signs/Symptoms:confusion.   COMPARISON: None   ACCESSION NUMBER(S): [id number]   ORDERING CLINICIAN: PARAMJIT WALTER   TECHNIQUE: Examination was performed in the axial plane using soft tissue and bone algorithm.   FINDINGS: INTRACRANIAL: There is prominence of the ventricular system and cerebral sulci consistent with cerebral atrophy. There are periventricular hypodensities consistent with  mild small vessel disease. No mass or mass effect is identified. There is no hemorrhage or subdural fluid collection. There is no acute infarct.     EXTRACRANIAL: There is moderate mucosal thickening of the frontal sinuses and ethmoid air cells.       No acute intracranial pathology.   MACRO: None   Signed by: Мария Linder 11/25/2024 3:49 PM Dictation workstation:   [id number]    XR chest 1 view    Result Date: 11/25/2024  Interpreted By:  Jus Martinez, STUDY: XR CHEST 1 VIEW;  11/25/2024 2:12 pm   INDICATION: Signs/Symptoms:confusion, cough.     COMPARISON: 04/11/2024   ACCESSION NUMBER(S): [id number]   ORDERING CLINICIAN: PARAMJIT WALTER   FINDINGS: AP portable view of the chest is obtained.  Limited exam due to portable nature. Magnified cardiac silhouette. An area of subpleural nodularity in the right upper lobe is similar to the prior exam and may represent scarring. Short-term follow-up or CT of the chest as clinically warranted.       An area of subpleural nodularity in the right upper lobe is similar to the prior exam and may represent scarring. Short-term follow-up or CT of the chest  as clinically warranted.   MACRO: None   Signed by: Jus Martinez 11/25/2024 2:32 PM Dictation workstation:   [id number]      Results for orders placed or performed during the hospital encounter of 11/25/24 (from the past 24 hours)   POCT GLUCOSE   Result Value Ref Range    POCT Glucose 273 (H) 74 - 99 mg/dL   CBC and Auto Differential   Result Value Ref Range    WBC 7.6 4.4 - 11.3 x10*3/uL    nRBC 0.0 0.0 - 0.0 /100 WBCs    RBC 4.83 4.50 - 5.90 x10*6/uL    Hemoglobin 15.3 13.5 - 17.5 g/dL    Hematocrit 44.2 41.0 - 52.0 %    MCV 92 80 - 100 fL    MCH 31.7 26.0 - 34.0 pg    MCHC 34.6 32.0 - 36.0 g/dL    RDW 13.0 11.5 - 14.5 %    Platelets 243 150 - 450 x10*3/uL    Neutrophils % 80.7 40.0 - 80.0 %    Immature Granulocytes %, Automated 0.3 0.0 - 0.9 %    Lymphocytes % 13.3 13.0 - 44.0 %    Monocytes % 5.4 2.0 - 10.0 %    Eosinophils % 0.0 0.0 - 6.0 %    Basophils % 0.3 0.0 - 2.0 %    Neutrophils Absolute 6.13 (H) 1.60 - 5.50 x10*3/uL    Immature Granulocytes Absolute, Automated 0.02 0.00 - 0.50 x10*3/uL    Lymphocytes Absolute 1.01 0.80 - 3.00 x10*3/uL    Monocytes Absolute 0.41 0.05 - 0.80 x10*3/uL    Eosinophils Absolute 0.00 0.00 - 0.40 x10*3/uL    Basophils Absolute 0.02 0.00 - 0.10 x10*3/uL   Comprehensive metabolic panel   Result Value Ref Range    Glucose 252 (H) 74 - 99 mg/dL    Sodium 134 (L) 136 - 145 mmol/L    Potassium 4.3 3.5 - 5.3 mmol/L    Chloride 98 98 - 107 mmol/L    Bicarbonate 26 21 - 32 mmol/L    Anion Gap 14 10 - 20 mmol/L    Urea Nitrogen 14 6 - 23 mg/dL    Creatinine 0.64 0.50 - 1.30 mg/dL    eGFR >90 >60 mL/min/1.73m*2    Calcium 10.2 8.6 - 10.3 mg/dL    Albumin 4.2 3.4 - 5.0 g/dL    Alkaline Phosphatase 71 33 - 136 U/L    Total Protein 7.1 6.4 - 8.2 g/dL    AST 9 9 - 39 U/L    Bilirubin, Total 0.9 0.0 - 1.2 mg/dL    ALT 6 (L) 10 - 52 U/L   Lipase   Result Value Ref Range    Lipase 5 (L) 9 - 82 U/L   Magnesium   Result Value Ref Range    Magnesium 1.27 (L) 1.60 - 2.40 mg/dL   Lactate   Result  Value Ref Range    Lactate 1.0 0.4 - 2.0 mmol/L   BLOOD GAS VENOUS FULL PANEL   Result Value Ref Range    POCT pH, Venous 7.43 7.33 - 7.43 pH    POCT pCO2, Venous 42 41 - 51 mm Hg    POCT pO2, Venous 44 35 - 45 mm Hg    POCT SO2, Venous 79 (H) 45 - 75 %    POCT Oxy Hemoglobin, Venous 75.6 (H) 45.0 - 75.0 %    POCT Hematocrit Calculated, Venous 46.0 41.0 - 52.0 %    POCT Sodium, Venous 133 (L) 136 - 145 mmol/L    POCT Potassium, Venous 4.3 3.5 - 5.3 mmol/L    POCT Chloride, Venous 96 (L) 98 - 107 mmol/L    POCT Ionized Calicum, Venous 1.30 1.10 - 1.33 mmol/L    POCT Glucose, Venous 281 (H) 74 - 99 mg/dL    POCT Lactate, Venous 1.2 0.4 - 2.0 mmol/L    POCT Base Excess, Venous 3.1 (H) -2.0 - 3.0 mmol/L    POCT HCO3 Calculated, Venous 27.9 (H) 22.0 - 26.0 mmol/L    POCT Hemoglobin, Venous 15.2 13.5 - 17.5 g/dL    POCT Anion Gap, Venous 13.0 10.0 - 25.0 mmol/L    Patient Temperature 37.0 degrees Celsius    FiO2 93 %   Troponin I, High Sensitivity, Initial   Result Value Ref Range    Troponin I, High Sensitivity 3 0 - 20 ng/L   Troponin, High Sensitivity, 1 Hour   Result Value Ref Range    Troponin I, High Sensitivity 5 0 - 20 ng/L   Urinalysis with Reflex Culture and Microscopic   Result Value Ref Range    Color, Urine Light-Yellow Light-Yellow, Yellow, Dark-Yellow    Appearance, Urine Clear Clear    Specific Gravity, Urine >1.050 (N) 1.005 - 1.035    pH, Urine 6.0 5.0, 5.5, 6.0, 6.5, 7.0, 7.5, 8.0    Protein, Urine 30 (1+) (A) NEGATIVE, 10 (TRACE), 20 (TRACE) mg/dL    Glucose, Urine OVER (4+) (A) Normal mg/dL    Blood, Urine NEGATIVE NEGATIVE    Ketones, Urine 100 (3+) (A) NEGATIVE mg/dL    Bilirubin, Urine NEGATIVE NEGATIVE    Urobilinogen, Urine 2 (1+) (A) Normal mg/dL    Nitrite, Urine NEGATIVE NEGATIVE    Leukocyte Esterase, Urine NEGATIVE NEGATIVE   Urinalysis Microscopic   Result Value Ref Range    WBC, Urine NONE 1-5, NONE /HPF    RBC, Urine NONE NONE, 1-2, 3-5 /HPF    Squamous Epithelial Cells, Urine 1-9  (SPARSE) Reference range not established. /HPF    Bacteria, Urine 1+ (A) NONE SEEN /HPF               Assessment/Plan   Assessment & Plan  Altered mental status, unspecified altered mental status type  -in setting of acute illness  -denied fever, chills, Lactate normal, no leukocytosis  -A&O to self  -CT brain as above  -U/A and CXR as above  -neurology consulted per attending  -continue to monitor  Hypomagnesemia  -Mag level 1.27 on admission  -EKG as above  -received 2g IV mag in ED  -additional 2G IV mag ordered  -continue to monitor  -replace and monitor  -tele monitoring  Abdominal pain  -hx of pancreatitis  -no Leukocytosis, Lactate negative  -CT abdomen as above  -received   -NPO diet  -IVF started  -continue PTA PPI  -GI consulted  -consider general surgery consulted as needed  -supportive care  -continue to monitor  Nausea  -Zofran prn  Weakness  -2/2 acute illness  -ambulated independently PTA  -fall precautions  Type 2 diabetes mellitus with circulatory disorder, without long-term current use of insulin  -holding PTA Metformin/Glucophage  -BG monitoring q4h and SSI  -continue to monitor  Hypertension  -stable  -resume PTA lisinopriil  -continue to monitor    Tobacco abuse  -current every day smoker  -smoking cessation  -nicotine patch ordered  Pulmonary emphysema (Multi)  -resume PTA inhalers  -continue to monitor           I spent 75 minutes in the professional and overall care of this patient.      Dipti Ascencio APRN-CNP, DNP

## 2024-11-26 NOTE — NURSING NOTE
Pt has just arrived to [facility], room 458.  Pt is alert to self only - name and birth date. Did not know where he was or what the date was.  Respirations even and unlabored on 3L O2 NC with no s/s of distress.  IV fluids brought up with pt.  Telemetry monitoring in place.  Aid obtaining VS.  Blood sugar 233 upon arrival to the floor.  Will be continuing to monitor.

## 2024-11-27 LAB
ANION GAP SERPL CALCULATED.3IONS-SCNC: 9 MMOL/L (ref 10–20)
BUN SERPL-MCNC: 13 MG/DL (ref 6–23)
CALCIUM SERPL-MCNC: 9.3 MG/DL (ref 8.6–10.3)
CHLORIDE SERPL-SCNC: 101 MMOL/L (ref 98–107)
CO2 SERPL-SCNC: 30 MMOL/L (ref 21–32)
CREAT SERPL-MCNC: 0.68 MG/DL (ref 0.5–1.3)
EGFRCR SERPLBLD CKD-EPI 2021: >90 ML/MIN/1.73M*2
ERYTHROCYTE [DISTWIDTH] IN BLOOD BY AUTOMATED COUNT: 13 % (ref 11.5–14.5)
GLUCOSE BLD MANUAL STRIP-MCNC: 223 MG/DL (ref 74–99)
GLUCOSE BLD MANUAL STRIP-MCNC: 244 MG/DL (ref 74–99)
GLUCOSE BLD MANUAL STRIP-MCNC: 283 MG/DL (ref 74–99)
GLUCOSE BLD MANUAL STRIP-MCNC: 322 MG/DL (ref 74–99)
GLUCOSE SERPL-MCNC: 237 MG/DL (ref 74–99)
HCT VFR BLD AUTO: 40.6 % (ref 41–52)
HGB BLD-MCNC: 14 G/DL (ref 13.5–17.5)
MCH RBC QN AUTO: 31.3 PG (ref 26–34)
MCHC RBC AUTO-ENTMCNC: 34.5 G/DL (ref 32–36)
MCV RBC AUTO: 91 FL (ref 80–100)
NRBC BLD-RTO: 0 /100 WBCS (ref 0–0)
PLATELET # BLD AUTO: 207 X10*3/UL (ref 150–450)
POTASSIUM SERPL-SCNC: 4.6 MMOL/L (ref 3.5–5.3)
PROCALCITONIN SERPL-MCNC: 0.02 NG/ML
RBC # BLD AUTO: 4.48 X10*6/UL (ref 4.5–5.9)
SODIUM SERPL-SCNC: 135 MMOL/L (ref 136–145)
WBC # BLD AUTO: 6.3 X10*3/UL (ref 4.4–11.3)

## 2024-11-27 PROCEDURE — 2500000002 HC RX 250 W HCPCS SELF ADMINISTERED DRUGS (ALT 637 FOR MEDICARE OP, ALT 636 FOR OP/ED): Performed by: NURSE PRACTITIONER

## 2024-11-27 PROCEDURE — 85027 COMPLETE CBC AUTOMATED: CPT | Performed by: INTERNAL MEDICINE

## 2024-11-27 PROCEDURE — 36415 COLL VENOUS BLD VENIPUNCTURE: CPT | Performed by: STUDENT IN AN ORGANIZED HEALTH CARE EDUCATION/TRAINING PROGRAM

## 2024-11-27 PROCEDURE — 87070 CULTURE OTHR SPECIMN AEROBIC: CPT | Mod: WESLAB | Performed by: STUDENT IN AN ORGANIZED HEALTH CARE EDUCATION/TRAINING PROGRAM

## 2024-11-27 PROCEDURE — 84145 PROCALCITONIN (PCT): CPT | Mod: WESLAB | Performed by: STUDENT IN AN ORGANIZED HEALTH CARE EDUCATION/TRAINING PROGRAM

## 2024-11-27 PROCEDURE — 1200000002 HC GENERAL ROOM WITH TELEMETRY DAILY

## 2024-11-27 PROCEDURE — 94664 DEMO&/EVAL PT USE INHALER: CPT

## 2024-11-27 PROCEDURE — 94668 MNPJ CHEST WALL SBSQ: CPT

## 2024-11-27 PROCEDURE — 2500000001 HC RX 250 WO HCPCS SELF ADMINISTERED DRUGS (ALT 637 FOR MEDICARE OP): Performed by: STUDENT IN AN ORGANIZED HEALTH CARE EDUCATION/TRAINING PROGRAM

## 2024-11-27 PROCEDURE — 82947 ASSAY GLUCOSE BLOOD QUANT: CPT

## 2024-11-27 PROCEDURE — 99232 SBSQ HOSP IP/OBS MODERATE 35: CPT | Performed by: STUDENT IN AN ORGANIZED HEALTH CARE EDUCATION/TRAINING PROGRAM

## 2024-11-27 PROCEDURE — 97535 SELF CARE MNGMENT TRAINING: CPT | Mod: GO,CO

## 2024-11-27 PROCEDURE — 80048 BASIC METABOLIC PNL TOTAL CA: CPT | Performed by: INTERNAL MEDICINE

## 2024-11-27 PROCEDURE — 2500000001 HC RX 250 WO HCPCS SELF ADMINISTERED DRUGS (ALT 637 FOR MEDICARE OP): Performed by: NURSE PRACTITIONER

## 2024-11-27 PROCEDURE — 97530 THERAPEUTIC ACTIVITIES: CPT | Mod: GO,CO

## 2024-11-27 PROCEDURE — 2500000004 HC RX 250 GENERAL PHARMACY W/ HCPCS (ALT 636 FOR OP/ED): Performed by: NURSE PRACTITIONER

## 2024-11-27 PROCEDURE — 94640 AIRWAY INHALATION TREATMENT: CPT

## 2024-11-27 PROCEDURE — 2500000002 HC RX 250 W HCPCS SELF ADMINISTERED DRUGS (ALT 637 FOR MEDICARE OP, ALT 636 FOR OP/ED): Performed by: INTERNAL MEDICINE

## 2024-11-27 SDOH — SOCIAL STABILITY: SOCIAL INSECURITY: ABUSE: ADULT

## 2024-11-27 SDOH — SOCIAL STABILITY: SOCIAL INSECURITY: DO YOU FEEL UNSAFE GOING BACK TO THE PLACE WHERE YOU ARE LIVING?: NO

## 2024-11-27 SDOH — SOCIAL STABILITY: SOCIAL INSECURITY: HAVE YOU HAD THOUGHTS OF HARMING ANYONE ELSE?: NO

## 2024-11-27 SDOH — SOCIAL STABILITY: SOCIAL INSECURITY: DOES ANYONE TRY TO KEEP YOU FROM HAVING/CONTACTING OTHER FRIENDS OR DOING THINGS OUTSIDE YOUR HOME?: NO

## 2024-11-27 SDOH — SOCIAL STABILITY: SOCIAL INSECURITY: ARE YOU OR HAVE YOU BEEN THREATENED OR ABUSED PHYSICALLY, EMOTIONALLY, OR SEXUALLY BY ANYONE?: NO

## 2024-11-27 SDOH — SOCIAL STABILITY: SOCIAL INSECURITY: HAS ANYONE EVER THREATENED TO HURT YOUR FAMILY OR YOUR PETS?: NO

## 2024-11-27 SDOH — SOCIAL STABILITY: SOCIAL INSECURITY: WERE YOU ABLE TO COMPLETE ALL THE BEHAVIORAL HEALTH SCREENINGS?: YES

## 2024-11-27 SDOH — SOCIAL STABILITY: SOCIAL INSECURITY: HAVE YOU HAD ANY THOUGHTS OF HARMING ANYONE ELSE?: NO

## 2024-11-27 ASSESSMENT — ACTIVITIES OF DAILY LIVING (ADL)
ASSISTIVE_DEVICE: EYEGLASSES
TOILETING: INDEPENDENT
HEARING - RIGHT EAR: FUNCTIONAL
HOME_MANAGEMENT_TIME_ENTRY: 19
GROOMING: INDEPENDENT
WALKS IN HOME: INDEPENDENT
JUDGMENT_ADEQUATE_SAFELY_COMPLETE_DAILY_ACTIVITIES: YES
DRESSING YOURSELF: INDEPENDENT
PATIENT'S MEMORY ADEQUATE TO SAFELY COMPLETE DAILY ACTIVITIES?: YES
HEARING - LEFT EAR: FUNCTIONAL
BATHING: INDEPENDENT
FEEDING YOURSELF: INDEPENDENT
ADEQUATE_TO_COMPLETE_ADL: YES

## 2024-11-27 ASSESSMENT — PAIN SCALES - GENERAL
PAINLEVEL_OUTOF10: 0 - NO PAIN

## 2024-11-27 ASSESSMENT — PAIN - FUNCTIONAL ASSESSMENT
PAIN_FUNCTIONAL_ASSESSMENT: FLACC (FACE, LEGS, ACTIVITY, CRY, CONSOLABILITY)
PAIN_FUNCTIONAL_ASSESSMENT: 0-10
PAIN_FUNCTIONAL_ASSESSMENT: FLACC (FACE, LEGS, ACTIVITY, CRY, CONSOLABILITY)
PAIN_FUNCTIONAL_ASSESSMENT: 0-10

## 2024-11-27 ASSESSMENT — COGNITIVE AND FUNCTIONAL STATUS - GENERAL
HELP NEEDED FOR BATHING: A LITTLE
MOBILITY SCORE: 24
DRESSING REGULAR LOWER BODY CLOTHING: A LITTLE
PERSONAL GROOMING: A LITTLE
MOBILITY SCORE: 23
DRESSING REGULAR UPPER BODY CLOTHING: A LITTLE
DAILY ACTIVITIY SCORE: 24
DRESSING REGULAR LOWER BODY CLOTHING: A LITTLE
CLIMB 3 TO 5 STEPS WITH RAILING: A LITTLE
HELP NEEDED FOR BATHING: A LITTLE
DAILY ACTIVITIY SCORE: 22
DAILY ACTIVITIY SCORE: 19
PATIENT BASELINE BEDBOUND: NO
TOILETING: A LITTLE

## 2024-11-27 ASSESSMENT — LIFESTYLE VARIABLES
SKIP TO QUESTIONS 9-10: 1
HOW OFTEN DO YOU HAVE 6 OR MORE DRINKS ON ONE OCCASION: NEVER
AUDIT-C TOTAL SCORE: 1
SUBSTANCE_ABUSE_PAST_12_MONTHS: NO
HOW OFTEN DO YOU HAVE A DRINK CONTAINING ALCOHOL: MONTHLY OR LESS
HOW MANY STANDARD DRINKS CONTAINING ALCOHOL DO YOU HAVE ON A TYPICAL DAY: 1 OR 2
AUDIT-C TOTAL SCORE: 1
PRESCIPTION_ABUSE_PAST_12_MONTHS: NO

## 2024-11-27 ASSESSMENT — PATIENT HEALTH QUESTIONNAIRE - PHQ9
SUM OF ALL RESPONSES TO PHQ9 QUESTIONS 1 & 2: 0
2. FEELING DOWN, DEPRESSED OR HOPELESS: NOT AT ALL
1. LITTLE INTEREST OR PLEASURE IN DOING THINGS: NOT AT ALL

## 2024-11-27 NOTE — CARE PLAN
The patient's goals for the shift include      The clinical goals for the shift include no falls, wean O2 as tolerated    Over the shift, the patient did not make progress toward the following goals. Barriers to progression include dc. Recommendations to address these barriers include dc.

## 2024-11-27 NOTE — PROGRESS NOTES
Aaron Shaw is a 69 y.o. male on day 2 of admission presenting with Altered mental status, unspecified altered mental status type.      Subjective   Right upper lobe infiltrate new 6 mm lung nodule       Objective     Last Recorded Vitals  /58 (BP Location: Left arm, Patient Position: Lying)   Pulse 79   Temp 36.6 °C (97.9 °F) (Oral)   Resp 18   Wt 78.7 kg (173 lb 8 oz)   SpO2 93% Comment: On RA at rest, the pt's SPO2 was 87% so he was placed on NC 2L which increased his SPO2 to 93%. During exertion on NC 2L, the pt maintained an SPO2 of 91% or greater, no complications. The RN and Dr. Montes were notified.  Intake/Output last 3 Shifts:    Intake/Output Summary (Last 24 hours) at 11/27/2024 1408  Last data filed at 11/27/2024 1154  Gross per 24 hour   Intake 1947.5 ml   Output 1975 ml   Net -27.5 ml       Admission Weight  Weight: 72.6 kg (160 lb) (11/25/24 1330)    Daily Weight  11/27/24 : 78.7 kg (173 lb 8 oz)    Image Results  CT chest wo IV contrast  Narrative: Interpreted By:  Tr Dickerson,   STUDY:  CT CHEST WO IV CONTRAST; ;  11/26/2024 12:25 pm      INDICATION:  Signs/Symptoms:lung congestion, cough,nodule?.          COMPARISON:  07/21/2020      ACCESSION NUMBER(S):  [id number]      ORDERING CLINICIAN:  JOHNY WESTFALL      TECHNIQUE:  Serial axial unenhanced CT images obtained of the chest. Images  reformatted in the coronal and sagittal projection      All CT examinations are performed with 1 or more of the following  dose reduction techniques: Automated exposure control, adjustment of  mA and/or kv according to patient's size, or use of iterative  reconstruction techniques.      FINDINGS:  Mediastinum demonstrates scattered subcentimeter paratracheal lymph  nodes. There is precarinal lymphadenopathy identified measuring 9 x  12 mm. Findings are nonspecific. No hilar lymphadenopathy with  scattered subcentimeter lymph nodes demonstrated. Esophagus is  unremarkable      Heart and great  vessels demonstrate moderate vascular calcification  of the thoracic aorta. Ascending thoracic aorta measures 3.4 cm main  pulmonary artery measures 3.3 cm with mild dilatation. No cardiomegaly      Lung parenchyma demonstrates diffuse emphysematous changes. There is  a chronic appearing septal thickening there is nodularity  demonstrated along the minor fissure measuring 6 mm (series 6, image  217). Linear appearing scar and ground-glass airspace infiltrate at  the right lung apex laterally with surrounding pleural thickening.  This is an area of previously noted large apical bulla. Component of  previously described findings likely relate to underlying scar or  even potential infiltrate. No consolidation. Left lung demonstrates  no focal infiltrate. There is scar along the lingula. No pleural  effusions.      Liver demonstrates no focal abnormality visualized portions within  limits of this unenhanced CT. Remainder of the visualized abdomen  demonstrates nodularity within the adrenal glands. For example, left  adrenal gland nodule measuring 2.7 cm with average Hounsfield unit 3  in keeping with adenoma.      Visualized osseous structures demonstrate mild multilevel anterior  osteophyte formation mid to lower thoracic spine. No compression  deformity demonstrated.                      Impression: 1. Diffuse emphysematous changes demonstrated with subpleural  reticulation and scar in the right upper lung zone laterally.  Superimposed component of infiltrate in this areas not excluded  without dense airspace consolidation. There is a linear configuration  of the area of presumed infiltrate.      2. There is a 6 mm pulmonary nodule along the minor fissure with  follow-up CT chest recommended in 6 months to document stability.      3. Adrenal nodularity with underlying adenoma                  MACRO:  None      Signed by: Tr Dickerson 11/27/2024 8:20 AM  Dictation workstation:   [id number]      Physical  Exam    Relevant Results               Assessment/Plan                  Assessment & Plan  Altered mental status, unspecified altered mental status type    Abdominal pain    Hypomagnesemia    Nausea    Weakness    Type 2 diabetes mellitus with circulatory disorder, without long-term current use of insulin    Hypertension    Tobacco abuse    Pulmonary emphysema (Multi)    Pneumonia probable, lung nodule              Neva Stoddard MD

## 2024-11-27 NOTE — NURSING NOTE
Took over care of pt at this time. Pt laying in bed, alert, aox4. Pt denies pain and has no needs or complaints at this time. Pt oriented to call bell and it and belongings are placed in reach. Telemetry leads connected and reading on monitor. Bed alarm set. NC O2 in place and connected to flowmeter. Continuing to monitor.

## 2024-11-27 NOTE — PROGRESS NOTES
"Aaron Shaw is a 69 y.o. male on day 2 of admission presenting with Altered mental status, unspecified altered mental status type.    Subjective   Patient denies any nausea, vomiting, diarrhea        Objective     Physical Exam  HENT:      Head: Normocephalic.      Nose: Nose normal.      Mouth/Throat:      Mouth: Mucous membranes are moist.   Eyes:      Pupils: Pupils are equal, round, and reactive to light.   Cardiovascular:      Rate and Rhythm: Normal rate.   Pulmonary:      Breath sounds: Normal breath sounds.   Abdominal:      Palpations: Abdomen is soft.   Musculoskeletal:         General: Normal range of motion.      Cervical back: Normal range of motion.   Skin:     General: Skin is warm.   Neurological:      General: No focal deficit present.      Mental Status: He is alert.   Psychiatric:         Mood and Affect: Mood normal.         Last Recorded Vitals  Blood pressure 144/60, pulse 72, temperature 36.7 °C (98.1 °F), temperature source Oral, resp. rate 19, height 1.829 m (6' 0.01\"), weight 78.7 kg (173 lb 8 oz), SpO2 96%.  Intake/Output last 3 Shifts:  I/O last 3 completed shifts:  In: 3071.7 (39 mL/kg) [P.O.:900; I.V.:2121.7 (27 mL/kg); IV Piggyback:50]  Out: 1675 (21.3 mL/kg) [Urine:1675 (0.6 mL/kg/hr)]  Weight: 78.7 kg     Relevant Results  CT chest wo IV contrast    Result Date: 11/27/2024  Interpreted By:  Tr Dickerson, STUDY: CT CHEST WO IV CONTRAST; ;  11/26/2024 12:25 pm   INDICATION: Signs/Symptoms:lung congestion, cough,nodule?.     COMPARISON: 07/21/2020   ACCESSION NUMBER(S): [id number]   ORDERING CLINICIAN: JOHNY WESTFALL   TECHNIQUE: Serial axial unenhanced CT images obtained of the chest. Images reformatted in the coronal and sagittal projection   All CT examinations are performed with 1 or more of the following dose reduction techniques: Automated exposure control, adjustment of mA and/or kv according to patient's size, or use of iterative reconstruction techniques.   FINDINGS: " Mediastinum demonstrates scattered subcentimeter paratracheal lymph nodes. There is precarinal lymphadenopathy identified measuring 9 x 12 mm. Findings are nonspecific. No hilar lymphadenopathy with scattered subcentimeter lymph nodes demonstrated. Esophagus is unremarkable   Heart and great vessels demonstrate moderate vascular calcification of the thoracic aorta. Ascending thoracic aorta measures 3.4 cm main pulmonary artery measures 3.3 cm with mild dilatation. No cardiomegaly   Lung parenchyma demonstrates diffuse emphysematous changes. There is a chronic appearing septal thickening there is nodularity demonstrated along the minor fissure measuring 6 mm (series 6, image 217). Linear appearing scar and ground-glass airspace infiltrate at the right lung apex laterally with surrounding pleural thickening. This is an area of previously noted large apical bulla. Component of previously described findings likely relate to underlying scar or even potential infiltrate. No consolidation. Left lung demonstrates no focal infiltrate. There is scar along the lingula. No pleural effusions.   Liver demonstrates no focal abnormality visualized portions within limits of this unenhanced CT. Remainder of the visualized abdomen demonstrates nodularity within the adrenal glands. For example, left adrenal gland nodule measuring 2.7 cm with average Hounsfield unit 3 in keeping with adenoma.   Visualized osseous structures demonstrate mild multilevel anterior osteophyte formation mid to lower thoracic spine. No compression deformity demonstrated.               1. Diffuse emphysematous changes demonstrated with subpleural reticulation and scar in the right upper lung zone laterally. Superimposed component of infiltrate in this areas not excluded without dense airspace consolidation. There is a linear configuration of the area of presumed infiltrate.   2. There is a 6 mm pulmonary nodule along the minor fissure with follow-up CT chest  recommended in 6 months to document stability.   3. Adrenal nodularity with underlying adenoma         MACRO: None   Signed by: Tr Dickerson 11/27/2024 8:20 AM Dictation workstation:   [id number]    ECG 12 lead    Result Date: 11/26/2024  Normal sinus rhythm Early repolarization Normal ECG When compared with ECG of 11-APR-2024 13:20, Vent. rate has decreased BY  43 BPM    CT abdomen pelvis w IV contrast    Result Date: 11/25/2024  STUDY: CT Abdomen and Pelvis with IV Contrast; 11/25/2024 5:21 pm INDICATION: Vomiting.  Confusion. COMPARISON: None Available. ACCESSION NUMBER(S): [id number] ORDERING CLINICIAN: PARAMJIT WALTER TECHNIQUE: CT of the abdomen and pelvis was performed.  Contiguous axial images were obtained at 3 mm slice thickness through the abdomen and pelvis. Coronal and sagittal reconstructions at 3 mm slice thickness were performed.  Omnipaque 350, 100 mL was administered intravenously. FINDINGS: LOWER CHEST: No cardiomegaly.  No pericardial effusion.  Lung bases demonstrate chronic and emphysematous changes.  There may be trace right pleural fluid.  ABDOMEN:  LIVER: No hepatomegaly.  Smooth surface contour.  Normal attenuation.  BILE DUCTS: No intrahepatic or extrahepatic biliary ductal dilatation.  GALLBLADDER: The gallbladder is surgically absent. STOMACH: Is mild wall thickening of the stomach.  Please correlate for gastritis.  PANCREAS: No masses or ductal dilatation.  SPLEEN: No splenomegaly or focal splenic lesion.  ADRENAL GLANDS: There is left adrenal nodule measuring 3.2 x 2.6 cm measuring 45 Hounsfield units.  There is a smaller right adrenal nodule measuring 2.8 x 1.3 cm measuring 39 Hounsfield units.  KIDNEYS AND URETERS: Kidneys are normal in size and location.  There is a 9 mm nonobstructive calculus in the lower pole of the left kidney.  There is also a 6 mm calculus in the right renal pelvis without hydronephrosis.  There is small left renal cyst.  PELVIS:  BLADDER: No  abnormalities identified.  REPRODUCTIVE ORGANS: No abnormalities identified.  Prostate gland measures 4.9 cm.  BOWEL: There is mild to moderate colonic stool.  There is wall thickening in the descending and sigmoid colon compatible with colitis with scattered diverticula. VESSELS: No abnormalities identified.  Abdominal aorta is normal in caliber.  PERITONEUM/RETROPERITONEUM/LYMPH NODES: No free fluid.  No pneumoperitoneum. No lymphadenopathy.  ABDOMINAL WALL: No abnormalities identified. SOFT TISSUES: No abnormalities identified.  BONES: No acute fracture or aggressive osseous lesion.  There is disc space narrowing and posterior disc osteophyte formation and disc protrusion at L5-S1.    Mild wall thickening of the descending sigmoid colon suggesting component of colitis.  Scattered diverticula. Mild wall thickening of the stomach suggesting gastritis. Nonobstructive bilateral renal calculi with 6 mm calculus in the right renal pelvis. Indeterminate bilateral adrenal nodules.  Findings would better evaluated with MRI or dynamic contrast enhanced CT.  Findings appear stable. Trace right pleural fluid.  Significant degenerative changes at L5-S1. Signed by Puma Kim,     CT head wo IV contrast    Result Date: 11/25/2024  Interpreted By:  Мария Linder, STUDY: CT HEAD WO IV CONTRAST;  11/25/2024 3:41 pm   INDICATION: Signs/Symptoms:confusion.   COMPARISON: None   ACCESSION NUMBER(S): [id number]   ORDERING CLINICIAN: PARAMJIT WALTER   TECHNIQUE: Examination was performed in the axial plane using soft tissue and bone algorithm.   FINDINGS: INTRACRANIAL: There is prominence of the ventricular system and cerebral sulci consistent with cerebral atrophy. There are periventricular hypodensities consistent with  mild small vessel disease. No mass or mass effect is identified. There is no hemorrhage or subdural fluid collection. There is no acute infarct.     EXTRACRANIAL: There is moderate mucosal thickening of  the frontal sinuses and ethmoid air cells.       No acute intracranial pathology.   MACRO: None   Signed by: Мария Linder 11/25/2024 3:49 PM Dictation workstation:   [id number]    XR chest 1 view    Result Date: 11/25/2024  Interpreted By:  Jus Martinez, STUDY: XR CHEST 1 VIEW;  11/25/2024 2:12 pm   INDICATION: Signs/Symptoms:confusion, cough.     COMPARISON: 04/11/2024   ACCESSION NUMBER(S): [id number]   ORDERING CLINICIAN: PARAMJIT WALTER   FINDINGS: AP portable view of the chest is obtained.  Limited exam due to portable nature. Magnified cardiac silhouette. An area of subpleural nodularity in the right upper lobe is similar to the prior exam and may represent scarring. Short-term follow-up or CT of the chest as clinically warranted.       An area of subpleural nodularity in the right upper lobe is similar to the prior exam and may represent scarring. Short-term follow-up or CT of the chest as clinically warranted.   MACRO: None   Signed by: Jus Martinez 11/25/2024 2:32 PM Dictation workstation:   [id number]      Scheduled medications  cefTRIAXone, 1 g, intravenous, q24h  doxycycline, 100 mg, oral, q12h BASIM  enoxaparin, 40 mg, subcutaneous, Daily  insulin lispro, 0-5 Units, subcutaneous, q4h  ipratropium-albuteroL, 3 mL, nebulization, TID  lisinopril, 5 mg, oral, Daily  nicotine, 1 patch, transdermal, Daily  pantoprazole, 20 mg, intravenous, Daily  predniSONE, 20 mg, oral, Daily  pregabalin, 300 mg, oral, BID      Continuous medications     PRN medications  PRN medications: acetaminophen **OR** acetaminophen **OR** acetaminophen, albuterol, morphine, ondansetron **OR** ondansetron  Results for orders placed or performed during the hospital encounter of 11/25/24 (from the past 24 hours)   POCT GLUCOSE   Result Value Ref Range    POCT Glucose 232 (H) 74 - 99 mg/dL   POCT GLUCOSE   Result Value Ref Range    POCT Glucose 217 (H) 74 - 99 mg/dL   POCT GLUCOSE   Result Value Ref Range    POCT Glucose 221 (H)  74 - 99 mg/dL   Basic Metabolic Panel   Result Value Ref Range    Glucose 237 (H) 74 - 99 mg/dL    Sodium 135 (L) 136 - 145 mmol/L    Potassium 4.6 3.5 - 5.3 mmol/L    Chloride 101 98 - 107 mmol/L    Bicarbonate 30 21 - 32 mmol/L    Anion Gap 9 (L) 10 - 20 mmol/L    Urea Nitrogen 13 6 - 23 mg/dL    Creatinine 0.68 0.50 - 1.30 mg/dL    eGFR >90 >60 mL/min/1.73m*2    Calcium 9.3 8.6 - 10.3 mg/dL   CBC   Result Value Ref Range    WBC 6.3 4.4 - 11.3 x10*3/uL    nRBC 0.0 0.0 - 0.0 /100 WBCs    RBC 4.48 (L) 4.50 - 5.90 x10*6/uL    Hemoglobin 14.0 13.5 - 17.5 g/dL    Hematocrit 40.6 (L) 41.0 - 52.0 %    MCV 91 80 - 100 fL    MCH 31.3 26.0 - 34.0 pg    MCHC 34.5 32.0 - 36.0 g/dL    RDW 13.0 11.5 - 14.5 %    Platelets 207 150 - 450 x10*3/uL   POCT GLUCOSE   Result Value Ref Range    POCT Glucose 223 (H) 74 - 99 mg/dL                            Assessment/Plan   Assessment & Plan  Altered mental status, unspecified altered mental status type    Abdominal pain    Hypomagnesemia    Nausea    Weakness    Type 2 diabetes mellitus with circulatory disorder, without long-term current use of insulin    Hypertension    Tobacco abuse    Pulmonary emphysema (Multi)    Altered Mental Status, Diarrhea, Abnormal CT   CT a/p showing gastric and sigmoid wall thickening. Possible this was some isolated infection. Symptoms have resolved. He has no abdominal pain at all. Need to r/o other causes of altered mentation per primary               -PPI daily. No melena. HH normal. No plans for inpatient endoscopy               -Recommend outpatient EGD and colonoscopy      11/27  Symptoms have resolved.  No leukocytosis.  Will follow-up for outpatient EGD and colonoscopy.     Imani Ibanez, APRN-CNP

## 2024-11-27 NOTE — CARE PLAN
Problem: Safety - Adult  Goal: Free from fall injury  Outcome: Progressing     Problem: Discharge Planning  Goal: Discharge to home or other facility with appropriate resources  Outcome: Progressing     Problem: Chronic Conditions and Co-morbidities  Goal: Patient's chronic conditions and co-morbidity symptoms are monitored and maintained or improved  Outcome: Progressing     Problem: Diabetes  Goal: Achieve decreasing blood glucose levels by end of shift  Outcome: Progressing  Goal: Increase stability of blood glucose readings by end of shift  Outcome: Progressing  Goal: Decrease in ketones present in urine by end of shift  Outcome: Progressing  Goal: Maintain electrolyte levels within acceptable range throughout shift  Outcome: Progressing  Goal: Maintain glucose levels >70mg/dl to <250mg/dl throughout shift  Outcome: Progressing  Goal: No changes in neurological exam by end of shift  Outcome: Progressing  Goal: Learn about and adhere to nutrition recommendations by end of shift  Outcome: Progressing  Goal: Vital signs within normal range for age by end of shift  Outcome: Progressing  Goal: Increase self care and/or family involovement by end of shift  Outcome: Progressing  Goal: Receive DSME education by end of shift  Outcome: Progressing     Problem: Pain  Goal: Takes deep breaths with improved pain control throughout the shift  Outcome: Progressing  Goal: Turns in bed with improved pain control throughout the shift  Outcome: Progressing  Goal: Walks with improved pain control throughout the shift  Outcome: Progressing  Goal: Performs ADL's with improved pain control throughout shift  Outcome: Progressing  Goal: Participates in PT with improved pain control throughout the shift  Outcome: Progressing  Goal: Free from opioid side effects throughout the shift  Outcome: Progressing  Goal: Free from acute confusion related to pain meds throughout the shift  Outcome: Progressing   The patient's goals for the shift  include      The clinical goals for the shift include no falls, wean O2 as tolerated    Over the shift, the patient did make progress toward the goals.

## 2024-11-27 NOTE — NURSING NOTE
Rounded on pt. Pt laying in bed, eyes closed, respirations even and unlabored. Pt appears to be sleeping and in no apparent pain or distress. Call bell and belongings are placed in reach. Telemetry leads connected and reading on monitor. NC O2 in place and connected to flowmeter. Bed alarm set. Continuing to monitor.

## 2024-11-27 NOTE — NURSING NOTE
Rounded on pt. Pt laying in bed, eyes closed, respirations even and unlabored. Pt appears to be sleeping and in no apparent pain or distress. Uneventful night with no changes since prior assessment. Call bell and belongings are placed in reach. Telemetry leads connected and reading on monitor. NC O2 in place and connected to flowmeter. Bed alarm set. Continuing to monitor.

## 2024-11-27 NOTE — PROGRESS NOTES
"Pulmonary Daily Progress Note   Subjective    Aaron Shaw is a 69 y.o. year old male patient known with 69 y.o. male with PMHX significant for  COPD/ emphysema (no PFT on chart, on spiriva, no Pulm OP doctor), chronic pancreatitis, DJD, DM2, diabetic neuropathy, HLD, HTN, SSC of skiin, tobacco abuse, alcohol abuse, illicit drug abuse, pulmonary nodule, DJD, and low back pain presented to the emergency department for mental status change and abdominal pain.  admitted on 11/25/2024 with following abdominal pain and AMS. Pulmonary consulted for emphysema, lung congestion and lung nodule     Interval History:  Patient mentions improvement since yesterday/remains to have a cough but denies shortness of breath or chest pain.  He has a home oxygen device from the [employer] that he and his son work with but it is not prescribed by a doctor.  Last time the patient used the oxygen was last Friday.  He denies chest pain on exertion however was asking about cardiac workup as he has family history of heart disease in his father.  Required 2 L of oxygen on rest and exertion    Meds    Scheduled medications  doxycycline, 100 mg, oral, q12h BASIM  enoxaparin, 40 mg, subcutaneous, Daily  insulin lispro, 0-5 Units, subcutaneous, q4h  ipratropium-albuteroL, 3 mL, nebulization, TID  lisinopril, 5 mg, oral, Daily  nicotine, 1 patch, transdermal, Daily  pantoprazole, 20 mg, intravenous, Daily  predniSONE, 20 mg, oral, Daily  pregabalin, 300 mg, oral, BID      Continuous medications     PRN medications  PRN medications: acetaminophen **OR** acetaminophen **OR** acetaminophen, albuterol, morphine, ondansetron **OR** ondansetron     Objective    Blood pressure 103/58, pulse 79, temperature 36.6 °C (97.9 °F), temperature source Oral, resp. rate 18, height 1.829 m (6' 0.01\"), weight 78.7 kg (173 lb 8 oz), SpO2 93%.   Physical Exam   GENERAL: No respiratory distress  HEAD/SINUSES: On 2 L oxygen  LUNGS: Rhonchi and wheezing bilaterally " improve since yesterday   CARDIAC:Regular rate and rhythm  EXTREMITIES: No edema,  NEURO: grossly normal mental status  SKIN: Skin turgor normal.  PSYCH: Normal affect    Intake/Output Summary (Last 24 hours) at 11/27/2024 1447  Last data filed at 11/27/2024 1400  Gross per 24 hour   Intake 2126.25 ml   Output 1975 ml   Net 151.25 ml     Labs:   Results from last 72 hours   Lab Units 11/27/24  0510 11/26/24  0516 11/25/24  1403   SODIUM mmol/L 135* 137 134*   POTASSIUM mmol/L 4.6 4.0 4.3   CHLORIDE mmol/L 101 100 98   CO2 mmol/L 30 31 26   BUN mg/dL 13 12 14   CREATININE mg/dL 0.68 0.67 0.64   GLUCOSE mg/dL 237* 156* 252*   CALCIUM mg/dL 9.3 9.5 10.2   ANION GAP mmol/L 9* 10 14   EGFR mL/min/1.73m*2 >90 >90 >90      Results from last 72 hours   Lab Units 11/27/24  0511 11/26/24  0516 11/25/24  1403   WBC AUTO x10*3/uL 6.3 10.5 7.6   HEMOGLOBIN g/dL 14.0 14.9 15.3   HEMATOCRIT % 40.6* 42.7 44.2   PLATELETS AUTO x10*3/uL 207 231 243   NEUTROS PCT AUTO %  --  77.9 80.7   LYMPHS PCT AUTO %  --  11.8 13.3   MONOS PCT AUTO %  --  9.3 5.4   EOS PCT AUTO %  --  0.4 0.0          Results from last 72 hours   Lab Units 11/25/24  1403   POCT PH, VENOUS pH 7.43   POCT PCO2, VENOUS mm Hg 42   POCT PO2, VENOUS mm Hg 44      Micro/ID:   Lab Results   Component Value Date    BLOODCULT No growth at 4 days -  FINAL REPORT 04/11/2024    BLOODCULT No growth at 4 days -  FINAL REPORT 04/11/2024     Summary of key imaging results from the last 24 hours  Chest x-ray right upper lobe opacification versus scarring  CT chest has not been read yet however demonstrates severe emphysema, infiltrates  in the right lower lobe, right upper lobe scarring, Official read with fissural nodule 6 mm     Impression   Aaron Shaw is a 69 y.o. year old male patient known with 69 y.o. male with PMHX significant for  COPD/ emphysema (no PFT on chart, on spiriva, no Pulm OP doctor), chronic pancreatitis, DJD, DM2, diabetic neuropathy, HLD, HTN, SSC of  skiin, tobacco abuse, alcohol abuse, illicit drug abuse, pulmonary nodule, DJD, and low back pain presented to the emergency department for mental status change and abdominal pain.  admitted on 11/25/2024 with following abdominal pain and AMS. Pulmonary consulted for emphysema, lung congestion and lung nodule   Acute hypoxic respiratory failure 2 L of oxygen due to severe emphysema, COPD exacerbation, aspiration versus bronchopneumonia picture  COPD//emphysema no PFTs on chart, no pulmonologist, on Spiriva, currently in exacerbation  Heavy smoker  Right upper lobe scarring most probably caused by pneumonia in April  Bronchopneumonia vs  aspiration from vomiting    Recommendations   As follows:  AECOPD management with prednisone for 5 days (prescribed lower dose because of gastritis), scheduled aerosol treatment, and doxycycline   sputum culture pending however discontinued ceftriaxone as procalcitonin is negative and doxycycline should be sufficient by itself,   Wean oxygen as tolerated, will reattempt home oxygen assessment tomorrow morning. patient agreeable.  Hesitation to send home oxygen as patient is a smoker however informed about the risk of smoking with oxygen at home.  With patient's significant  emphysema and presumed COPD (no PFT on chart) it is not unreasonable that patient might require oxygen long-term especially that he is hypoxic at rest  bronchopulmonary hygiene with Incentive spirometry and Acapella   Smoking cessation  Could consider SLP evaluation  Patient will need outpatient pulmonary follow-up for PFTs and COPD management  Once ready for discharge patient can be sent on LAMA/LABA combination , recommend stiolto 2 puffs daily instead of spiriva if covered by insurance  Patient will need repeat imaging in 6 to 8 weeks to ensure resolution of infiltrates and follow-up on right upper lobe scarring and follow up on the fissural lung nodule.       Katelyn Montes MD   11/27/24 at 2:47 PM     -Only  the Medical problems listed under impression were addressed today.   -Please contact primary team for all other concerns and medical problem  -Thank you for your consult       Disclaimer: Documentation completed with the information available at the time of input. Parts of this note may have been scribed or generated using voice dictation software, Dragon.  Homophonic errors may exist.  Please contact me directly if clarification is needed. The times in the chart may not be reflective of actual patient care times, interventions, or procedures. Documentation occurs after the physical care of the patient.

## 2024-11-27 NOTE — PROGRESS NOTES
LP[facility] met with the pt, discussion around therapy evals and their recommendation for a stay at SNF, pt declines SNF, states he is moving around at baseline, also declines HHC. Pt is eager to return home.      11/27/24 1024   Discharge Planning   Expected Discharge Disposition Home

## 2024-11-27 NOTE — SIGNIFICANT EVENT
Home Oxygen certification per Dr. Montes:    On RA at rest, the pt's SPO2 was 87% so he was placed on NC 2L which increased his SPO2 to 93%.     During exertion on NC 2L, the pt maintained an SPO2 of 91% or greater, no complications.    The RN and Dr. Montes were notified.

## 2024-11-27 NOTE — PROGRESS NOTES
Occupational Therapy    Occupational Therapy Treatment    Name: Aaron Shaw  MRN: [id number]  Department: [facility] 4 E  Room: 458458-A  Date: 11/27/24  Time Calculation  Start Time: 1437  Stop Time: 1504  Time Calculation (min): 27 min    Assessment:  OT Assessment: Pt presents with fatigue and min sequencing impairments which decreases ability to perform ADLs. Pt would benefit from continued Ot services to increase independence in ADLs and functional transfers.  Prognosis: Fair  Barriers to Discharge: Decreased caregiver support  Evaluation/Treatment Tolerance: Patient limited by fatigue  Medical Staff Made Aware: Yes  End of Session Communication: Bedside nurse  End of Session Patient Position: Bed, 2 rail up, Alarm off, not on at start of session (call light in reach, notified nursing about alarm status. educated pt on safety of pressing call light to notifie nursing when needing to get out of bed.)  Plan:  Treatment Interventions: ADL retraining, Functional transfer training, UE strengthening/ROM, Endurance training, Equipment evaluation/education, Compensatory technique education  OT Frequency: 4 times per week  OT Discharge Recommendations: Moderate intensity level of continued care  Equipment Recommended upon Discharge: Wheeled walker  OT Recommended Transfer Status: Assist of 1, Minimal assist  OT - OK to Discharge: Yes    Subjective   Previous Visit Info:  OT Last Visit  OT Received On: 11/27/24  General:  General  Reason for Referral: Impaired Mobility  Referred By: Neva Stoddard MD  Past Medical History Relevant to Rehab: COPD, chronic pancreatitis, DJD, DM2, diabetic neuropathy, HLD, HTN, SSC of skin, tobacco abuse, alcohol abuse, illicit drug abuse, pulmonary nodule, DJD, and low back pain  Prior to Session Communication: Bedside nurse  Patient Position Received: Bed, 3 rail up, Alarm off, not on at start of session  Preferred Learning Style: verbal, visual  General Comment: Pt agreeable to OT  treatment  Precautions:  Hearing/Visual Limitations: reading glasses  Medical Precautions: Fall precautions, Oxygen therapy device and L/min    Pain Assessment:  Pain Assessment  Pain Assessment: 0-10  0-10 (Numeric) Pain Score: 0 - No pain     Objective   Cognition:  Overall Cognitive Status: Impaired  Orientation Level: Oriented X4  Following Commands: Follows one step commands with increased time  Safety/Judgement: Exceptions to WFL  Impulsive: Mildly  Activities of Daily Living:     Grooming  Grooming Level of Assistance: Close supervision  Grooming Where Assessed: Standing sinkside  Grooming Comments: Pt engaged in combing hair standing sinkside with close supervision    UE Bathing  UE Bathing Level of Assistance: Setup, Close supervision  UE Bathing Where Assessed: Edge of bed  UE Bathing Comments: Pt engaged in sponge bath while seated on EOB with close supervision. Pt engaged in washing UEs and trunk and was able to sequence throughout task.    UE Dressing  UE Dressing Level of Assistance: Distant supervision  UE Dressing Where Assessed: Edge of bed  UE Dressing Comments: Pt engaged in donning/doffing gown in which he required 1 verbal cue to sequence throughout.    Functional Standing Tolerance:  Functional Standing Tolerance  Time: less than a minute  Activity: standing sink side to comb hair  Functional Standing Tolerance Comments: Pt stabilized LUE on sinkside for support  Bed Mobility/Transfers:   Bed Mobility  Bed Mobility: Yes  Bed Mobility 1  Bed Mobility 1: Supine to sitting, Sitting to supine  Level of Assistance 1: Distant supervision  Bed Mobility Comments 1: Pt utilized hand rails with HOB elevated    Transfers  Transfer: Yes  Transfer 1  Transfer From 1: Bed to  Transfer to 1: Stand, Sit  Technique 1: Sit to stand, Stand to sit  Transfer Level of Assistance 1: Close supervision  Trials/Comments 1: transfered from EOB and required verbal cues for hand placement for controlled descent    Sitting  Balance:  Dynamic Sitting Balance  Dynamic Sitting-Balance Support: Feet supported  Dynamic Sitting-Level of Assistance: Distant supervision  Dynamic Sitting-Balance: Reaching for objects, Reaching across midline  Dynamic Sitting-Comments: Pt engaged in washing Ue and trunk while seated at EOB for 15 minutes       Outcome Measures:  [facility] Daily Activity  Putting on and taking off regular lower body clothing: A little  Bathing (including washing, rinsing, drying): A little  Putting on and taking off regular upper body clothing: A little  Toileting, which includes using toilet, bedpan or urinal: A little  Taking care of personal grooming such as brushing teeth: A little  Eating Meals: None  Daily Activity - Total Score: 19        Education Documentation  Body Mechanics, taught by MARIO Jameson at 11/27/2024  4:03 PM.  Learner: Patient  Readiness: Acceptance  Method: Explanation  Response: Verbalizes Understanding  Comment: Pt edcuated on OT POC and fall precautions.    ADL Training, taught by MARIO Jameson at 11/27/2024  4:03 PM.  Learner: Patient  Readiness: Acceptance  Method: Explanation  Response: Verbalizes Understanding  Comment: Pt edcuated on OT POC and fall precautions.      Goals:  Encounter Problems       Encounter Problems (Active)       ADLs       Patient with complete upper body dressing with supervision level of assistance donning and doffing all UE clothes while edge of bed  (Progressing)       Start:  11/26/24    Expected End:  12/26/24            Patient with complete lower body dressing with supervision level of assistance donning all LE clothes  with PRN adaptive equipment while edge of bed  (Progressing)       Start:  11/26/24    Expected End:  12/26/24            Patient will complete daily grooming tasks with supervision level of assistance and PRN adaptive equipment while standing. (Met)       Start:  11/26/24    Expected End:  12/26/24    Resolved:  11/27/24         Patient will  complete toileting including hygiene clothing management/hygiene with supervision level of assistance and grab bars. (Progressing)       Start:  11/26/24    Expected End:  12/26/24               MOBILITY       Patient will perform Functional mobility max Household distances/Community Distances with supervision level of assistance and least restrictive device in order to improve safety and functional mobility. (Progressing)       Start:  11/26/24    Expected End:  12/26/24

## 2024-11-28 LAB
ANION GAP SERPL CALCULATED.3IONS-SCNC: 9 MMOL/L (ref 10–20)
ATRIAL RATE: 63 BPM
BUN SERPL-MCNC: 18 MG/DL (ref 6–23)
CALCIUM SERPL-MCNC: 9.4 MG/DL (ref 8.6–10.3)
CHLORIDE SERPL-SCNC: 104 MMOL/L (ref 98–107)
CO2 SERPL-SCNC: 29 MMOL/L (ref 21–32)
CREAT SERPL-MCNC: 0.63 MG/DL (ref 0.5–1.3)
EGFRCR SERPLBLD CKD-EPI 2021: >90 ML/MIN/1.73M*2
ERYTHROCYTE [DISTWIDTH] IN BLOOD BY AUTOMATED COUNT: 13.2 % (ref 11.5–14.5)
GLUCOSE BLD MANUAL STRIP-MCNC: 211 MG/DL (ref 74–99)
GLUCOSE BLD MANUAL STRIP-MCNC: 257 MG/DL (ref 74–99)
GLUCOSE BLD MANUAL STRIP-MCNC: 368 MG/DL (ref 74–99)
GLUCOSE BLD MANUAL STRIP-MCNC: 422 MG/DL (ref 74–99)
GLUCOSE SERPL-MCNC: 223 MG/DL (ref 74–99)
HCT VFR BLD AUTO: 40.7 % (ref 41–52)
HGB BLD-MCNC: 14.1 G/DL (ref 13.5–17.5)
MCH RBC QN AUTO: 32 PG (ref 26–34)
MCHC RBC AUTO-ENTMCNC: 34.6 G/DL (ref 32–36)
MCV RBC AUTO: 92 FL (ref 80–100)
NRBC BLD-RTO: 0 /100 WBCS (ref 0–0)
P AXIS: 53 DEGREES
P OFFSET: 195 MS
P ONSET: 142 MS
PLATELET # BLD AUTO: 211 X10*3/UL (ref 150–450)
POTASSIUM SERPL-SCNC: 4.2 MMOL/L (ref 3.5–5.3)
PR INTERVAL: 152 MS
Q ONSET: 218 MS
QRS COUNT: 11 BEATS
QRS DURATION: 90 MS
QT INTERVAL: 412 MS
QTC CALCULATION(BAZETT): 421 MS
QTC FREDERICIA: 418 MS
R AXIS: 80 DEGREES
RBC # BLD AUTO: 4.41 X10*6/UL (ref 4.5–5.9)
SODIUM SERPL-SCNC: 138 MMOL/L (ref 136–145)
T AXIS: 74 DEGREES
T OFFSET: 424 MS
VENTRICULAR RATE: 63 BPM
WBC # BLD AUTO: 6.2 X10*3/UL (ref 4.4–11.3)

## 2024-11-28 PROCEDURE — 85027 COMPLETE CBC AUTOMATED: CPT | Performed by: INTERNAL MEDICINE

## 2024-11-28 PROCEDURE — 94640 AIRWAY INHALATION TREATMENT: CPT

## 2024-11-28 PROCEDURE — 2500000002 HC RX 250 W HCPCS SELF ADMINISTERED DRUGS (ALT 637 FOR MEDICARE OP, ALT 636 FOR OP/ED): Performed by: NURSE PRACTITIONER

## 2024-11-28 PROCEDURE — 82947 ASSAY GLUCOSE BLOOD QUANT: CPT

## 2024-11-28 PROCEDURE — 36415 COLL VENOUS BLD VENIPUNCTURE: CPT | Performed by: INTERNAL MEDICINE

## 2024-11-28 PROCEDURE — 94668 MNPJ CHEST WALL SBSQ: CPT

## 2024-11-28 PROCEDURE — 2500000001 HC RX 250 WO HCPCS SELF ADMINISTERED DRUGS (ALT 637 FOR MEDICARE OP): Performed by: NURSE PRACTITIONER

## 2024-11-28 PROCEDURE — 97530 THERAPEUTIC ACTIVITIES: CPT | Mod: GO,CO

## 2024-11-28 PROCEDURE — 80048 BASIC METABOLIC PNL TOTAL CA: CPT | Performed by: INTERNAL MEDICINE

## 2024-11-28 PROCEDURE — 99232 SBSQ HOSP IP/OBS MODERATE 35: CPT | Performed by: STUDENT IN AN ORGANIZED HEALTH CARE EDUCATION/TRAINING PROGRAM

## 2024-11-28 PROCEDURE — 2500000004 HC RX 250 GENERAL PHARMACY W/ HCPCS (ALT 636 FOR OP/ED): Performed by: STUDENT IN AN ORGANIZED HEALTH CARE EDUCATION/TRAINING PROGRAM

## 2024-11-28 PROCEDURE — 2500000002 HC RX 250 W HCPCS SELF ADMINISTERED DRUGS (ALT 637 FOR MEDICARE OP, ALT 636 FOR OP/ED): Performed by: INTERNAL MEDICINE

## 2024-11-28 PROCEDURE — 1200000002 HC GENERAL ROOM WITH TELEMETRY DAILY

## 2024-11-28 PROCEDURE — 2500000004 HC RX 250 GENERAL PHARMACY W/ HCPCS (ALT 636 FOR OP/ED): Performed by: NURSE PRACTITIONER

## 2024-11-28 PROCEDURE — 2500000001 HC RX 250 WO HCPCS SELF ADMINISTERED DRUGS (ALT 637 FOR MEDICARE OP): Performed by: STUDENT IN AN ORGANIZED HEALTH CARE EDUCATION/TRAINING PROGRAM

## 2024-11-28 RX ORDER — DEXTROSE 50 % IN WATER (D50W) INTRAVENOUS SYRINGE
12.5
Status: DISCONTINUED | OUTPATIENT
Start: 2024-11-28 | End: 2024-11-29 | Stop reason: HOSPADM

## 2024-11-28 RX ORDER — INSULIN LISPRO 100 [IU]/ML
0-5 INJECTION, SOLUTION INTRAVENOUS; SUBCUTANEOUS
Status: DISCONTINUED | OUTPATIENT
Start: 2024-11-28 | End: 2024-11-29 | Stop reason: HOSPADM

## 2024-11-28 RX ORDER — GLIPIZIDE 5 MG/1
5 TABLET ORAL
Status: DISCONTINUED | OUTPATIENT
Start: 2024-11-29 | End: 2024-11-29 | Stop reason: HOSPADM

## 2024-11-28 RX ORDER — METFORMIN HYDROCHLORIDE 1000 MG/1
1000 TABLET ORAL
Status: DISCONTINUED | OUTPATIENT
Start: 2024-11-28 | End: 2024-11-29 | Stop reason: HOSPADM

## 2024-11-28 RX ORDER — INSULIN LISPRO 100 [IU]/ML
3 INJECTION, SOLUTION INTRAVENOUS; SUBCUTANEOUS ONCE
Status: COMPLETED | OUTPATIENT
Start: 2024-11-28 | End: 2024-11-28

## 2024-11-28 RX ORDER — DEXTROSE 50 % IN WATER (D50W) INTRAVENOUS SYRINGE
25
Status: DISCONTINUED | OUTPATIENT
Start: 2024-11-28 | End: 2024-11-29 | Stop reason: HOSPADM

## 2024-11-28 ASSESSMENT — PAIN SCALES - GENERAL
PAINLEVEL_OUTOF10: 0 - NO PAIN

## 2024-11-28 ASSESSMENT — COGNITIVE AND FUNCTIONAL STATUS - GENERAL
WALKING IN HOSPITAL ROOM: A LITTLE
MOBILITY SCORE: 20
DRESSING REGULAR LOWER BODY CLOTHING: A LITTLE
MOVING TO AND FROM BED TO CHAIR: A LITTLE
TOILETING: A LITTLE
DAILY ACTIVITIY SCORE: 21
DAILY ACTIVITIY SCORE: 24
DAILY ACTIVITIY SCORE: 24
STANDING UP FROM CHAIR USING ARMS: A LITTLE
MOBILITY SCORE: 20
CLIMB 3 TO 5 STEPS WITH RAILING: A LITTLE
CLIMB 3 TO 5 STEPS WITH RAILING: A LITTLE
WALKING IN HOSPITAL ROOM: A LITTLE
HELP NEEDED FOR BATHING: A LITTLE
STANDING UP FROM CHAIR USING ARMS: A LITTLE
MOVING TO AND FROM BED TO CHAIR: A LITTLE

## 2024-11-28 ASSESSMENT — PAIN - FUNCTIONAL ASSESSMENT
PAIN_FUNCTIONAL_ASSESSMENT: 0-10
PAIN_FUNCTIONAL_ASSESSMENT: FLACC (FACE, LEGS, ACTIVITY, CRY, CONSOLABILITY)
PAIN_FUNCTIONAL_ASSESSMENT: FLACC (FACE, LEGS, ACTIVITY, CRY, CONSOLABILITY)

## 2024-11-28 NOTE — NURSING NOTE
Rounded on pt. Pt laying in bed, eyes closed, respirations even and unlabored. Pt appears to be sleeping and in no apparent pain or distress. Uneventful night with no changes since prior assessment. Call bell and belongings are placed in reach. Telemetry leads connected and reading on monitor. NC O2 in place and connected to flowmeter. Bed alarm refused. Continuing to monitor.

## 2024-11-28 NOTE — NURSING NOTE
Dr. Norris in to see patient. He wanted to see what the patient pox was on RA. After 5 minutes off of oxygen, he was checked. His pox was 90-94% on RA.

## 2024-11-28 NOTE — PROGRESS NOTES
Aaron Shaw is a 69 y.o. male on day 3 of admission presenting with Altered mental status, unspecified altered mental status type.      Subjective   Patient stressed''about smoking,I did not have a chance to quit''       Objective     Last Recorded Vitals  /72 (BP Location: Right arm, Patient Position: Lying)   Pulse 67   Temp 36.4 °C (97.5 °F) (Oral)   Resp 16   Wt 78.1 kg (172 lb 2.9 oz)   SpO2 94%   Intake/Output last 3 Shifts:    Intake/Output Summary (Last 24 hours) at 11/28/2024 1056  Last data filed at 11/28/2024 0829  Gross per 24 hour   Intake 730 ml   Output --   Net 730 ml       Admission Weight  Weight: 72.6 kg (160 lb) (11/25/24 1330)    Daily Weight  11/28/24 : 78.1 kg (172 lb 2.9 oz)    Image Results  CT chest wo IV contrast  Narrative: Interpreted By:  Tr Dickerson,   STUDY:  CT CHEST WO IV CONTRAST; ;  11/26/2024 12:25 pm      INDICATION:  Signs/Symptoms:lung congestion, cough,nodule?.          COMPARISON:  07/21/2020      ACCESSION NUMBER(S):  [id number]      ORDERING CLINICIAN:  JOHNY WESTFALL      TECHNIQUE:  Serial axial unenhanced CT images obtained of the chest. Images  reformatted in the coronal and sagittal projection      All CT examinations are performed with 1 or more of the following  dose reduction techniques: Automated exposure control, adjustment of  mA and/or kv according to patient's size, or use of iterative  reconstruction techniques.      FINDINGS:  Mediastinum demonstrates scattered subcentimeter paratracheal lymph  nodes. There is precarinal lymphadenopathy identified measuring 9 x  12 mm. Findings are nonspecific. No hilar lymphadenopathy with  scattered subcentimeter lymph nodes demonstrated. Esophagus is  unremarkable      Heart and great vessels demonstrate moderate vascular calcification  of the thoracic aorta. Ascending thoracic aorta measures 3.4 cm main  pulmonary artery measures 3.3 cm with mild dilatation. No cardiomegaly      Lung parenchyma  demonstrates diffuse emphysematous changes. There is  a chronic appearing septal thickening there is nodularity  demonstrated along the minor fissure measuring 6 mm (series 6, image  217). Linear appearing scar and ground-glass airspace infiltrate at  the right lung apex laterally with surrounding pleural thickening.  This is an area of previously noted large apical bulla. Component of  previously described findings likely relate to underlying scar or  even potential infiltrate. No consolidation. Left lung demonstrates  no focal infiltrate. There is scar along the lingula. No pleural  effusions.      Liver demonstrates no focal abnormality visualized portions within  limits of this unenhanced CT. Remainder of the visualized abdomen  demonstrates nodularity within the adrenal glands. For example, left  adrenal gland nodule measuring 2.7 cm with average Hounsfield unit 3  in keeping with adenoma.      Visualized osseous structures demonstrate mild multilevel anterior  osteophyte formation mid to lower thoracic spine. No compression  deformity demonstrated.                      Impression: 1. Diffuse emphysematous changes demonstrated with subpleural  reticulation and scar in the right upper lung zone laterally.  Superimposed component of infiltrate in this areas not excluded  without dense airspace consolidation. There is a linear configuration  of the area of presumed infiltrate.      2. There is a 6 mm pulmonary nodule along the minor fissure with  follow-up CT chest recommended in 6 months to document stability.      3. Adrenal nodularity with underlying adenoma                  MACRO:  None      Signed by: Tr Dickerson 11/27/2024 8:20 AM  Dictation workstation:   [id number]      Physical Exam/lungs better    Relevant Results               Assessment/Plan                  Assessment & Plan  Altered mental status, unspecified altered mental status type    Abdominal  pain    Hypomagnesemia    Nausea    Weakness    Type 2 diabetes mellitus with circulatory disorder, without long-term current use of insulin    Hypertension    Tobacco abuse    Pulmonary emphysema (Multi)    Discussed smoking cessation, o2               Neva Stoddard MD

## 2024-11-28 NOTE — NURSING NOTE
Took over care of pt at this time. Pt laying in bed, alert, aox4. Pt denies pain and has no needs or complaints at this time. Pt oriented to call bell and it and belongings are placed in reach. Telemetry leads connected and reading on monitor. Bed alarm refused. NC O2 in place and connected to flowmeter. Continuing to monitor.

## 2024-11-28 NOTE — NURSING NOTE
Rounded on pt. Pt laying in bed, eyes closed, respirations even and unlabored. Pt appears to be sleeping and in no apparent pain or distress. Call bell and belongings are placed in reach. Telemetry leads connected and reading on monitor. NC O2 in place and connected to flowmeter. Bed alarm refused. Continuing to monitor.

## 2024-11-28 NOTE — CARE PLAN
Problem: Safety - Adult  Goal: Free from fall injury  Outcome: Progressing     Problem: Discharge Planning  Goal: Discharge to home or other facility with appropriate resources  Outcome: Progressing     Problem: Chronic Conditions and Co-morbidities  Goal: Patient's chronic conditions and co-morbidity symptoms are monitored and maintained or improved  Outcome: Progressing     Problem: Diabetes  Goal: Achieve decreasing blood glucose levels by end of shift  Outcome: Progressing  Goal: Increase stability of blood glucose readings by end of shift  Outcome: Progressing  Goal: Decrease in ketones present in urine by end of shift  Outcome: Progressing  Goal: Maintain electrolyte levels within acceptable range throughout shift  Outcome: Progressing  Goal: Maintain glucose levels >70mg/dl to <250mg/dl throughout shift  Outcome: Progressing  Goal: No changes in neurological exam by end of shift  Outcome: Progressing  Goal: Learn about and adhere to nutrition recommendations by end of shift  Outcome: Progressing  Goal: Vital signs within normal range for age by end of shift  Outcome: Progressing  Goal: Increase self care and/or family involovement by end of shift  Outcome: Progressing  Goal: Receive DSME education by end of shift  Outcome: Progressing     Problem: Pain  Goal: Takes deep breaths with improved pain control throughout the shift  Outcome: Progressing  Goal: Turns in bed with improved pain control throughout the shift  Outcome: Progressing  Goal: Walks with improved pain control throughout the shift  Outcome: Progressing  Goal: Performs ADL's with improved pain control throughout shift  Outcome: Progressing  Goal: Participates in PT with improved pain control throughout the shift  Outcome: Progressing  Goal: Free from opioid side effects throughout the shift  Outcome: Progressing  Goal: Free from acute confusion related to pain meds throughout the shift  Outcome: Progressing   The patient's goals for the shift  include      The clinical goals for the shift include no falls, maintain pulse ox 92 or greater    Over the shift, the patient did make progress toward the goals.

## 2024-11-28 NOTE — PROGRESS NOTES
"Pulmonary Daily Progress Note   Subjective    Aaron Shaw is a 69 y.o. year old male patient known with 69 y.o. male with PMHX significant for  COPD/ emphysema (no PFT on chart, on spiriva, no Pulm OP doctor), chronic pancreatitis, DJD, DM2, diabetic neuropathy, HLD, HTN, SSC of skiin, tobacco abuse, alcohol abuse, illicit drug abuse, pulmonary nodule, DJD, and low back pain presented to the emergency department for mental status change and abdominal pain.  admitted on 11/25/2024 with following abdominal pain and AMS. Pulmonary consulted for emphysema, lung congestion and lung nodule     Interval History:  Patient mentions improvement since yesterday/remains to have a cough but denies shortness of breath or chest pain.  Oxygen was provided for him according to RT      Meds    Scheduled medications  doxycycline, 100 mg, oral, q12h BASIM  enoxaparin, 40 mg, subcutaneous, Daily  insulin lispro, 0-5 Units, subcutaneous, q4h  ipratropium-albuteroL, 3 mL, nebulization, TID  lisinopril, 5 mg, oral, Daily  nicotine, 1 patch, transdermal, Daily  pantoprazole, 20 mg, intravenous, Daily  predniSONE, 20 mg, oral, Daily  pregabalin, 300 mg, oral, BID      Continuous medications     PRN medications  PRN medications: acetaminophen **OR** acetaminophen **OR** acetaminophen, albuterol, morphine, ondansetron **OR** ondansetron     Objective    Blood pressure 142/72, pulse 67, temperature 36.4 °C (97.5 °F), temperature source Oral, resp. rate 16, height 1.829 m (6' 0.01\"), weight 78.1 kg (172 lb 2.9 oz), SpO2 94%.   Physical Exam   GENERAL: No respiratory distress  HEAD/SINUSES: On 2 L oxygen  LUNGS: Rhonchi and wheezing bilaterally improve since yesterday   CARDIAC:Regular rate and rhythm  EXTREMITIES: No edema,  NEURO: grossly normal mental status  SKIN: Skin turgor normal.  PSYCH: Normal affect    Intake/Output Summary (Last 24 hours) at 11/28/2024 0992  Last data filed at 11/27/2024 1400  Gross per 24 hour   Intake 490 ml "   Output --   Net 490 ml     Labs:   Results from last 72 hours   Lab Units 11/28/24  0545 11/27/24  0510 11/26/24  0516   SODIUM mmol/L 138 135* 137   POTASSIUM mmol/L 4.2 4.6 4.0   CHLORIDE mmol/L 104 101 100   CO2 mmol/L 29 30 31   BUN mg/dL 18 13 12   CREATININE mg/dL 0.63 0.68 0.67   GLUCOSE mg/dL 223* 237* 156*   CALCIUM mg/dL 9.4 9.3 9.5   ANION GAP mmol/L 9* 9* 10   EGFR mL/min/1.73m*2 >90 >90 >90      Results from last 72 hours   Lab Units 11/28/24  0545 11/27/24  0511 11/26/24  0516 11/25/24  1403   WBC AUTO x10*3/uL 6.2 6.3 10.5 7.6   HEMOGLOBIN g/dL 14.1 14.0 14.9 15.3   HEMATOCRIT % 40.7* 40.6* 42.7 44.2   PLATELETS AUTO x10*3/uL 211 207 231 243   NEUTROS PCT AUTO %  --   --  77.9 80.7   LYMPHS PCT AUTO %  --   --  11.8 13.3   MONOS PCT AUTO %  --   --  9.3 5.4   EOS PCT AUTO %  --   --  0.4 0.0          Results from last 72 hours   Lab Units 11/25/24  1403   POCT PH, VENOUS pH 7.43   POCT PCO2, VENOUS mm Hg 42   POCT PO2, VENOUS mm Hg 44      Micro/ID:   Lab Results   Component Value Date    BLOODCULT No growth at 4 days -  FINAL REPORT 04/11/2024    BLOODCULT No growth at 4 days -  FINAL REPORT 04/11/2024     Summary of key imaging results from the last 24 hours  Chest x-ray right upper lobe opacification versus scarring  CT chest has not been read yet however demonstrates severe emphysema, infiltrates  in the right lower lobe, right upper lobe scarring, Official read with fissural nodule 6 mm     Impression   Aarontraci Shaw is a 69 y.o. year old male patient known with 69 y.o. male with PMHX significant for  COPD/ emphysema (no PFT on chart, on spiriva, no Pulm OP doctor), chronic pancreatitis, DJD, DM2, diabetic neuropathy, HLD, HTN, SSC of skiin, tobacco abuse, alcohol abuse, illicit drug abuse, pulmonary nodule, DJD, and low back pain presented to the emergency department for mental status change and abdominal pain.  admitted on 11/25/2024 with following abdominal pain and AMS. Pulmonary  consulted for emphysema, lung congestion and lung nodule   Acute hypoxic respiratory failure 2 L of oxygen due to severe emphysema, COPD exacerbation, aspiration versus bronchopneumonia picture  COPD//emphysema no PFTs on chart, no pulmonologist, on Spiriva, currently in exacerbation  Heavy smoker  Right upper lobe scarring most probably caused by pneumonia in April  Bronchopneumonia vs  aspiration from vomiting  Fissure lung nodule    Recommendations   As follows:  AECOPD management with prednisone for 5 days (prescribed lower dose because of gastritis), scheduled aerosol treatment, and doxycycline for 5 days.   sputum culture pending however discontinued ceftriaxone as procalcitonin is negative and doxycycline should be sufficient by itself,   Patient qualified for home oxygen, 2 L of oxygen on rest and exertion yesterday. Patient will need close follow up to assess his oxygen needs. Patient is agreeable to follow and is aware to quit smoking and not to smoke next to oxygen   bronchopulmonary hygiene with Incentive spirometry and Acapella   Smoking cessation  Could consider SLP evaluation  Patient will need outpatient pulmonary follow-up for PFTs and COPD management  Once ready for discharge patient can be sent on LAMA/LABA combination , recommend stiolto 2 puffs daily instead of spiriva if covered by insurance, with albuterol HFA (for outside the house) and nebulizer (for home therapy) PRN every 4-6 hours  Patient will need repeat imaging in 6 to 8 weeks to ensure resolution of infiltrates and follow-up on right upper lobe scarring and follow up on the fissural lung nodule.         Katelyn Montes MD   11/28/24 at 9:35 AM     -Only the Medical problems listed under impression were addressed today.   -Please contact primary team for all other concerns and medical problem  -Thank you for your consult       Disclaimer: Documentation completed with the information available at the time of input. Parts of this note  may have been scribed or generated using voice dictation software, Dragon.  Homophonic errors may exist.  Please contact me directly if clarification is needed. The times in the chart may not be reflective of actual patient care times, interventions, or procedures. Documentation occurs after the physical care of the patient.

## 2024-11-28 NOTE — CARE PLAN
The patient's goals for the shift include      The clinical goals for the shift include no falls, maintain pulse ox 92 or greater      Problem: Safety - Adult  Goal: Free from fall injury  Outcome: Progressing     Problem: Discharge Planning  Goal: Discharge to home or other facility with appropriate resources  Outcome: Progressing     Problem: Chronic Conditions and Co-morbidities  Goal: Patient's chronic conditions and co-morbidity symptoms are monitored and maintained or improved  Outcome: Progressing     Problem: Diabetes  Goal: Achieve decreasing blood glucose levels by end of shift  Outcome: Progressing  Goal: Increase stability of blood glucose readings by end of shift  Outcome: Progressing  Goal: Decrease in ketones present in urine by end of shift  Outcome: Progressing  Goal: Maintain electrolyte levels within acceptable range throughout shift  Outcome: Progressing  Goal: Maintain glucose levels >70mg/dl to <250mg/dl throughout shift  Outcome: Progressing  Goal: No changes in neurological exam by end of shift  Outcome: Progressing  Goal: Learn about and adhere to nutrition recommendations by end of shift  Outcome: Progressing  Goal: Vital signs within normal range for age by end of shift  Outcome: Progressing  Goal: Increase self care and/or family involovement by end of shift  Outcome: Progressing  Goal: Receive DSME education by end of shift  Outcome: Progressing     Problem: Pain  Goal: Takes deep breaths with improved pain control throughout the shift  Outcome: Progressing  Goal: Turns in bed with improved pain control throughout the shift  Outcome: Progressing  Goal: Walks with improved pain control throughout the shift  Outcome: Progressing  Goal: Performs ADL's with improved pain control throughout shift  Outcome: Progressing  Goal: Participates in PT with improved pain control throughout the shift  Outcome: Progressing  Goal: Free from opioid side effects throughout the shift  Outcome:  Progressing  Goal: Free from acute confusion related to pain meds throughout the shift  Outcome: Progressing

## 2024-11-28 NOTE — PROGRESS NOTES
Occupational Therapy    OT Treatment    Patient Name: Aaron Shaw  MRN: [id number]  Department: [address]  Room: [facility]-  Today's Date: 11/28/2024  Time Calculation  Start Time: 1033  Stop Time: 1050  Time Calculation (min): 17 min        Assessment:  OT Assessment: Fair progress made towards OT goals. Continue with current OT POC to increase strength, balance and functional tolerance to maximize safety and independence during ADLs.  Barriers to Discharge: Decreased caregiver support  Evaluation/Treatment Tolerance: Patient limited by fatigue  End of Session Communication: Bedside nurse  End of Session Patient Position: Bed, 3 rail up, Alarm off, not on at start of session (all needs in reach, RN aware of no alarm)  OT Assessment Results: Decreased ADL status, Decreased safe judgment during ADL, Decreased cognition, Decreased endurance, Decreased functional mobility  Barriers to Discharge: Decreased caregiver support  Evaluation/Treatment Tolerance: Patient limited by fatigue  Plan:  Treatment Interventions: ADL retraining, Functional transfer training, UE strengthening/ROM, Endurance training, Equipment evaluation/education, Compensatory technique education  OT Frequency: 4 times per week  OT Discharge Recommendations: Moderate intensity level of continued care  Equipment Recommended upon Discharge: Wheeled walker  OT Recommended Transfer Status: Assist of 1, Minimal assist  OT - OK to Discharge: Yes  Treatment Interventions: ADL retraining, Functional transfer training, UE strengthening/ROM, Endurance training, Equipment evaluation/education, Compensatory technique education    Subjective   Previous Visit Info:  OT Last Visit  OT Received On: 11/28/24  General:  General  Reason for Referral: ADL impairment; 69 y.o. male presenting with abdominal pain and AMS.  In ED he had Hypomagnesemia, CT a/p showing gastric and sigmoid wall thickening.  Past Medical History Relevant to Rehab: COPD, chronic pancreatitis, DJD,  "DM2, diabetic neuropathy, HLD, HTN, SSC of skin, tobacco abuse, alcohol abuse, illicit drug abuse, pulmonary nodule, DJD, and low back pain  Prior to Session Communication: Bedside nurse  Patient Position Received: Bed, 3 rail up, Alarm off, not on at start of session  General Comment: Pt cleared for OT session per nursing, cooperative with encouragement.  Precautions:  Hearing/Visual Limitations: reading glasses  Medical Precautions: Fall precautions, Oxygen therapy device and L/min (2L O2 via NC)            Pain:  Pain Assessment  Pain Assessment: 0-10  0-10 (Numeric) Pain Score: 0 - No pain  Clinical Progression: Not changed    Objective    Cognition:  Cognition  Overall Cognitive Status: Impaired  Orientation Level: Oriented X4  Following Commands: Follows one step commands with increased time  Safety Judgment: Decreased awareness of need for safety precautions  Cognition Comments: Mildly impulsive, decreased awareness of deficits  Safety/Judgement: Exceptions to WFL  Insight: Mild  Impulsive: Mildly  Task Initiation: Initiates with cues  Processing Speed: Delayed  Coordination:  Movements are Fluid and Coordinated: Yes  Activities of Daily Living: Grooming  Grooming Comments: pt declined participation in ADL tasks despite education and encouragement reporting \"I can do what I need to and I already did that this morning\".    LE Dressing  LE Dressing: Yes  LE Dressing Comments: pt able to don socks seated EOB at modified independent level using figure-four technique.  Functional Standing Tolerance:     Bed Mobility/Transfers: Bed Mobility  Bed Mobility: Yes  Bed Mobility 1  Bed Mobility 1: Supine to sitting, Sitting to supine  Level of Assistance 1: Modified independent  Bed Mobility Comments 1: HOB minimally elevated    Transfers  Transfer: Yes  Transfer 1  Trials/Comments 1: sit<>stands completed from standard EOB height with close supervision- pt intermittently impulsive requiring verbal cues for self-pacing " to decrease risk of falls.      Functional Mobility:  Functional Mobility  Functional Mobility Performed: Yes  Functional Mobility 1  Surface 1: Level tile  Device 1: No device  Assistance 1: Contact guard  Comments 1: Functional mobility trials completed at community distances to increase functional tolerance and assess safety awareness to increase overall safety and independence prior to home-going. Verbal/ tactile cues required for O2 line management to increase safety and decrease risk of falls. Recommend continued trials to ensure pt understanding and carry-over.    Therapy/Activity: Therapeutic Activity  Therapeutic Activity Performed: Yes  Therapeutic Activity 1: Functional transfers/ mobility trials completed with close supervision and min verbal cues for O2 line management. Review of turning with O2 line to decrease risk of falls. Pt able to verbalize and demonstrate understanding. Recommend continued trials to assess carry-over prior to discharge to decrease risk of falls.    Other Activity:  Other Activity Performed: Yes  Other Activity 1: EC/WS education provided and reviewed.      Outcome Measures:[facility] Daily Activity  Putting on and taking off regular lower body clothing: A little  Bathing (including washing, rinsing, drying): A little  Putting on and taking off regular upper body clothing: None  Toileting, which includes using toilet, bedpan or urinal: A little  Taking care of personal grooming such as brushing teeth: None  Eating Meals: None  Daily Activity - Total Score: 21        Education Documentation  Body Mechanics, taught by MARSHA Santos at 11/28/2024  1:57 PM.  Learner: Patient  Readiness: Acceptance  Method: Explanation, Demonstration  Response: Needs Reinforcement    ADL Training, taught by MARSHA Santos at 11/28/2024  1:57 PM.  Learner: Patient  Readiness: Acceptance  Method: Explanation, Demonstration  Response: Needs Reinforcement    Education Comments  Education  provided on role of OT/POC, safety awareness throughout functional tasks/transfers, importance of activity/ rest routine, EC/WS techniques, and use of call light for assistance. Questions, comments and concerns addressed regarding OT.      Goals:  Encounter Problems       Encounter Problems (Active)       ADLs       Patient with complete upper body dressing with supervision level of assistance donning and doffing all UE clothes while edge of bed  (Progressing)       Start:  11/26/24    Expected End:  12/26/24            Patient with complete lower body dressing with supervision level of assistance donning all LE clothes  with PRN adaptive equipment while edge of bed  (Progressing)       Start:  11/26/24    Expected End:  12/26/24            Patient will complete daily grooming tasks with supervision level of assistance and PRN adaptive equipment while standing. (Met)       Start:  11/26/24    Expected End:  12/26/24    Resolved:  11/27/24         Patient will complete toileting including hygiene clothing management/hygiene with supervision level of assistance and grab bars. (Progressing)       Start:  11/26/24    Expected End:  12/26/24               MOBILITY       Patient will perform Functional mobility max Household distances/Community Distances with supervision level of assistance and least restrictive device in order to improve safety and functional mobility. (Progressing)       Start:  11/26/24    Expected End:  12/26/24

## 2024-11-28 NOTE — NURSING NOTE
Dr. Stoddard was contacted regarding patient discharge. She stated that the oncall would have to place the discharge order for her. She had me send a chat to Dipti Burks so that she could complete order.

## 2024-11-29 ENCOUNTER — PHARMACY VISIT (OUTPATIENT)
Dept: PHARMACY | Facility: CLINIC | Age: 69
End: 2024-11-29

## 2024-11-29 VITALS
HEART RATE: 68 BPM | BODY MASS INDEX: 23.32 KG/M2 | WEIGHT: 172.18 LBS | OXYGEN SATURATION: 93 % | RESPIRATION RATE: 17 BRPM | SYSTOLIC BLOOD PRESSURE: 147 MMHG | TEMPERATURE: 97.5 F | DIASTOLIC BLOOD PRESSURE: 65 MMHG | HEIGHT: 72 IN

## 2024-11-29 LAB
BACTERIA SPEC RESP CULT: NORMAL
GLUCOSE BLD MANUAL STRIP-MCNC: 187 MG/DL (ref 74–99)
GRAM STN SPEC: NORMAL
GRAM STN SPEC: NORMAL

## 2024-11-29 PROCEDURE — 2500000004 HC RX 250 GENERAL PHARMACY W/ HCPCS (ALT 636 FOR OP/ED): Performed by: STUDENT IN AN ORGANIZED HEALTH CARE EDUCATION/TRAINING PROGRAM

## 2024-11-29 PROCEDURE — 94640 AIRWAY INHALATION TREATMENT: CPT

## 2024-11-29 PROCEDURE — 82947 ASSAY GLUCOSE BLOOD QUANT: CPT

## 2024-11-29 PROCEDURE — 2500000001 HC RX 250 WO HCPCS SELF ADMINISTERED DRUGS (ALT 637 FOR MEDICARE OP): Performed by: NURSE PRACTITIONER

## 2024-11-29 PROCEDURE — 2500000002 HC RX 250 W HCPCS SELF ADMINISTERED DRUGS (ALT 637 FOR MEDICARE OP, ALT 636 FOR OP/ED): Performed by: INTERNAL MEDICINE

## 2024-11-29 PROCEDURE — RXMED WILLOW AMBULATORY MEDICATION CHARGE

## 2024-11-29 PROCEDURE — 2500000001 HC RX 250 WO HCPCS SELF ADMINISTERED DRUGS (ALT 637 FOR MEDICARE OP): Performed by: STUDENT IN AN ORGANIZED HEALTH CARE EDUCATION/TRAINING PROGRAM

## 2024-11-29 PROCEDURE — 2500000004 HC RX 250 GENERAL PHARMACY W/ HCPCS (ALT 636 FOR OP/ED): Performed by: NURSE PRACTITIONER

## 2024-11-29 RX ORDER — FLUTICASONE FUROATE AND VILANTEROL 200; 25 UG/1; UG/1
1 POWDER RESPIRATORY (INHALATION)
Qty: 60 EACH | Refills: 0 | Status: SHIPPED | OUTPATIENT
Start: 2024-11-29

## 2024-11-29 RX ORDER — DOXYCYCLINE 100 MG/1
100 CAPSULE ORAL EVERY 12 HOURS SCHEDULED
Qty: 14 CAPSULE | Refills: 0 | Status: SHIPPED | OUTPATIENT
Start: 2024-11-29

## 2024-11-29 RX ORDER — FLUTICASONE FUROATE AND VILANTEROL 200; 25 UG/1; UG/1
1 POWDER RESPIRATORY (INHALATION)
Status: DISCONTINUED | OUTPATIENT
Start: 2024-11-30 | End: 2024-11-29 | Stop reason: HOSPADM

## 2024-11-29 RX ORDER — PREDNISONE 20 MG/1
20 TABLET ORAL DAILY
Qty: 2 TABLET | Refills: 0 | Status: SHIPPED | OUTPATIENT
Start: 2024-11-30 | End: 2024-12-02

## 2024-11-29 RX ORDER — IPRATROPIUM BROMIDE AND ALBUTEROL SULFATE 2.5; .5 MG/3ML; MG/3ML
3 SOLUTION RESPIRATORY (INHALATION)
Qty: 90 ML | Refills: 0 | Status: SHIPPED | OUTPATIENT
Start: 2024-11-29 | End: 2024-11-29 | Stop reason: HOSPADM

## 2024-11-29 ASSESSMENT — COGNITIVE AND FUNCTIONAL STATUS - GENERAL
DAILY ACTIVITIY SCORE: 24
MOBILITY SCORE: 24

## 2024-11-29 ASSESSMENT — PAIN - FUNCTIONAL ASSESSMENT: PAIN_FUNCTIONAL_ASSESSMENT: 0-10

## 2024-11-29 ASSESSMENT — PAIN SCALES - GENERAL: PAINLEVEL_OUTOF10: 0 - NO PAIN

## 2024-11-29 NOTE — CARE PLAN
The patient's goals for the shift include      The clinical goals for the shift include stable SPO2, safety      Problem: Safety - Adult  Goal: Free from fall injury  Outcome: Progressing     Problem: Discharge Planning  Goal: Discharge to home or other facility with appropriate resources  Outcome: Progressing     Problem: Chronic Conditions and Co-morbidities  Goal: Patient's chronic conditions and co-morbidity symptoms are monitored and maintained or improved  Outcome: Progressing     Problem: Diabetes  Goal: Achieve decreasing blood glucose levels by end of shift  Outcome: Progressing  Goal: Increase stability of blood glucose readings by end of shift  Outcome: Progressing  Goal: Decrease in ketones present in urine by end of shift  Outcome: Progressing  Goal: Maintain electrolyte levels within acceptable range throughout shift  Outcome: Progressing  Goal: Maintain glucose levels >70mg/dl to <250mg/dl throughout shift  Outcome: Progressing  Goal: No changes in neurological exam by end of shift  Outcome: Progressing  Goal: Learn about and adhere to nutrition recommendations by end of shift  Outcome: Progressing  Goal: Vital signs within normal range for age by end of shift  Outcome: Progressing  Goal: Increase self care and/or family involovement by end of shift  Outcome: Progressing  Goal: Receive DSME education by end of shift  Outcome: Progressing     Problem: Pain  Goal: Takes deep breaths with improved pain control throughout the shift  Outcome: Progressing  Goal: Turns in bed with improved pain control throughout the shift  Outcome: Progressing  Goal: Walks with improved pain control throughout the shift  Outcome: Progressing  Goal: Performs ADL's with improved pain control throughout shift  Outcome: Progressing  Goal: Participates in PT with improved pain control throughout the shift  Outcome: Progressing  Goal: Free from opioid side effects throughout the shift  Outcome: Progressing  Goal: Free from acute  confusion related to pain meds throughout the shift  Outcome: Progressing     Problem: Respiratory  Goal: Minimize anxiety/maximize coping throughout shift  Outcome: Progressing  Goal: Minimal/no exertional discomfort or dyspnea this shift  Outcome: Progressing  Goal: No signs of respiratory distress (eg. Use of accessory muscles. Peds grunting)  Outcome: Progressing  Goal: Verbalize decreased shortness of breath this shift  Outcome: Progressing  Goal: Wean oxygen to maintain O2 saturation per order/standard this shift  Outcome: Progressing

## 2024-11-29 NOTE — NURSING NOTE
Pt son here to take pt home. All belongings returned, meds to bed delivered. Pt instructed on [facility]

## 2024-11-29 NOTE — CARE PLAN
The patient's goals for the shift include      The clinical goals for the shift include stable SPO2, safety    Over the shift, the patient did make progress toward the following goals.       Problem: Safety - Adult  Goal: Free from fall injury  Outcome: Progressing     Problem: Discharge Planning  Goal: Discharge to home or other facility with appropriate resources  Outcome: Progressing     Problem: Chronic Conditions and Co-morbidities  Goal: Patient's chronic conditions and co-morbidity symptoms are monitored and maintained or improved  Outcome: Progressing     Problem: Diabetes  Goal: Maintain electrolyte levels within acceptable range throughout shift  Outcome: Progressing     Problem: Diabetes  Goal: Maintain glucose levels >70mg/dl to <250mg/dl throughout shift  Outcome: Progressing     Problem: Diabetes  Goal: No changes in neurological exam by end of shift  Outcome: Progressing     Problem: Diabetes  Goal: Vital signs within normal range for age by end of shift  Outcome: Progressing     Problem: Pain  Goal: Takes deep breaths with improved pain control throughout the shift  Outcome: Progressing     Problem: Pain  Goal: Turns in bed with improved pain control throughout the shift  Outcome: Progressing     Problem: Pain  Goal: Free from acute confusion related to pain meds throughout the shift  Outcome: Progressing

## 2024-11-29 NOTE — NURSING NOTE
Dr. Stoddard called for an update on patient. She stated that she would be in shortly to see him and discharge him home.

## 2024-11-29 NOTE — NURSING NOTE
Assumed care of pt around this time.  Pt is alert, lying in bed, watching tv during BSSR.  Respirations even and unlabored on room air.  No IV fluids running.  Day shift nurse stated pt is supposed to be going home tomorrow.  Denies any needs at the moment.  Will be continuing to monitor.

## 2024-11-29 NOTE — NURSING NOTE
Assumed care of pt. Sleeping at this time, chest rise and fall observed, no s/sx of distress. Will return to complete assessment. Call light is in reach.

## 2024-11-29 NOTE — PROGRESS NOTES
Aaron Shaw is a 69 y.o. male on day 4 of admission presenting with Altered mental status, unspecified altered mental status type.      Subjective   BETTER REPEAT CT  CHEST AS OP HE IS AWARE       Objective     Last Recorded Vitals  /65 (BP Location: Right arm, Patient Position: Lying)   Pulse 68   Temp 36.4 °C (97.5 °F) (Oral)   Resp 17   Wt 78.1 kg (172 lb 2.9 oz)   SpO2 93%   Intake/Output last 3 Shifts:    Intake/Output Summary (Last 24 hours) at 11/29/2024 1028  Last data filed at 11/29/2024 0900  Gross per 24 hour   Intake 880 ml   Output 300 ml   Net 580 ml       Admission Weight  Weight: 72.6 kg (160 lb) (11/25/24 1330)    Daily Weight  11/28/24 : 78.1 kg (172 lb 2.9 oz)    Image Results  ECG 12 lead  Normal sinus rhythm  Early repolarization  Normal ECG  When compared with ECG of 11-APR-2024 13:20,  Vent. rate has decreased BY  43 BPM  Confirmed by Christian Tuttle ([id number]) on 11/28/2024 1:51:14 PM      Physical Exam    Relevant Results               Assessment/Plan                  Assessment & Plan  Altered mental status, unspecified altered mental status type    Abdominal pain    Hypomagnesemia    Nausea    Weakness    Type 2 diabetes mellitus with circulatory disorder, without long-term current use of insulin    Hypertension    Tobacco abuse    Pulmonary emphysema (Multi)    DO NOT SMOKE, F/U 1-2 WEEKS  [phone] DR Richi Stoddard MD

## 2024-11-29 NOTE — PROGRESS NOTES
11/29/24 0941   Discharge Planning   Home or Post Acute Services None   Expected Discharge Disposition Home  (Pt refused both HHC and SNF-pt will go home)     Safe dc plan secured home    Pt can go when medically cleared-refusing skilled care

## 2024-12-10 NOTE — DISCHARGE SUMMARY
Discharge Diagnosis  Altered mental status, unspecified altered mental status type    Issues Requiring Follow-Up  Confussion , colitis,alcohool abuse, low magnessium    Discharge Meds     Medication List      START taking these medications     doxycycline 100 mg capsule; Commonly known as: Vibramycin; Take 1   capsule (100 mg) by mouth every 12 hours. Take with at least 8 ounces   (large glass) of water, do not lie down for 30 minutes after   fluticasone furoate-vilanteroL 200-25 mcg/dose inhaler; Commonly known   as: Breo Ellipta; Inhale 1 puff once daily.   tiotropium 2.5 mcg/actuation inhaler; Commonly known as: Spiriva   Respimat; Inhale 2 puffs once daily.     CONTINUE taking these medications     ascorbic acid 500 mg tablet; Commonly known as: Vitamin C   atorvastatin 40 mg tablet; Commonly known as: Lipitor   fluticasone 50 mcg/actuation nasal spray; Commonly known as: Flonase   glipiZIDE 5 mg tablet; Commonly known as: Glucotrol   lisinopril 5 mg tablet   metFORMIN 1,000 mg tablet; Commonly known as: Glucophage   pantoprazole 20 mg EC tablet; Commonly known as: ProtoNix     STOP taking these medications     azithromycin 250 mg tablet; Commonly known as: Zithromax   magnesium lactate CR 84 mg ER tablet; Commonly known as: Magtab   pregabalin 300 mg capsule; Commonly known as: Lyrica   tadalafil 20 mg tablet; Commonly known as: Cialis   vitamin E 180 mg (400 unit) capsule     ASK your doctor about these medications     predniSONE 20 mg tablet; Commonly known as: Deltasone; Take 1 tablet (20   mg) by mouth once daily for 2 doses.; Ask about: Should I take this   medication?       Test Results Pending At Discharge  Pending Labs       No current pending labs.            Hospital Course   better    Pertinent Physical Exam At Time of Discharge  Physical Exam/improoved    Outpatient Follow-Up  No future appointments.      Neva Stoddard MD

## 2024-12-11 NOTE — DOCUMENTATION CLARIFICATION NOTE
"    PATIENT:               CLIFFORD CALLAHAN  ACCT #:                  [id number]  MRN:                       [id number]  :                       1955  ADMIT DATE:       2024 1:42 PM  DISCH DATE:        2024 11:38 AM  RESPONDING PROVIDER #:        [id number]          PROVIDER RESPONSE TEXT:    aspiration PNA/ COPD exacerbation, Acute respiratory failure  ruled in for this admission    CDI QUERY TEXT:    Clarification        Instruction:    Based on your assessment of the patient and the clinical information, please provide the requested documentation by clicking on the appropriate radio button and enter any additional information if prompted.    Question: Please further clarify the diagnosis of aspiration PNA/ COPD exacerbation, Acute respiratory failure  as    When answering this query, please exercise your independent professional judgment. The fact that a question is being asked, does not imply that any particular answer is desired or expected.    The patient's clinical indicators include:  Clinical Information: 69 y.o. male presenting with confusion, colitis, hypomagnesemia, hx alcohol abuse ''when young'', smoker now 1 ppd advised to quit.    Clinical Indicators:    : CT Chest: \"Impression:  1. Diffuse emphysematous changes demonstrated with subpleural  reticulation and scar in the right upper lung zone laterally.  Superimposed component of infiltrate in this areas not excluded  without dense airspace consolidation. There is a linear configuration  of the area of presumed infiltrate.    2. There is a 6 mm pulmonary nodule along the minor fissure with  follow-up CT chest recommended in 6 months to document stability.\"    -: Pulm consult: \"Acute hypoxic respiratory failure 1 L of oxygen due to severe emphysema, COPD exacerbation, aspiration versus bronchopneumonia picture  COPD//emphysema no PFTs on chart, no pulmonologist, on Spiriva, currently in exacerbation  Heavy smoker  Right upper " "lobe scarring most probably caused by pneumonia in April  Bronchopneumonia vs  aspiration from vomiting\"    Treatment: Pulm consult, Ceftriaxone 1 gm iv q 24 hrs, Doxycycline 100 mg p.o. q 12 hrs, Duo neb aerosol TID, Prednisone 20 mg p.o. daily    Risk Factors: COPD, GERD, Substance abuse, Tobacco use  Options provided:  -- aspiration PNA/ COPD exacerbation, Acute respiratory failure ruled out after workup  -- aspiration PNA/ COPD exacerbation, Acute respiratory failure  ruled in for this admission  -- Other - I will add my own diagnosis  -- Refer to Clinical Documentation Reviewer    Query created by: Geoff Cronin on 12/11/2024 9:35 AM      Electronically signed by:  JOHNY WESTFALL MD 12/11/2024 11:08 AM          "

## 2024-12-11 NOTE — DOCUMENTATION CLARIFICATION NOTE
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    Question: Please clarify if a relationship exists between    When answering this query, please exercise your independent professional judgment. The fact that a question is being asked, does not imply that any particular answer is desired or expected.    The patient's clinical indicators include:  Clinical Information: 69 y.o. male presenting with confusion, colitis, hypomagnesemia hx alcohol abuse ''when young'', smoker now 1 ppd advised to quit.    Clinical Indicators:    : CT head: negative    : Labs: mag 1.27, na 134    : Pulm consult: \"Acute hypoxic respiratory failure 1 L of oxygen due to severe emphysema, COPD exacerbation, aspiration versus bronchopneumonia picture.  COPD//emphysema no PFTs on chart, no pulmonologist, on Spiriva, currently in exacerbation  Heavy smoker  Right upper lobe scarring most probably caused by pneumonia in April  Bronchopneumonia vs  aspiration from vomiting\"    : Neuro: \"Suspect AMS 2/2 delirium in the setting of pain.\"    : GI: \"Altered Mental Status, Diarrhea, Abnormal CT  CT a/p showing gastric and sigmoid wall thickening. Possible this was some isolated infection. Symptoms have resolved. He has no abdominal pain at all. Need to r/o other causes of altered mentation per primary\"    : DS: \"Discharge Diagnosis: Altered mental status, " "unspecified altered mental status type  Issues Requiring Follow-Up  Confusion , colitis, alcohool abuse, low magnesium\"    Treatment: 2 gm mag iv x 2, Ceftriaxone 1 gm iv x 1, doxycycline 100 mg p.o. q 12 hrs, Neuro consult, pulm consult    Risk Factors: COPD, DM, GERD, HTN  Options provided:  -- altered mental status related to aspiration PNA  -- Altered mental status related to Bronchopneumonia  -- Altered mental status related to COPD exacerbation  -- Altered mental status related to hypomagnesemia  -- Altered mental status related to Colitis/ Abd pain  -- Other - I will add my own diagnosis  -- Refer to Clinical Documentation Reviewer    Query created by: Geoff Cronin on 12/10/2024 8:54 AM      Electronically signed by:  JOHNY WESTFALL MD 12/10/2024 8:13 PM          "

## 2024-12-17 ENCOUNTER — OFFICE VISIT (OUTPATIENT)
Dept: PULMONOLOGY | Facility: CLINIC | Age: 69
End: 2024-12-17
Payer: OTHER GOVERNMENT

## 2024-12-17 VITALS
OXYGEN SATURATION: 94 % | HEART RATE: 102 BPM | HEIGHT: 73 IN | SYSTOLIC BLOOD PRESSURE: 138 MMHG | WEIGHT: 175 LBS | DIASTOLIC BLOOD PRESSURE: 79 MMHG | BODY MASS INDEX: 23.19 KG/M2

## 2024-12-17 DIAGNOSIS — Z87.891 FORMER CIGARETTE SMOKER: ICD-10-CM

## 2024-12-17 DIAGNOSIS — R91.1 LUNG NODULE: ICD-10-CM

## 2024-12-17 DIAGNOSIS — J44.9 CHRONIC OBSTRUCTIVE PULMONARY DISEASE, UNSPECIFIED COPD TYPE (MULTI): ICD-10-CM

## 2024-12-17 DIAGNOSIS — R29.818 SUSPECTED SLEEP APNEA: ICD-10-CM

## 2024-12-17 DIAGNOSIS — R06.09 DOE (DYSPNEA ON EXERTION): Primary | ICD-10-CM

## 2024-12-17 DIAGNOSIS — R93.89 ABNORMAL CHEST CT: ICD-10-CM

## 2024-12-17 DIAGNOSIS — J96.11 CHRONIC RESPIRATORY FAILURE WITH HYPOXIA (MULTI): ICD-10-CM

## 2024-12-17 PROBLEM — M19.079 OSTEOARTHRITIS OF ANKLE OR FOOT: Status: ACTIVE | Noted: 2024-12-17

## 2024-12-17 PROBLEM — K05.329: Status: ACTIVE | Noted: 2024-12-17

## 2024-12-17 PROBLEM — B35.1 ONYCHOMYCOSIS OF TOENAIL: Status: ACTIVE | Noted: 2024-12-17

## 2024-12-17 PROBLEM — F10.11 HISTORY OF ALCOHOL ABUSE: Status: ACTIVE | Noted: 2024-12-17

## 2024-12-17 PROBLEM — F17.200 SMOKER: Status: ACTIVE | Noted: 2024-11-25

## 2024-12-17 PROBLEM — R91.8 MULTIPLE PULMONARY NODULES: Status: ACTIVE | Noted: 2024-12-17

## 2024-12-17 PROBLEM — E11.40 TYPE 2 DIABETES MELLITUS WITH DIABETIC NEUROPATHY, UNSPECIFIED (MULTI): Status: ACTIVE | Noted: 2024-12-17

## 2024-12-17 PROBLEM — R60.0 LOCALIZED EDEMA: Status: ACTIVE | Noted: 2024-12-17

## 2024-12-17 PROBLEM — M47.816 LUMBAR SPONDYLOSIS: Status: ACTIVE | Noted: 2024-12-17

## 2024-12-17 PROBLEM — J11.2 INFLUENZA WITH GASTROINTESTINAL TRACT INVOLVEMENT: Status: ACTIVE | Noted: 2024-12-17

## 2024-12-17 PROBLEM — E11.9 TYPE 2 DIABETES MELLITUS WITHOUT COMPLICATIONS (MULTI): Status: ACTIVE | Noted: 2024-11-25

## 2024-12-17 PROBLEM — L02.91 CUTANEOUS ABSCESS, UNSPECIFIED: Status: ACTIVE | Noted: 2024-12-17

## 2024-12-17 PROBLEM — R06.02 SHORTNESS OF BREATH: Status: ACTIVE | Noted: 2024-12-17

## 2024-12-17 PROBLEM — I25.10 CORONARY ARTERIOSCLEROSIS: Status: ACTIVE | Noted: 2024-12-17

## 2024-12-17 PROBLEM — F12.10 CANNABIS ABUSE: Status: ACTIVE | Noted: 2024-12-17

## 2024-12-17 PROBLEM — E78.5 HYPERLIPIDEMIA: Status: ACTIVE | Noted: 2024-12-17

## 2024-12-17 PROBLEM — K86.1 CHRONIC PANCREATITIS (MULTI): Status: ACTIVE | Noted: 2024-12-17

## 2024-12-17 PROBLEM — K04.7 PERIAPICAL ABSCESS WITHOUT SINUS: Status: ACTIVE | Noted: 2024-12-17

## 2024-12-17 PROBLEM — D04.9 SQUAMOUS CELL CARCINOMA IN SITU (SCCIS) OF SKIN: Status: ACTIVE | Noted: 2024-12-17

## 2024-12-17 PROBLEM — R05.9 COUGH: Status: ACTIVE | Noted: 2024-12-17

## 2024-12-17 PROBLEM — G83.10 PARESIS OF SINGLE LOWER EXTREMITY (MULTI): Status: ACTIVE | Noted: 2024-12-17

## 2024-12-17 PROCEDURE — 99214 OFFICE O/P EST MOD 30 MIN: CPT | Performed by: NURSE PRACTITIONER

## 2024-12-17 PROCEDURE — 3008F BODY MASS INDEX DOCD: CPT | Performed by: NURSE PRACTITIONER

## 2024-12-17 PROCEDURE — 3051F HG A1C>EQUAL 7.0%<8.0%: CPT | Performed by: NURSE PRACTITIONER

## 2024-12-17 PROCEDURE — 1159F MED LIST DOCD IN RCRD: CPT | Performed by: NURSE PRACTITIONER

## 2024-12-17 PROCEDURE — 4004F PT TOBACCO SCREEN RCVD TLK: CPT | Performed by: NURSE PRACTITIONER

## 2024-12-17 PROCEDURE — 3078F DIAST BP <80 MM HG: CPT | Performed by: NURSE PRACTITIONER

## 2024-12-17 PROCEDURE — 1125F AMNT PAIN NOTED PAIN PRSNT: CPT | Performed by: NURSE PRACTITIONER

## 2024-12-17 PROCEDURE — 4010F ACE/ARB THERAPY RXD/TAKEN: CPT | Performed by: NURSE PRACTITIONER

## 2024-12-17 PROCEDURE — 3075F SYST BP GE 130 - 139MM HG: CPT | Performed by: NURSE PRACTITIONER

## 2024-12-17 PROCEDURE — 1160F RVW MEDS BY RX/DR IN RCRD: CPT | Performed by: NURSE PRACTITIONER

## 2024-12-17 PROCEDURE — 1111F DSCHRG MED/CURRENT MED MERGE: CPT | Performed by: NURSE PRACTITIONER

## 2024-12-17 RX ORDER — IPRATROPIUM BROMIDE AND ALBUTEROL SULFATE 2.5; .5 MG/3ML; MG/3ML
3 SOLUTION RESPIRATORY (INHALATION) EVERY 6 HOURS PRN
Qty: 360 ML | Refills: 5 | Status: SHIPPED | OUTPATIENT
Start: 2024-12-17

## 2024-12-17 RX ORDER — LEVALBUTEROL TARTRATE 45 UG/1
2 AEROSOL, METERED ORAL EVERY 4 HOURS PRN
COMMUNITY

## 2024-12-17 RX ORDER — ALBUTEROL SULFATE 90 UG/1
1 INHALANT RESPIRATORY (INHALATION) ONCE
OUTPATIENT
Start: 2024-12-17

## 2024-12-17 RX ORDER — ALBUTEROL SULFATE 0.83 MG/ML
3 SOLUTION RESPIRATORY (INHALATION) ONCE
OUTPATIENT
Start: 2024-12-17 | End: 2024-12-17

## 2024-12-17 ASSESSMENT — ENCOUNTER SYMPTOMS
ACTIVITY CHANGE: 1
RHINORRHEA: 1
DIARRHEA: 0
DIZZINESS: 0
CHILLS: 0
SHORTNESS OF BREATH: 1
PALPITATIONS: 0
WEAKNESS: 1
NERVOUS/ANXIOUS: 0
TROUBLE SWALLOWING: 0
AGITATION: 0
WHEEZING: 1
CHEST TIGHTNESS: 0
ROS GI COMMENTS: DENIES ACID REFLUX
SLEEP DISTURBANCE: 1
SINUS PAIN: 0
BRUISES/BLEEDS EASILY: 0
EYE ITCHING: 0
VOMITING: 0
SORE THROAT: 0
HEADACHES: 0
ABDOMINAL PAIN: 0
VOICE CHANGE: 0
BACK PAIN: 0
MYALGIAS: 0
TREMORS: 0
FATIGUE: 1
NAUSEA: 1
FEVER: 0
EYE REDNESS: 0
ARTHRALGIAS: 1
APPETITE CHANGE: 1
STRIDOR: 0
UNEXPECTED WEIGHT CHANGE: 0
JOINT SWELLING: 0
COUGH: 1
SINUS PRESSURE: 0

## 2024-12-17 ASSESSMENT — PATIENT HEALTH QUESTIONNAIRE - PHQ9
SUM OF ALL RESPONSES TO PHQ9 QUESTIONS 1 & 2: 0
1. LITTLE INTEREST OR PLEASURE IN DOING THINGS: NOT AT ALL
2. FEELING DOWN, DEPRESSED OR HOPELESS: NOT AT ALL

## 2024-12-17 ASSESSMENT — LIFESTYLE VARIABLES: HOW MANY STANDARD DRINKS CONTAINING ALCOHOL DO YOU HAVE ON A TYPICAL DAY: PATIENT DOES NOT DRINK

## 2024-12-17 ASSESSMENT — PAIN SCALES - GENERAL: PAINLEVEL_OUTOF10: 6

## 2024-12-17 NOTE — PATIENT INSTRUCTIONS
"Today we discussed your pulmonary history, symptoms and plan moving forward.    -Ordering pulmonary function testing. (Please stop using your daily maintenance inhaler(s) for 5 days prior to testing date. You may use your as needed albuterol inhaler/nebulizer during this time frame if needed. Please resume your daily maintenance inhaler(s) after completion of testing).  -Stop Breztri inhaler  -Resume Breo 200; 1 inhalation once a day.  Rinse mouth after use.  This is your long-acting daily maintenance inhaler  -Resume Spiriva Respimat; 2 inhalations once a day.  This is your long-acting daily maintenance inhaler  -Continue Albuterol Inhaler; 2 puffs every 4-6 hours as needed for shortness of breath. You can also take this 10-15 minutes prior to exertional activity to help \"prime\" your lungs.  -Start DuoNeb (albuterol/ipratropium) nebulizer treatments; 1 unit dose every 6 hours as needed for shortness of breath/cough. Can also use these treatments daily/scheduled if you find them helpful.  -Rx for home nebulizer device  -Ordering repeat chest CT due mid January as a follow-up from your pneumonia hospitalization  -Ordering echocardiogram  -Please monitor your oxygen levels at home very closely; goal is to be above 88 to 90%.  Continue wearing your supplemental oxygen 24/7  -Referral to sleep medicine    Thank you for visiting the pulmonary clinic today! It was a pleasure to participate in your care.  Please return to clinic after testing completion (can be virtual) or sooner if needed.    Teo Nelson, CNP  My Office Number: [phone]   CT Scheduling: [phone]  PFT/Follow Up Visit Scheduling: [phone]  My Nurse: MARIELA Owens    To reach the nurse, Graciela Scherer RN, please call ([phone]) Graciela has a secure voice mail account if you want to leave a message.    **For immediate needs such as medication issues/refills, active sick symptoms/medical concerns; I ask that you please call the office and " speak to the pulmonary nurse. MyChart messages do not come directly to me. There can sometimes be a delay before I am aware of any messages that were sent. Thank you.**

## 2024-12-17 NOTE — PROGRESS NOTES
Patient: Araon Shaw    [id number]  : 1955 -- AGE 69 y.o.    Provider: CLAUDIA Stinson     Location [facility]   Service Date: 2024       Department of Medicine  Division of Pulmonary, Critical Care, and Sleep Medicine       [facility]  New Visit Note    HISTORY OF PRESENT ILLNESS     PCP: None     HISTORY OF PRESENT ILLNESS   Aaron Shaw is a 69 y.o. male who presents to a [facility] for an evaluation with concerns of Consult and COPD.   I have independently interviewed and examined the patient in the office and reviewed available records.    Current History  Patient presents to pulmonary clinic today as a new patient; recently admitted 24 - 24 for altered mental status. During admission, pulmonology was consulted.  There is a reported history of COPD; no PFT on record to confirm this diagnosis.  There was concern for bronchopneumonia versus aspiration from vomiting.  Patient was treated with prednisone as well as antibiotics.  Prior to discharge, he qualified for home oxygen requiring 2 L of supplemental O2 both at rest as well as on exertion..  Additionally, he had an admission earlier this year in April for pneumonia and hypoxic respiratory failure.  Most recent CT on record from 2024 showing diffuse emphysematous changes with mild subpleural reticulation and scarring in the right upper lung.  There was concern for superimposed component of infiltrate along with a 6 mm pulmonary nodule along the minor fissure. He is a former cigarette smoker; averaging 2 ppd from age [age]; 112 total pack years. Quit 24.    On today's visit, the patient reports he was told he had COPD many years ago. Does not think he has ever done PFT testing before. Does report both SOB at rest as well as CASTILLO; very limited in his activity due to not being able to breathe. Saw a change in his breathing  a number of years ago. Takes Breztri currently; this was started for him when he got out of the hospital. Was previously on Breo 200 and Spiriva; felt this worked better for him. Had been on this regimen for a few years. Uses Xopenex 1-2 x a day. He has a nebulizer at home; but has not used any treatments in years? Intermittent cough; not overly productive. Denies fever, sweats, chills. Weight has been stable. Does report wheezing. Intermittent orthopnea. Intermittent lower leg swelling. Denies CP, palps. Does not know if he's ever seen cardiology. Intermittent GERD; thinks he takes pantoprazole? Chronic rhinitis. Denies dysphagia symptoms.     Denies premature birth. No childhood pulmonary issues growing up. Admitted multiple times for pneumonia. Just recently started on oxygen after most recent admission; notes state 2L at all times. Has been wearing a pulse of 3-4.    Has never completed a sleep study before. No known snoring. States he sleeps poor. Can sleep a lot during the day.  Not waking up choking. Lots of daytime fatigue.     CAT Today: 32  ACT Today: 10  ESS Today: 14    Prior History   Admitted 11/26/24 - 11/29/24 for altered mental status   Pulmonary Progress Note (Dr. Katelyn Montes) 11/28/24   Aaron Shaw is a 69 y.o. year old male patient known with 69 y.o. male with PMHX significant for  COPD/ emphysema (no PFT on chart, on spiriva, no Pulm OP doctor), chronic pancreatitis, DJD, DM2, diabetic neuropathy, HLD, HTN, SSC of skiin, tobacco abuse, alcohol abuse, illicit drug abuse, pulmonary nodule, DJD, and low back pain presented to the emergency department for mental status change and abdominal pain.  admitted on 11/25/2024 with following abdominal pain and AMS. Pulmonary consulted for emphysema, lung congestion and lung nodule        Interval History:   Patient mentions improvement since yesterday/remains to have a cough but denies shortness of breath or chest pain.  Oxygen was provided for him  according to RT      Impression   Aaron Shaw is a 69 y.o. year old male patient known with 69 y.o. male with PMHX significant for  COPD/ emphysema (no PFT on chart, on spiriva, no Pulm OP doctor), chronic pancreatitis, DJD, DM2, diabetic neuropathy, HLD, HTN, SSC of skiin, tobacco abuse, alcohol abuse, illicit drug abuse, pulmonary nodule, DJD, and low back pain presented to the emergency department for mental status change and abdominal pain.  admitted on 11/25/2024 with following abdominal pain and AMS. Pulmonary consulted for emphysema, lung congestion and lung nodule    Acute hypoxic respiratory failure 2 L of oxygen due to severe emphysema, COPD exacerbation, aspiration versus bronchopneumonia picture   COPD//emphysema no PFTs on chart, no pulmonologist, on Spiriva, currently in exacerbation   Heavy smoker   Right upper lobe scarring most probably caused by pneumonia in April   Bronchopneumonia vs  aspiration from vomiting   Fissure lung nodule       Recommendations   As follows:   AECOPD management with prednisone for 5 days (prescribed lower dose because of gastritis), scheduled aerosol treatment, and doxycycline for 5 days.   sputum culture pending however discontinued ceftriaxone as procalcitonin is negative and doxycycline should be sufficient by itself,    Patient qualified for home oxygen, 2 L of oxygen on rest and exertion yesterday. Patient will need close follow up to assess his oxygen needs. Patient is agreeable to follow and is aware to quit smoking and not to smoke next to oxygen    bronchopulmonary hygiene with Incentive spirometry and Acapella    Smoking cessation   Could consider SLP evaluation   Patient will need outpatient pulmonary follow-up for PFTs and COPD management   Once ready for discharge patient can be sent on LAMA/LABA combination , recommend stiolto 2 puffs daily instead of spiriva if covered by insurance, with albuterol HFA (for outside the house) and nebulizer (for home  therapy) PRN every 4-6 hours   Patient will need repeat imaging in 6 to 8 weeks to ensure resolution of infiltrates and follow-up on right upper lobe scarring and follow up on the fissural lung nodule.         Katelyn Montes MD      Admitted 4/11/24 - 4/16/24 for pneumonia and hypoxic respiratory failure   Hospital Course   Aaron Shaw is a 68 y.o. male presenting with shortness of breath.  Patient was seen in his primary care physician's office and sent to the emergency room.  He reported 2-week history of cough productive of tan sputum.  He also reports ongoing nicotine use.  He has been wheezing.  He reports compliance with his inhaler therapy.  He also reports chronic low back pain which has been worse recently.  He has a history of sciatica.  He was evaluated in the ER and found to have evidence of fluid overload as well as COPD exacerbation.  He received steroids, aerosol treatments, diuretics, and antibiotics with improvement.  He was admitted to stepdown unit.       The patient was treated for a COPD exacerbation, was seen by pulm, s/s improved. He was stable for dc, recommended close outpatient follow up.       REVIEW OF SYSTEMS     REVIEW OF SYSTEMS  Review of Systems   Constitutional:  Positive for activity change, appetite change and fatigue. Negative for chills, fever and unexpected weight change.   HENT:  Positive for congestion and rhinorrhea. Negative for postnasal drip, sinus pressure, sinus pain, sneezing, sore throat, trouble swallowing and voice change.         Denies throat clearing   Eyes:  Negative for redness and itching.   Respiratory:  Positive for cough, shortness of breath and wheezing. Negative for chest tightness and stridor.    Cardiovascular:  Positive for leg swelling. Negative for chest pain and palpitations.        + orthopnea   Gastrointestinal:  Positive for nausea. Negative for abdominal pain, diarrhea and vomiting.        Denies acid reflux   Musculoskeletal:  Positive for  arthralgias. Negative for back pain, joint swelling and myalgias.   Skin:  Negative for rash.   Allergic/Immunologic: Negative for immunocompromised state.   Neurological:  Positive for weakness. Negative for dizziness, tremors and headaches.   Hematological:  Does not bruise/bleed easily.   Psychiatric/Behavioral:  Positive for sleep disturbance. Negative for agitation. The patient is not nervous/anxious.         Denies depression   All other systems reviewed and are negative.      ALLERGIES AND MEDICATIONS     ALLERGIES  No Known Allergies    MEDICATIONS  Current Outpatient Medications   Medication Sig Dispense Refill    ascorbic acid (Vitamin C) 500 mg tablet Take 1 tablet (500 mg) by mouth once daily.      atorvastatin (Lipitor) 40 mg tablet Take 1 tablet (40 mg) by mouth once daily.      fluticasone (Flonase) 50 mcg/actuation nasal spray Administer 1 spray into each nostril once daily. Shake gently. Before first use, prime pump. After use, clean tip and replace cap.      glipiZIDE (Glucotrol) 5 mg tablet Take 1 tablet (5 mg) by mouth 2 times a day before meals.      levalbuterol (Xopenex) 45 mcg/actuation inhaler Inhale 2 puffs every 4 hours if needed for wheezing or shortness of breath.      lisinopril 5 mg tablet Take 1 tablet (5 mg) by mouth once daily.      metFORMIN (Glucophage) 1,000 mg tablet Take 1 tablet (1,000 mg) by mouth 2 times daily (morning and late afternoon).      pantoprazole (ProtoNix) 20 mg EC tablet Take 1 tablet (20 mg) by mouth once daily in the morning. Take before meals. Do not crush, chew, or split.      doxycycline (Vibramycin) 100 mg capsule Take 1 capsule (100 mg) by mouth every 12 hours. Take with at least 8 ounces (large glass) of water, do not lie down for 30 minutes after (Patient not taking: Reported on 12/17/2024) 14 capsule 0    fluticasone furoate-vilanteroL (Breo Ellipta) 200-25 mcg/dose inhaler Inhale 1 puff once daily. (Patient not taking: Reported on 12/17/2024) 60 each  0    ipratropium-albuteroL (Duo-Neb) 0.5-2.5 mg/3 mL nebulizer solution Take 3 mL by nebulization every 6 hours if needed for shortness of breath. 360 mL 5    tiotropium (Spiriva Respimat) 2.5 mcg/actuation inhaler Inhale 2 puffs once daily. (Patient not taking: Reported on 12/17/2024) 8 g 11     No current facility-administered medications for this visit.       PAST HISTORY     PAST MEDICAL HISTORY  He  has a past medical history of COPD (chronic obstructive pulmonary disease) (Multi), Diabetes mellitus (Multi), GERD (gastroesophageal reflux disease), Hypertension, Jaundice, and Substance abuse (Multi).    PAST SURGICAL HISTORY  No past surgical history on file.    IMMUNIZATION HISTORY  Immunization History   Administered Date(s) Administered    Flu vaccine (IIV4), preservative free *Check age/dose* 03/13/2018    Flu vaccine, quadrivalent, high-dose, preservative free, age 65y+ (FLUZONE) 09/29/2020, 02/27/2024    Flu vaccine, trivalent, preservative free, age 6 months and greater (Fluarix/Fluzone/Flulaval) 11/02/2015, 09/30/2016    Influenza, Seasonal, Quadrivalent, Adjuvanted 09/28/2022    Influenza, Unspecified 10/10/2014    Influenza, trivalent, adjuvanted 11/29/2018, 10/24/2019    Pfizer COVID-19 vaccine, 12 years and older, (30mcg/0.3mL) (Comirnaty) 02/27/2024    Pfizer Gray Cap SARS-CoV-2 01/11/2022    Pfizer Purple Cap SARS-CoV-2 03/24/2021, 04/13/2021    Pneumococcal conjugate vaccine, 20-valent (PREVNAR 20) 04/11/2022    Pneumococcal polysaccharide vaccine, 23-valent, age 2 years and older (PNEUMOVAX 23) 02/21/2020    Pneumococcal, Unspecified 05/01/2014    Tdap vaccine, age 7 year and older (BOOSTRIX, ADACEL) 11/02/2015    Zoster vaccine, recombinant, adult (SHINGRIX) 06/25/2021, 03/14/2022    Zoster, live 09/30/2016       SOCIAL HISTORY  He  reports that he quit smoking about 3 weeks ago. His smoking use included cigarettes. He started smoking about 55 years ago. He has a 111.8 pack-year smoking history.  "He has been exposed to tobacco smoke. He does not have any smokeless tobacco history on file. He reports that he does not currently use alcohol. He reports current drug use. Drug: Marijuana.     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    FAMILY HISTORY  No family history on file.    PHYSICAL EXAM     VITAL SIGNS: /79   Pulse 102   Ht 1.854 m (6' 1\")   Wt 79.4 kg (175 lb)   SpO2 94%   PF (!) 4 L/min   BMI 23.09 kg/m²      PREVIOUS WEIGHTS:  Wt Readings from Last 3 Encounters:   12/17/24 79.4 kg (175 lb)   11/28/24 78.1 kg (172 lb 2.9 oz)   04/16/24 82 kg (180 lb 12.4 oz)       Physical Exam  Vitals reviewed.   Constitutional:       General: He is not in acute distress.     Appearance: Normal appearance. He is not ill-appearing or toxic-appearing.   HENT:      Head: Normocephalic.      Nose: No rhinorrhea.   Cardiovascular:      Rate and Rhythm: Normal rate and regular rhythm.      Heart sounds: Normal heart sounds.   Pulmonary:      Effort: Pulmonary effort is normal. No respiratory distress.      Breath sounds: No stridor. Rhonchi present. No wheezing or rales.      Comments: Mildly diminished lung sounds all over with scattered faint rhonchi  Abdominal:      General: Abdomen is flat.   Musculoskeletal:         General: Normal range of motion.      Right lower leg: No edema.      Left lower leg: No edema.   Skin:     General: Skin is warm and dry.      Nails: There is no clubbing.   Neurological:      General: No focal deficit present.      Mental Status: He is alert and oriented to person, place, and time.   Psychiatric:         Mood and Affect: Mood normal.         Behavior: Behavior normal.         Judgment: Judgment normal.         RESULTS/DATA     Pulmonary Function Test Results     No PFT on " "record    Chest Radiograph   CXR   11/25/24: IMPRESSION: An area of subpleural nodularity in the right upper lobe is similar to the prior exam and may represent scarring. Short-term follow-up or CT of the chest as clinically warranted.     Chest CT Scan   Chest CT   11/26/24: IMPRESSION: 1. Diffuse emphysematous changes demonstrated with subpleural reticulation and scar in the right upper lung zone laterally. Superimposed component of infiltrate in this areas not excluded without dense airspace consolidation. There is a linear configuration of the area of presumed infiltrate. 2. There is a 6 mm pulmonary nodule along the minor fissure with follow-up CT chest recommended in 6 months to document stability. 3. Adrenal nodularity with underlying adenoma      CTA Chest   7/21/20:  Impression: No pulmonary embolism, aortic aneurysm or dissection. Extensive emphysema and few mildly distended mucous field bronchi in the right lower lobe. Stable hypodense nodules in both adrenal glands, left larger than right.     Echocardiogram & Cardiac Studies     No results found for this or any previous visit from the past 365 days.       Labwork & Pathology   Complete Blood Count  Lab Results   Component Value Date    WBC 6.2 11/28/2024    HGB 14.1 11/28/2024    HCT 40.7 (L) 11/28/2024    MCV 92 11/28/2024     11/28/2024     Peripheral Eosinophil Count:   Eosinophils Absolute (x10*3/uL)   Date Value   11/26/2024 0.04   11/25/2024 0.00   04/15/2024 0.00     Immunocap IgE  No results found for: \"ICIGE\"    Pro-BNP  Lab Results   Component Value Date    PROBNP 2,822 (H) 04/11/2024       Bronchoscopy & Pathology/Cultures       ASSESSMENT/PLAN     Mr. Shaw is a 69 y.o. male; was referred to the [facility] for evaluation of Consult and COPD    Problem List and Orders  Diagnoses and all orders for this visit:  CASTILLO (dyspnea on exertion)  -     Complete Pulmonary Function Test Pre/Post Bronchodilator " (Spirometry Pre/Post/DLCO/Lung Volumes); Future  -     Exhaled Nitric Oxide (FeNO); Future  -     Pulmonary Stress Test (6 Min. Walk); Future  -     Transthoracic Echo Complete; Future  Chronic obstructive pulmonary disease, unspecified COPD type (Multi)  -     ipratropium-albuteroL (Duo-Neb) 0.5-2.5 mg/3 mL nebulizer solution; Take 3 mL by nebulization every 6 hours if needed for shortness of breath.  -     Home Nebulizer  Chronic respiratory failure with hypoxia (Multi)  -     Pulmonary Stress Test (6 Min. Walk); Future  Abnormal chest CT  -     CT chest wo IV contrast; Future  Lung nodule  -     CT chest wo IV contrast; Future  Former cigarette smoker  Suspected sleep apnea  -     Referral to Adult Sleep Medicine; Future  Other orders  -     albuterol 2.5 mg /3 mL (0.083 %) nebulizer solution 3 mL  -     albuterol 90 mcg/actuation inhaler 1 puff      Assessment and Plan / Recommendations:  CASTILLO: reported hx of COPD; no PFT on record to confirm this. Symptoms began a number of years ago. Has been on inhaler therapy for many years.  -Ordering full PFT, FeNO to establish lung function baseline  -Ordering echocardiogram (BNP from 4/2024 was >2800)  -Stop Breztri (he doesn't like it)  -Resume Breo 200  -Resume Spiriva Respimat  -Continue Xopenex HFA PRN  -Start DuoNebs PRN  -Rx for home nebulizer device  -Will refer to pulmonary rehab once I have PFT testing    2. Respiratory Failure/Hypoxia: recently admitted 11/26/24 - 11/29/24; prior to discharge, he qualified for home oxygen requiring 2 L of supplemental O2 both at rest as well as on exertion.  -Ordering updated 6MWT to assess new oxygen needs  -Continue wearing supplemental O2 in order to maintain >88-90%  -Wear oxygen 24/7    3. Abnormal Chest CT/Lung Nodule: recently admitted 11/26/24 - 11/29/24; there was concern for bronchopneumonia versus aspiration from vomiting.  Patient was treated with prednisone as well as antibiotics. Most recent CT on record from  11/26/2024 showing diffuse emphysematous changes with mild subpleural reticulation and scarring in the right upper lung.  There was concern for superimposed component of infiltrate along with a 6 mm pulmonary nodule along the minor fissure.  -Ordering repeat Chest CT as 7 week follow up; due 1/14/25    4. Former Cigarette Smoker: He is a former cigarette smoker; averaging 2 ppd from age [age]; 112 total pack years. Quit 11/26/24.  -Can get him enrolled in [facility] protocol once it has been 1 year from last diagnostic scan    5. Suspected BERKLEY  -Referral to sleep medicine  -ESS today: 14    RTC after testing completion (can be virtual)    Teo Nelson CNP  Associate Pulmonary Nurse Practioner    *This note was dictated using DRAGON speech recognition software and was corrected for spelling or grammatical errors, but despite proofreading several typographical errors might be present that might affect the meaning of the content.*

## 2025-02-07 ENCOUNTER — APPOINTMENT (OUTPATIENT)
Dept: SLEEP MEDICINE | Facility: CLINIC | Age: 70
End: 2025-02-07
Payer: OTHER GOVERNMENT